# Patient Record
Sex: MALE | Race: WHITE | NOT HISPANIC OR LATINO | Employment: FULL TIME | URBAN - METROPOLITAN AREA
[De-identification: names, ages, dates, MRNs, and addresses within clinical notes are randomized per-mention and may not be internally consistent; named-entity substitution may affect disease eponyms.]

---

## 2017-01-03 RX ORDER — AMLODIPINE BESYLATE 10 MG/1
TABLET ORAL
Qty: 90 TABLET | Refills: 0 | Status: SHIPPED | OUTPATIENT
Start: 2017-01-03 | End: 2017-04-01 | Stop reason: SDUPTHER

## 2017-01-03 RX ORDER — CITALOPRAM 20 MG/1
TABLET, FILM COATED ORAL
Qty: 90 TABLET | Refills: 0 | Status: SHIPPED | OUTPATIENT
Start: 2017-01-03 | End: 2017-04-01 | Stop reason: SDUPTHER

## 2017-01-03 NOTE — LETTER
"January 3, 2017    Loydivonne Bateman  [address]  [place] [address]             [facility]  [address] [place] [address]  Phone: [phone]  Fax: [phone] Dear Mr. Bateman:    You have recently requested a refill from Dr. Nigel Naranjo.  He has supplied you with a 30 day of this medication.   It has been over a year since your last visit.  This is a reminder to schedule your annual with Dr. Nigel Naranjo.  Please call the [facility] scheduling department at [phone] before your next refill.  No further refills will be given.    If you have any questions or concerns, please don't hesitate to call.          Sincerely,        CHAITANYA Lowry Dr. (Casey)     "

## 2017-04-01 RX ORDER — CITALOPRAM 20 MG/1
TABLET, FILM COATED ORAL
Qty: 90 TABLET | Refills: 0 | Status: SHIPPED | OUTPATIENT
Start: 2017-04-01 | End: 2017-06-28 | Stop reason: SDUPTHER

## 2017-04-01 RX ORDER — AMLODIPINE BESYLATE 10 MG/1
TABLET ORAL
Qty: 90 TABLET | Refills: 0 | Status: SHIPPED | OUTPATIENT
Start: 2017-04-01 | End: 2017-06-28 | Stop reason: SDUPTHER

## 2017-05-25 RX ORDER — LOSARTAN POTASSIUM AND HYDROCHLOROTHIAZIDE 25; 100 MG/1; MG/1
TABLET ORAL
Qty: 90 TABLET | Refills: 0 | Status: SHIPPED | OUTPATIENT
Start: 2017-05-25 | End: 2017-06-28 | Stop reason: SDUPTHER

## 2017-06-13 RX ORDER — FINASTERIDE 1 MG/1
1 TABLET, FILM COATED ORAL DAILY
Qty: 30 TABLET | Refills: 0 | Status: SHIPPED | OUTPATIENT
Start: 2017-06-13 | End: 2017-06-28 | Stop reason: SDUPTHER

## 2017-06-16 ENCOUNTER — PATIENT OUTREACH (OUTPATIENT)
Dept: ADMINISTRATIVE | Facility: HOSPITAL | Age: 47
End: 2017-06-16

## 2017-06-16 ENCOUNTER — PATIENT MESSAGE (OUTPATIENT)
Dept: ADMINISTRATIVE | Facility: HOSPITAL | Age: 47
End: 2017-06-16

## 2017-06-16 DIAGNOSIS — E78.5 HYPERLIPIDEMIA, UNSPECIFIED HYPERLIPIDEMIA TYPE: ICD-10-CM

## 2017-06-16 DIAGNOSIS — I10 ESSENTIAL HYPERTENSION: Primary | ICD-10-CM

## 2017-06-18 NOTE — LETTER
"June 19, 2017    Loydivonne Bateman  [address]  [place] [address]             [facility]  [address] [place] [address]  Phone: [phone]  Fax: [phone] Dear Mr. Bateman:    You have recently requested a refill from Dr. Nigel Naranjo.  He has supplied you with a 30 day of this medication.   It has been over a year since your last visit.  This is a reminder to schedule your annual with Dr. Nigel Naranjo.  Please call the [facility] scheduling department at [phone] before your next refill.  No further refills will be given.    If you have any questions or concerns, please don't hesitate to call.          Sincerely,        CHAITANYA Lowry Dr. ([place])     "

## 2017-06-19 RX ORDER — ATORVASTATIN CALCIUM 20 MG/1
TABLET, FILM COATED ORAL
Qty: 90 TABLET | Refills: 0 | Status: SHIPPED | OUTPATIENT
Start: 2017-06-19 | End: 2017-06-28 | Stop reason: SDUPTHER

## 2017-06-23 ENCOUNTER — LAB VISIT (OUTPATIENT)
Dept: LAB | Facility: HOSPITAL | Age: 47
End: 2017-06-23
Attending: INTERNAL MEDICINE
Payer: OTHER GOVERNMENT

## 2017-06-23 DIAGNOSIS — E78.5 HYPERLIPIDEMIA, UNSPECIFIED HYPERLIPIDEMIA TYPE: ICD-10-CM

## 2017-06-23 DIAGNOSIS — I10 ESSENTIAL HYPERTENSION: ICD-10-CM

## 2017-06-23 LAB
ALBUMIN SERPL BCP-MCNC: 4.1 G/DL
ALP SERPL-CCNC: 80 U/L
ALT SERPL W/O P-5'-P-CCNC: 28 U/L
ANION GAP SERPL CALC-SCNC: 11 MMOL/L
AST SERPL-CCNC: 19 U/L
BASOPHILS # BLD AUTO: 0.02 K/UL
BASOPHILS NFR BLD: 0.4 %
BILIRUB SERPL-MCNC: 0.7 MG/DL
BUN SERPL-MCNC: 17 MG/DL
CALCIUM SERPL-MCNC: 9.7 MG/DL
CHLORIDE SERPL-SCNC: 100 MMOL/L
CHOLEST/HDLC SERPL: 4.2 {RATIO}
CO2 SERPL-SCNC: 30 MMOL/L
CREAT SERPL-MCNC: 1 MG/DL
DIFFERENTIAL METHOD: NORMAL
EOSINOPHIL # BLD AUTO: 0.1 K/UL
EOSINOPHIL NFR BLD: 1.7 %
ERYTHROCYTE [DISTWIDTH] IN BLOOD BY AUTOMATED COUNT: 13.3 %
EST. GFR  (AFRICAN AMERICAN): >60 ML/MIN/1.73 M^2
EST. GFR  (NON AFRICAN AMERICAN): >60 ML/MIN/1.73 M^2
GLUCOSE SERPL-MCNC: 63 MG/DL
HCT VFR BLD AUTO: 42.9 %
HDL/CHOLESTEROL RATIO: 24 %
HDLC SERPL-MCNC: 175 MG/DL
HDLC SERPL-MCNC: 42 MG/DL
HGB BLD-MCNC: 14.4 G/DL
LDLC SERPL CALC-MCNC: 87.4 MG/DL
LYMPHOCYTES # BLD AUTO: 1.5 K/UL
LYMPHOCYTES NFR BLD: 31.7 %
MCH RBC QN AUTO: 29.6 PG
MCHC RBC AUTO-ENTMCNC: 33.6 %
MCV RBC AUTO: 88 FL
MONOCYTES # BLD AUTO: 0.5 K/UL
MONOCYTES NFR BLD: 11.8 %
NEUTROPHILS # BLD AUTO: 2.5 K/UL
NEUTROPHILS NFR BLD: 54 %
NONHDLC SERPL-MCNC: 133 MG/DL
PLATELET # BLD AUTO: 233 K/UL
PMV BLD AUTO: 10 FL
POTASSIUM SERPL-SCNC: 4.8 MMOL/L
PROT SERPL-MCNC: 7.3 G/DL
RBC # BLD AUTO: 4.86 M/UL
SODIUM SERPL-SCNC: 141 MMOL/L
TRIGL SERPL-MCNC: 228 MG/DL
WBC # BLD AUTO: 4.58 K/UL

## 2017-06-23 PROCEDURE — 80061 LIPID PANEL: CPT

## 2017-06-23 PROCEDURE — 80053 COMPREHEN METABOLIC PANEL: CPT

## 2017-06-23 PROCEDURE — 85025 COMPLETE CBC W/AUTO DIFF WBC: CPT

## 2017-06-23 PROCEDURE — 36415 COLL VENOUS BLD VENIPUNCTURE: CPT | Mod: PO

## 2017-06-26 NOTE — PROGRESS NOTES
Here are the results of your recent labs.  We will review them in detail at your follow up visit.    Sincerely,    Nigel Naranjo M.D.        If you would like to review your experience with Dr. Naranjo or [facility], please follow the link below:    [email or web address]

## 2017-06-28 ENCOUNTER — OFFICE VISIT (OUTPATIENT)
Dept: INTERNAL MEDICINE | Facility: CLINIC | Age: 47
End: 2017-06-28
Payer: OTHER GOVERNMENT

## 2017-06-28 VITALS
BODY MASS INDEX: 34.04 KG/M2 | HEIGHT: 72 IN | WEIGHT: 251.31 LBS | SYSTOLIC BLOOD PRESSURE: 122 MMHG | HEART RATE: 76 BPM | TEMPERATURE: 98 F | DIASTOLIC BLOOD PRESSURE: 80 MMHG

## 2017-06-28 DIAGNOSIS — Z00.00 ROUTINE PHYSICAL EXAMINATION: Primary | ICD-10-CM

## 2017-06-28 PROCEDURE — 99213 OFFICE O/P EST LOW 20 MIN: CPT | Mod: PBBFAC,PO | Performed by: FAMILY MEDICINE

## 2017-06-28 PROCEDURE — 99396 PREV VISIT EST AGE 40-64: CPT | Mod: S$PBB,,, | Performed by: FAMILY MEDICINE

## 2017-06-28 PROCEDURE — 99999 PR PBB SHADOW E&M-EST. PATIENT-LVL III: CPT | Mod: PBBFAC,,, | Performed by: FAMILY MEDICINE

## 2017-06-28 RX ORDER — AMLODIPINE BESYLATE 10 MG/1
10 TABLET ORAL DAILY
Qty: 90 TABLET | Refills: 3 | Status: SHIPPED | OUTPATIENT
Start: 2017-06-28 | End: 2018-09-06 | Stop reason: SDUPTHER

## 2017-06-28 RX ORDER — CITALOPRAM 20 MG/1
20 TABLET, FILM COATED ORAL DAILY
Qty: 90 TABLET | Refills: 3 | Status: SHIPPED | OUTPATIENT
Start: 2017-06-28 | End: 2017-08-01 | Stop reason: SDUPTHER

## 2017-06-28 RX ORDER — ATORVASTATIN CALCIUM 20 MG/1
20 TABLET, FILM COATED ORAL DAILY
Qty: 90 TABLET | Refills: 3 | Status: SHIPPED | OUTPATIENT
Start: 2017-06-28 | End: 2018-08-14 | Stop reason: SDUPTHER

## 2017-06-28 RX ORDER — OMEPRAZOLE 20 MG/1
20 CAPSULE, DELAYED RELEASE ORAL DAILY
Qty: 90 CAPSULE | Refills: 3 | Status: SHIPPED | OUTPATIENT
Start: 2017-06-28 | End: 2018-09-06 | Stop reason: SDUPTHER

## 2017-06-28 RX ORDER — LOSARTAN POTASSIUM AND HYDROCHLOROTHIAZIDE 25; 100 MG/1; MG/1
1 TABLET ORAL DAILY
Qty: 90 TABLET | Refills: 3 | Status: SHIPPED | OUTPATIENT
Start: 2017-06-28 | End: 2018-09-06 | Stop reason: SDUPTHER

## 2017-06-28 RX ORDER — FINASTERIDE 1 MG/1
1 TABLET, FILM COATED ORAL DAILY
Qty: 90 TABLET | Refills: 3 | Status: SHIPPED | OUTPATIENT
Start: 2017-06-28 | End: 2017-07-28

## 2017-06-28 RX ORDER — ALPRAZOLAM 0.25 MG/1
0.25 TABLET ORAL 2 TIMES DAILY PRN
Qty: 30 TABLET | Refills: 0 | Status: SHIPPED | OUTPATIENT
Start: 2017-06-28 | End: 2019-01-21 | Stop reason: SDUPTHER

## 2017-06-28 RX ORDER — ALPRAZOLAM 0.25 MG/1
0.25 TABLET ORAL 2 TIMES DAILY PRN
Qty: 90 TABLET | Refills: 0 | Status: CANCELLED | OUTPATIENT
Start: 2017-06-28

## 2017-06-28 NOTE — PROGRESS NOTES
Subjective:      Patient ID: Juan Bateman is a 47 y.o. male.    Chief Complaint: Annual Exam    HPI  48 yo male pt of Dr. Naranjo's here today to renew his scripts and review labs.  He reports that he is lack of energy, is tired a lot.  Is not using CPAP regularly, trouble with mask.  Has tried multiple different options.  Drinking a lot of caffeine/coffee and not much water.  Maybe getting 2 days of exercise daily.  Has some ED issues, but reports libido is normal.    Past Medical History:   Diagnosis Date    Erectile dysfunction     HTN (hypertension)     Hyperlipidemia     SHAW (obstructive sleep apnea)     PONV (postoperative nausea and vomiting)      Family History   Problem Relation Age of Onset    Diabetes Mother     Heart disease Mother     Hodgkin's lymphoma Mother      non hodgkins    Diabetes Father      Past Surgical History:   Procedure Laterality Date    eye surgery      trauma at age 16    KNEE SURGERY      left knee x 3     Social History   Substance Use Topics    Smoking status: Never Smoker    Smokeless tobacco: Never Used    Alcohol use Yes      Comment: social       /80   Pulse 76   Temp 98.1 °F (36.7 °C) (Tympanic)   Ht 6' (1.829 m)   Wt 114 kg (251 lb 5.2 oz)   BMI 34.09 kg/m²     Review of Systems   Constitutional: Positive for fatigue. Negative for activity change and unexpected weight change.   HENT: Negative for hearing loss, rhinorrhea and trouble swallowing.    Eyes: Negative for discharge and visual disturbance.   Respiratory: Negative for chest tightness and wheezing.    Cardiovascular: Negative for chest pain and palpitations.   Gastrointestinal: Negative for blood in stool, constipation, diarrhea and vomiting.   Endocrine: Negative for polydipsia.   Genitourinary: Negative for difficulty urinating, hematuria and urgency.   Musculoskeletal: Positive for arthralgias and joint swelling. Negative for neck pain.   Skin: Negative.    Neurological: Negative  for weakness and headaches.   Psychiatric/Behavioral: Negative for confusion and dysphoric mood.     Objective:     Physical Exam   Constitutional: He is oriented to person, place, and time. He appears well-developed and well-nourished. No distress.   HENT:   Right Ear: External ear normal.   Left Ear: External ear normal.   Nose: Nose normal.   Mouth/Throat: Oropharynx is clear and moist.   Eyes: Pupils are equal, round, and reactive to light.   Neck: Normal range of motion. Neck supple.   Cardiovascular: Normal rate, regular rhythm and normal heart sounds.    Pulmonary/Chest: Effort normal and breath sounds normal. No respiratory distress. He has no wheezes.   Abdominal: Soft. Bowel sounds are normal. He exhibits no distension.   Musculoskeletal: He exhibits no edema.   Neurological: He is alert and oriented to person, place, and time. No cranial nerve deficit.   Skin: Skin is warm and dry. He is not diaphoretic.   Psychiatric: He has a normal mood and affect. His behavior is normal. Judgment and thought content normal.   Nursing note and vitals reviewed.      Lab Results   Component Value Date    WBC 4.58 06/23/2017    HGB 14.4 06/23/2017    HCT 42.9 06/23/2017     06/23/2017    CHOL 175 06/23/2017    TRIG 228 (H) 06/23/2017    HDL 42 06/23/2017    ALT 28 06/23/2017    AST 19 06/23/2017     06/23/2017    K 4.8 06/23/2017     06/23/2017    CREATININE 1.0 06/23/2017    BUN 17 06/23/2017    CO2 30 (H) 06/23/2017    TSH 2.080 10/28/2013    PSA 0.53 08/15/2012       Assessment:     1. Routine physical examination       Plan:   Routine physical examination    Other orders  -     Cancel: Lipid panel; Future; Expected date: 06/16/2017  -     Cancel: CBC W/ AUTO DIFFERENTIAL; Future; Expected date: 06/16/2017  -     Cancel: Comprehensive metabolic panel; Future; Expected date: 06/16/2017  -     Cancel: TSH; Future; Expected date: 06/16/2017  -     Cancel: TSH; Future; Expected date: 06/27/2017  -      Cancel: CBC W/ AUTO DIFFERENTIAL; Future; Expected date: 06/27/2017  -     Cancel: Comprehensive metabolic panel; Future; Expected date: 06/27/2017  -     Cancel: Lipid panel; Future; Expected date: 06/27/2017  -     Cancel: alprazolam (XANAX) 0.25 MG tablet; Take 1 tablet (0.25 mg total) by mouth 2 (two) times daily as needed for Anxiety.  Dispense: 90 tablet; Refill: 0  -     finasteride (PROPECIA) 1 mg tablet; Take 1 tablet (1 mg total) by mouth once daily.  Dispense: 90 tablet; Refill: 3  -     omeprazole (PRILOSEC) 20 MG capsule; Take 1 capsule (20 mg total) by mouth once daily.  Dispense: 90 capsule; Refill: 3  -     losartan-hydrochlorothiazide 100-25 mg (HYZAAR) 100-25 mg per tablet; Take 1 tablet by mouth once daily.  Dispense: 90 tablet; Refill: 3  -     citalopram (CELEXA) 20 MG tablet; Take 1 tablet (20 mg total) by mouth once daily.  Dispense: 90 tablet; Refill: 3  -     atorvastatin (LIPITOR) 20 MG tablet; Take 1 tablet (20 mg total) by mouth once daily.  Dispense: 90 tablet; Refill: 3  -     amlodipine (NORVASC) 10 MG tablet; Take 1 tablet (10 mg total) by mouth once daily.  Dispense: 90 tablet; Refill: 3  -     alprazolam (XANAX) 0.25 MG tablet; Take 1 tablet (0.25 mg total) by mouth 2 (two) times daily as needed for Anxiety.  Dispense: 30 tablet; Refill: 0    BP controlled, cont current meds  Lipids stable, HDL improving and LDL well controlled.  Triglycerides up, but will improve with better eating/exercise and weight loss  Reduce caffeine intake, increase water intake.  Fatigue likely more related to lack of sleep.  More compliance with CPAP.  Focus on healthier lifestyle.  If problems persist regardless of above, let MD know  FU annually and PRN

## 2017-07-31 ENCOUNTER — PATIENT MESSAGE (OUTPATIENT)
Dept: INTERNAL MEDICINE | Facility: CLINIC | Age: 47
End: 2017-07-31

## 2017-07-31 DIAGNOSIS — R55 SYNCOPE, UNSPECIFIED SYNCOPE TYPE: Primary | ICD-10-CM

## 2017-08-01 RX ORDER — CITALOPRAM 40 MG/1
40 TABLET, FILM COATED ORAL DAILY
Qty: 90 TABLET | Refills: 4 | Status: SHIPPED | OUTPATIENT
Start: 2017-08-01 | End: 2018-09-06 | Stop reason: SDUPTHER

## 2017-08-09 DIAGNOSIS — I15.9 SECONDARY HYPERTENSION: Primary | ICD-10-CM

## 2017-08-10 ENCOUNTER — CLINICAL SUPPORT (OUTPATIENT)
Dept: CARDIOLOGY | Facility: CLINIC | Age: 47
End: 2017-08-10
Payer: OTHER GOVERNMENT

## 2017-08-10 ENCOUNTER — OFFICE VISIT (OUTPATIENT)
Dept: CARDIOLOGY | Facility: CLINIC | Age: 47
End: 2017-08-10
Payer: OTHER GOVERNMENT

## 2017-08-10 VITALS
BODY MASS INDEX: 34.1 KG/M2 | DIASTOLIC BLOOD PRESSURE: 104 MMHG | SYSTOLIC BLOOD PRESSURE: 140 MMHG | HEART RATE: 79 BPM | WEIGHT: 251.75 LBS | HEIGHT: 72 IN

## 2017-08-10 DIAGNOSIS — G47.33 OSA (OBSTRUCTIVE SLEEP APNEA): ICD-10-CM

## 2017-08-10 DIAGNOSIS — I10 ESSENTIAL HYPERTENSION: Primary | ICD-10-CM

## 2017-08-10 DIAGNOSIS — G47.33 OSA ON CPAP: Chronic | ICD-10-CM

## 2017-08-10 DIAGNOSIS — E78.00 PURE HYPERCHOLESTEROLEMIA: ICD-10-CM

## 2017-08-10 DIAGNOSIS — Z82.49 FAMILY HISTORY OF ARTERIOSCLEROTIC CARDIOVASCULAR DISEASE: ICD-10-CM

## 2017-08-10 DIAGNOSIS — R55 SYNCOPE, UNSPECIFIED SYNCOPE TYPE: ICD-10-CM

## 2017-08-10 DIAGNOSIS — I15.9 SECONDARY HYPERTENSION: ICD-10-CM

## 2017-08-10 PROCEDURE — 99213 OFFICE O/P EST LOW 20 MIN: CPT | Mod: PBBFAC,PO | Performed by: INTERNAL MEDICINE

## 2017-08-10 PROCEDURE — 99245 OFF/OP CONSLTJ NEW/EST HI 55: CPT | Mod: S$PBB,,, | Performed by: INTERNAL MEDICINE

## 2017-08-10 PROCEDURE — 99999 PR PBB SHADOW E&M-EST. PATIENT-LVL III: CPT | Mod: PBBFAC,,, | Performed by: INTERNAL MEDICINE

## 2017-08-10 PROCEDURE — 93010 ELECTROCARDIOGRAM REPORT: CPT | Mod: ,,, | Performed by: INTERNAL MEDICINE

## 2017-08-10 PROCEDURE — 93005 ELECTROCARDIOGRAM TRACING: CPT | Mod: PBBFAC,PO | Performed by: INTERNAL MEDICINE

## 2017-08-10 RX ORDER — FINASTERIDE 1 MG/1
1 TABLET, FILM COATED ORAL DAILY
COMMUNITY
End: 2018-09-13 | Stop reason: SDUPTHER

## 2017-08-10 NOTE — PROGRESS NOTES
Subjective:   Patient ID:  Juan Bateman is a 47 y.o. male who presents for follow-up of Loss of Consciousness (consult); Hypertension; and Hyperlipidemia  Pt with syncopal episode 2 weeks ago. Pt with previous history of syncope with anxiety. Pt with dizziness before episode. Pt with occasional orthostatic sx.  Pt with LYLES.EKG is nml.  CRF- male, HTN, HLP, family history    Loss of Consciousness   This is a recurrent problem. The current episode started in the past 7 days. The problem occurs rarely. The problem has been gradually improving. He lost consciousness for a period of less than 1 minute. Nothing aggravates the symptoms. Pertinent negatives include no chest pain, dizziness or palpitations. He has tried bed rest for the symptoms. The treatment provided mild relief.   Hypertension   This is a chronic problem. The current episode started more than 1 year ago. The problem is controlled. Pertinent negatives include no chest pain, palpitations or shortness of breath. Past treatments include calcium channel blockers, angiotensin blockers and diuretics. The current treatment provides moderate improvement. Compliance problems include exercise.    Hyperlipidemia   This is a chronic problem. The current episode started more than 1 year ago. The problem is controlled. Pertinent negatives include no chest pain or shortness of breath. Current antihyperlipidemic treatment includes statins. The current treatment provides moderate improvement of lipids. Compliance problems include adherence to exercise.        Review of Systems   Constitution: Negative. Negative for weight gain.   HENT: Negative.    Eyes: Negative.    Cardiovascular: Positive for syncope. Negative for chest pain, leg swelling and palpitations.   Respiratory: Negative.  Negative for shortness of breath.    Endocrine: Negative.    Hematologic/Lymphatic: Negative.    Skin: Negative.    Musculoskeletal: Negative for muscle weakness.    Gastrointestinal: Negative.    Genitourinary: Negative.    Neurological: Negative for dizziness.   Psychiatric/Behavioral: Negative.    Allergic/Immunologic: Negative.      Family History   Problem Relation Age of Onset    Diabetes Mother     Heart disease Mother     Hodgkin's lymphoma Mother      non hodgkins    Diabetes Father      Past Medical History:   Diagnosis Date    Erectile dysfunction     HTN (hypertension)     Hyperlipidemia     SHAW (obstructive sleep apnea)     PONV (postoperative nausea and vomiting)      Current Outpatient Prescriptions on File Prior to Visit   Medication Sig Dispense Refill    alprazolam (XANAX) 0.25 MG tablet Take 1 tablet (0.25 mg total) by mouth 2 (two) times daily as needed for Anxiety. 30 tablet 0    amlodipine (NORVASC) 10 MG tablet Take 1 tablet (10 mg total) by mouth once daily. 90 tablet 3    aspirin 81 mg Tab Take 1 tablet by mouth Daily.      atorvastatin (LIPITOR) 20 MG tablet Take 1 tablet (20 mg total) by mouth once daily. 90 tablet 3    citalopram (CELEXA) 40 MG tablet Take 1 tablet (40 mg total) by mouth once daily. 90 tablet 4    losartan-hydrochlorothiazide 100-25 mg (HYZAAR) 100-25 mg per tablet Take 1 tablet by mouth once daily. 90 tablet 3    omeprazole (PRILOSEC) 20 MG capsule Take 1 capsule (20 mg total) by mouth once daily. 90 capsule 3    VIAGRA 50 mg tablet TAKE 1 TO 2 TABLETS 20 MINUTES PRIOR TO SEXUAL ACTIVITY 30 tablet 9     No current facility-administered medications on file prior to visit.      Review of patient's allergies indicates:   Allergen Reactions    No known drug allergies        Objective:     Physical Exam   Constitutional: He is oriented to person, place, and time. He appears well-developed and well-nourished.   HENT:   Head: Normocephalic and atraumatic.   Eyes: Conjunctivae are normal. Pupils are equal, round, and reactive to light.   Neck: Normal range of motion. Neck supple.   Cardiovascular: Normal rate, regular  rhythm, normal heart sounds and intact distal pulses.    Pulmonary/Chest: Effort normal and breath sounds normal.   Abdominal: Soft. Bowel sounds are normal.   Neurological: He is alert and oriented to person, place, and time.   Skin: Skin is warm and dry.   Psychiatric: He has a normal mood and affect.   Nursing note and vitals reviewed.      Assessment:     1. Essential hypertension    2. Pure hypercholesterolemia    3. SHAW (obstructive sleep apnea)    4. SHAW on CPAP    5. Family history of arteriosclerotic cardiovascular disease    6. Syncope, unspecified syncope type        Plan:     Essential hypertension    Pure hypercholesterolemia    SHAW (obstructive sleep apnea)    SHAW on CPAP    Family history of arteriosclerotic cardiovascular disease    Syncope, unspecified syncope type

## 2017-08-10 NOTE — LETTER
August 10, 2017      Nigel Naranjo MD  [address]  [address]  [facility] [address]           [place] [facility]  [address] [address]  Phone: [phone]  Fax: [phone]          Patient: Juan Bateman   MR Number: [id number]   YOB: 1970   Date of Visit: 8/10/2017       Dear Dr. Nigel Naranjo:    Thank you for referring Juan Bateman to me for evaluation. Attached you will find relevant portions of my assessment and plan of care.    If you have questions, please do not hesitate to call me. I look forward to following Juan Bateman along with you.    Sincerely,    Elias Cardenas MD    Enclosure  CC:  No Recipients    If you would like to receive this communication electronically, please contact [email or web address] or [phone] to request more information on [employer] access.    For providers and/or their staff who would like to refer a patient to [facility], please contact us through our one-stop-shop provider referral line, [facility], at [phone].    If you feel you have received this communication in error or would no longer like to receive these types of communications, please e-mail [email or web address]

## 2017-08-17 ENCOUNTER — CLINICAL SUPPORT (OUTPATIENT)
Dept: CARDIOLOGY | Facility: CLINIC | Age: 47
End: 2017-08-17
Payer: OTHER GOVERNMENT

## 2017-08-17 DIAGNOSIS — Z82.49 FAMILY HISTORY OF ARTERIOSCLEROTIC CARDIOVASCULAR DISEASE: ICD-10-CM

## 2017-08-17 DIAGNOSIS — R55 SYNCOPE, UNSPECIFIED SYNCOPE TYPE: ICD-10-CM

## 2017-08-17 LAB
DIASTOLIC DYSFUNCTION: NO
ESTIMATED PA SYSTOLIC PRESSURE: 18.37
MITRAL VALVE REGURGITATION: NORMAL
RETIRED EF AND QEF - SEE NOTES: 55 (ref 55–65)

## 2017-08-17 PROCEDURE — 93320 DOPPLER ECHO COMPLETE: CPT | Mod: 26,S$PBB,, | Performed by: INTERNAL MEDICINE

## 2017-08-17 PROCEDURE — 93325 DOPPLER ECHO COLOR FLOW MAPG: CPT | Mod: PBBFAC,PO | Performed by: INTERNAL MEDICINE

## 2017-08-17 PROCEDURE — 93351 STRESS TTE COMPLETE: CPT | Mod: 26,S$PBB,, | Performed by: INTERNAL MEDICINE

## 2017-11-16 ENCOUNTER — OFFICE VISIT (OUTPATIENT)
Dept: CARDIOLOGY | Facility: CLINIC | Age: 47
End: 2017-11-16
Payer: OTHER GOVERNMENT

## 2017-11-16 VITALS
BODY MASS INDEX: 34.31 KG/M2 | HEIGHT: 72 IN | SYSTOLIC BLOOD PRESSURE: 120 MMHG | WEIGHT: 253.31 LBS | HEART RATE: 80 BPM | DIASTOLIC BLOOD PRESSURE: 80 MMHG

## 2017-11-16 DIAGNOSIS — E78.00 PURE HYPERCHOLESTEROLEMIA: ICD-10-CM

## 2017-11-16 DIAGNOSIS — G47.33 OSA ON CPAP: Chronic | ICD-10-CM

## 2017-11-16 DIAGNOSIS — I10 ESSENTIAL HYPERTENSION: Primary | ICD-10-CM

## 2017-11-16 PROCEDURE — 99214 OFFICE O/P EST MOD 30 MIN: CPT | Mod: S$PBB,,, | Performed by: INTERNAL MEDICINE

## 2017-11-16 PROCEDURE — 99212 OFFICE O/P EST SF 10 MIN: CPT | Mod: PBBFAC,PO | Performed by: INTERNAL MEDICINE

## 2017-11-16 PROCEDURE — 99999 PR PBB SHADOW E&M-EST. PATIENT-LVL II: CPT | Mod: PBBFAC,,, | Performed by: INTERNAL MEDICINE

## 2017-11-16 NOTE — PROGRESS NOTES
Subjective:   Patient ID:  Juan Bateman is a 47 y.o. 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  Stress echo was (-) for ischemia,hypertensive response    Hypertension   This is a chronic problem. The current episode started more than 1 year ago. The problem has been gradually improving since onset. The problem is controlled. Pertinent negatives include no chest pain, palpitations or shortness of breath. Past treatments include calcium channel blockers and diuretics. The current treatment provides moderate improvement. Compliance problems include exercise.    Hyperlipidemia   The current episode started more than 1 year ago. The problem is controlled. Recent lipid tests were reviewed and are variable. Pertinent negatives include no chest pain or shortness of breath. Current antihyperlipidemic treatment includes statins. The current treatment provides moderate improvement of lipids.       Review of Systems   Constitution: Negative. Negative for weight gain.   HENT: Negative.    Eyes: Negative.    Cardiovascular: Negative.  Negative for chest pain, leg swelling and palpitations.   Respiratory: Negative.  Negative for shortness of breath.    Endocrine: Negative.    Hematologic/Lymphatic: Negative.    Skin: Negative.    Musculoskeletal: Negative for muscle weakness.   Gastrointestinal: Negative.    Genitourinary: Negative.    Neurological: Negative.  Negative for dizziness.   Psychiatric/Behavioral: Negative.    Allergic/Immunologic: Negative.      Family History   Problem Relation Age of Onset    Diabetes Mother     Heart disease Mother     Hodgkin's lymphoma Mother      non hodgkins    Diabetes Father      Past Medical History:   Diagnosis Date    Erectile dysfunction     HTN (hypertension)     Hyperlipidemia     SHAW (obstructive sleep apnea)     PONV  (postoperative nausea and vomiting)      Current Outpatient Prescriptions on File Prior to Visit   Medication Sig Dispense Refill    alprazolam (XANAX) 0.25 MG tablet Take 1 tablet (0.25 mg total) by mouth 2 (two) times daily as needed for Anxiety. 30 tablet 0    amlodipine (NORVASC) 10 MG tablet Take 1 tablet (10 mg total) by mouth once daily. 90 tablet 3    aspirin 81 mg Tab Take 1 tablet by mouth Daily.      atorvastatin (LIPITOR) 20 MG tablet Take 1 tablet (20 mg total) by mouth once daily. 90 tablet 3    citalopram (CELEXA) 40 MG tablet Take 1 tablet (40 mg total) by mouth once daily. 90 tablet 4    finasteride (PROPECIA) 1 mg tablet Take 1 mg by mouth once daily.      losartan-hydrochlorothiazide 100-25 mg (HYZAAR) 100-25 mg per tablet Take 1 tablet by mouth once daily. 90 tablet 3    omeprazole (PRILOSEC) 20 MG capsule Take 1 capsule (20 mg total) by mouth once daily. 90 capsule 3    VIAGRA 50 mg tablet TAKE 1 TO 2 TABLETS 20 MINUTES PRIOR TO SEXUAL ACTIVITY 30 tablet 9     No current facility-administered medications on file prior to visit.      Review of patient's allergies indicates:   Allergen Reactions    No known drug allergies        Objective:     Physical Exam   Constitutional: He is oriented to person, place, and time. He appears well-developed and well-nourished.   HENT:   Head: Normocephalic and atraumatic.   Eyes: Conjunctivae are normal. Pupils are equal, round, and reactive to light.   Neck: Normal range of motion. Neck supple.   Cardiovascular: Normal rate, regular rhythm, normal heart sounds and intact distal pulses.    Pulmonary/Chest: Effort normal and breath sounds normal.   Abdominal: Soft. Bowel sounds are normal.   Neurological: He is alert and oriented to person, place, and time.   Skin: Skin is warm and dry.   Psychiatric: He has a normal mood and affect.   Nursing note and vitals reviewed.      Assessment:     1. Essential hypertension    2. Pure hypercholesterolemia    3.  SHAW on CPAP        Plan:     Essential hypertension    Pure hypercholesterolemia    SHAW on CPAP    continue norvasc,losartan-hctz-htn  Continue statin-hlp  exercsie

## 2018-02-09 RX ORDER — SILDENAFIL CITRATE 50 MG/1
TABLET, FILM COATED ORAL
Qty: 30 TABLET | Refills: 9 | Status: SHIPPED | OUTPATIENT
Start: 2018-02-09 | End: 2019-03-29 | Stop reason: SDUPTHER

## 2018-08-14 RX ORDER — ATORVASTATIN CALCIUM 20 MG/1
TABLET, FILM COATED ORAL
Qty: 90 TABLET | Refills: 0 | Status: SHIPPED | OUTPATIENT
Start: 2018-08-14 | End: 2018-11-12 | Stop reason: SDUPTHER

## 2018-08-14 NOTE — TELEPHONE ENCOUNTER
Patient has not seen Dr. Naranjo in over a year.  Refill is given but the patient needs an appointment for an updated physical.

## 2018-09-06 RX ORDER — AMLODIPINE BESYLATE 10 MG/1
TABLET ORAL
Qty: 90 TABLET | Refills: 0 | Status: SHIPPED | OUTPATIENT
Start: 2018-09-06 | End: 2019-01-17

## 2018-09-06 RX ORDER — OMEPRAZOLE 20 MG/1
CAPSULE, DELAYED RELEASE ORAL
Qty: 90 CAPSULE | Refills: 0 | Status: SHIPPED | OUTPATIENT
Start: 2018-09-06 | End: 2019-03-19 | Stop reason: SDUPTHER

## 2018-09-06 RX ORDER — CITALOPRAM 20 MG/1
TABLET, FILM COATED ORAL
Qty: 90 TABLET | Refills: 0 | Status: SHIPPED | OUTPATIENT
Start: 2018-09-06 | End: 2019-08-26 | Stop reason: SDUPTHER

## 2018-09-06 RX ORDER — LOSARTAN POTASSIUM AND HYDROCHLOROTHIAZIDE 25; 100 MG/1; MG/1
TABLET ORAL
Qty: 90 TABLET | Refills: 0 | Status: SHIPPED | OUTPATIENT
Start: 2018-09-06 | End: 2022-05-12

## 2018-09-13 RX ORDER — FINASTERIDE 1 MG/1
TABLET, FILM COATED ORAL
Qty: 90 TABLET | Refills: 0 | Status: SHIPPED | OUTPATIENT
Start: 2018-09-13 | End: 2019-05-14 | Stop reason: SDUPTHER

## 2018-10-24 ENCOUNTER — PATIENT MESSAGE (OUTPATIENT)
Dept: CARDIOLOGY | Facility: CLINIC | Age: 48
End: 2018-10-24

## 2018-11-12 RX ORDER — ATORVASTATIN CALCIUM 20 MG/1
TABLET, FILM COATED ORAL
Qty: 90 TABLET | Refills: 0 | Status: SHIPPED | OUTPATIENT
Start: 2018-11-12 | End: 2019-02-10 | Stop reason: SDUPTHER

## 2018-11-17 ENCOUNTER — OFFICE VISIT (OUTPATIENT)
Dept: URGENT CARE | Facility: CLINIC | Age: 48
End: 2018-11-17
Payer: OTHER GOVERNMENT

## 2018-11-17 VITALS
OXYGEN SATURATION: 97 % | HEART RATE: 77 BPM | DIASTOLIC BLOOD PRESSURE: 88 MMHG | RESPIRATION RATE: 17 BRPM | TEMPERATURE: 97 F | HEIGHT: 72 IN | SYSTOLIC BLOOD PRESSURE: 140 MMHG | BODY MASS INDEX: 34.78 KG/M2 | WEIGHT: 256.81 LBS

## 2018-11-17 DIAGNOSIS — B00.1 HERPES LABIALIS: Primary | ICD-10-CM

## 2018-11-17 DIAGNOSIS — L30.9 DERMATITIS: ICD-10-CM

## 2018-11-17 PROCEDURE — 99213 OFFICE O/P EST LOW 20 MIN: CPT | Mod: PBBFAC,PO | Performed by: PHYSICIAN ASSISTANT

## 2018-11-17 PROCEDURE — 99999 PR PBB SHADOW E&M-EST. PATIENT-LVL III: CPT | Mod: PBBFAC,,, | Performed by: PHYSICIAN ASSISTANT

## 2018-11-17 PROCEDURE — 99214 OFFICE O/P EST MOD 30 MIN: CPT | Mod: S$PBB,,, | Performed by: PHYSICIAN ASSISTANT

## 2018-11-17 RX ORDER — CEPHALEXIN 500 MG/1
500 CAPSULE ORAL 4 TIMES DAILY
Qty: 28 CAPSULE | Refills: 0 | Status: SHIPPED | OUTPATIENT
Start: 2018-11-17 | End: 2018-11-24

## 2018-11-17 RX ORDER — ACYCLOVIR 200 MG/1
200 CAPSULE ORAL
Qty: 35 CAPSULE | Refills: 0 | Status: SHIPPED | OUTPATIENT
Start: 2018-11-17 | End: 2019-01-17

## 2018-11-17 NOTE — PATIENT INSTRUCTIONS
If increased leg swelling or calf pain please go to the ER for evaluation for clot.  Follow up with Cardiology as previously scheduled.  Follow up with Dr. Naranjo to discuss leg rash.

## 2018-11-21 DIAGNOSIS — I10 ESSENTIAL HYPERTENSION: Primary | ICD-10-CM

## 2018-11-29 ENCOUNTER — OFFICE VISIT (OUTPATIENT)
Dept: CARDIOLOGY | Facility: CLINIC | Age: 48
End: 2018-11-29
Payer: OTHER GOVERNMENT

## 2018-11-29 ENCOUNTER — CLINICAL SUPPORT (OUTPATIENT)
Dept: CARDIOLOGY | Facility: CLINIC | Age: 48
End: 2018-11-29
Payer: OTHER GOVERNMENT

## 2018-11-29 VITALS
BODY MASS INDEX: 34.13 KG/M2 | HEIGHT: 72 IN | SYSTOLIC BLOOD PRESSURE: 130 MMHG | WEIGHT: 252 LBS | HEART RATE: 90 BPM | DIASTOLIC BLOOD PRESSURE: 90 MMHG

## 2018-11-29 DIAGNOSIS — E78.00 PURE HYPERCHOLESTEROLEMIA: ICD-10-CM

## 2018-11-29 DIAGNOSIS — I10 ESSENTIAL HYPERTENSION: ICD-10-CM

## 2018-11-29 DIAGNOSIS — Z82.49 FAMILY HISTORY OF ARTERIOSCLEROTIC CARDIOVASCULAR DISEASE: ICD-10-CM

## 2018-11-29 DIAGNOSIS — I10 ESSENTIAL HYPERTENSION: Primary | ICD-10-CM

## 2018-11-29 DIAGNOSIS — M79.661 PAIN IN BOTH LOWER LEGS: ICD-10-CM

## 2018-11-29 DIAGNOSIS — G47.33 OSA ON CPAP: Chronic | ICD-10-CM

## 2018-11-29 DIAGNOSIS — M79.662 PAIN IN BOTH LOWER LEGS: ICD-10-CM

## 2018-11-29 PROCEDURE — 93010 ELECTROCARDIOGRAM REPORT: CPT | Mod: ,,, | Performed by: INTERNAL MEDICINE

## 2018-11-29 PROCEDURE — 99999 PR PBB SHADOW E&M-EST. PATIENT-LVL III: CPT | Mod: PBBFAC,,, | Performed by: INTERNAL MEDICINE

## 2018-11-29 PROCEDURE — 93005 ELECTROCARDIOGRAM TRACING: CPT | Mod: PBBFAC,PO | Performed by: INTERNAL MEDICINE

## 2018-11-29 PROCEDURE — 99213 OFFICE O/P EST LOW 20 MIN: CPT | Mod: PBBFAC,PO,25 | Performed by: INTERNAL MEDICINE

## 2018-11-29 PROCEDURE — 99214 OFFICE O/P EST MOD 30 MIN: CPT | Mod: S$PBB,,, | Performed by: INTERNAL MEDICINE

## 2018-11-29 NOTE — PROGRESS NOTES
Subjective:   Patient ID:  Juan Bateman is a 48 y.o. male who presents for follow-up of Hypertension and Hyperlipidemia  Patient denies CP, angina or anginal equivalent.Pt with recurrent cellulitis in BLE. Stress test 2 years ago normal.    Hypertension   This is a chronic problem. The current episode started more than 1 year ago. The problem has been gradually improving since onset. The problem is controlled. Pertinent negatives include no chest pain, palpitations or shortness of breath. Past treatments include angiotensin blockers, diuretics and calcium channel blockers. The current treatment provides moderate improvement. Compliance problems include exercise.    Hyperlipidemia   This is a chronic problem. The current episode started more than 1 year ago. The problem is controlled. Recent lipid tests were reviewed and are variable. Pertinent negatives include no chest pain or shortness of breath. Current antihyperlipidemic treatment includes statins. The current treatment provides moderate improvement of lipids. Compliance problems include adherence to exercise.        Review of Systems   Constitution: Negative. Negative for weight gain.   HENT: Negative.    Eyes: Negative.    Cardiovascular: Negative.  Negative for chest pain, leg swelling and palpitations.   Respiratory: Negative.  Negative for shortness of breath.    Endocrine: Negative.    Hematologic/Lymphatic: Negative.    Skin: Negative.    Musculoskeletal: Negative for muscle weakness.   Gastrointestinal: Negative.    Genitourinary: Negative.    Neurological: Negative.  Negative for dizziness.   Psychiatric/Behavioral: Negative.    Allergic/Immunologic: Negative.      Family History   Problem Relation Age of Onset    Diabetes Mother     Heart disease Mother     Hodgkin's lymphoma Mother         non hodgkins    Diabetes Father      Past Medical History:   Diagnosis Date    Erectile dysfunction     HTN (hypertension)     Hyperlipidemia      SHAW (obstructive sleep apnea)     PONV (postoperative nausea and vomiting)      Current Outpatient Medications on File Prior to Visit   Medication Sig Dispense Refill    acyclovir (ZOVIRAX) 200 MG capsule Take 1 capsule (200 mg total) by mouth 5 (five) times daily. for 7 days 35 capsule 0    alprazolam (XANAX) 0.25 MG tablet Take 1 tablet (0.25 mg total) by mouth 2 (two) times daily as needed for Anxiety. 30 tablet 0    amLODIPine (NORVASC) 10 MG tablet TAKE 1 TABLET DAILY 90 tablet 0    aspirin 81 mg Tab Take 1 tablet by mouth Daily.      atorvastatin (LIPITOR) 20 MG tablet TAKE 1 TABLET DAILY 90 tablet 0    citalopram (CELEXA) 20 MG tablet TAKE 1 TABLET DAILY 90 tablet 0    finasteride (PROPECIA) 1 mg tablet TAKE ONE TABLET BY MOUTH ONCE DAILY 90 tablet 0    losartan-hydrochlorothiazide 100-25 mg (HYZAAR) 100-25 mg per tablet TAKE 1 TABLET DAILY 90 tablet 0    omeprazole (PRILOSEC) 20 MG capsule TAKE 1 CAPSULE DAILY 90 capsule 0    VIAGRA 50 mg tablet TAKE 1 TO 2 TABLETS 20 MINUTES PRIOR TO SEXUAL ACTIVITY 30 tablet 9     No current facility-administered medications on file prior to visit.      Review of patient's allergies indicates:   Allergen Reactions    No known drug allergies        Objective:     Physical Exam   Constitutional: He is oriented to person, place, and time. He appears well-developed and well-nourished.   HENT:   Head: Normocephalic and atraumatic.   Eyes: Conjunctivae are normal. Pupils are equal, round, and reactive to light.   Neck: Normal range of motion. Neck supple.   Cardiovascular: Normal rate, regular rhythm, normal heart sounds and intact distal pulses.   Pulmonary/Chest: Effort normal and breath sounds normal.   Abdominal: Soft. Bowel sounds are normal.   Neurological: He is alert and oriented to person, place, and time.   Skin: Skin is warm and dry.   Psychiatric: He has a normal mood and affect.   Nursing note and vitals reviewed.      Assessment:     1. Essential  hypertension    2. Pure hypercholesterolemia    3. SHAW on CPAP    4. Family history of arteriosclerotic cardiovascular disease        Plan:     Essential hypertension    Pure hypercholesterolemia    SHAW on CPAP    Family history of arteriosclerotic cardiovascular disease      continue norvasc,losartan-hctz-htn  Continue statin-hlp  Vascular studies

## 2018-12-06 ENCOUNTER — OFFICE VISIT (OUTPATIENT)
Dept: INTERNAL MEDICINE | Facility: CLINIC | Age: 48
End: 2018-12-06
Payer: OTHER GOVERNMENT

## 2018-12-06 VITALS
TEMPERATURE: 97 F | SYSTOLIC BLOOD PRESSURE: 130 MMHG | BODY MASS INDEX: 33.83 KG/M2 | HEART RATE: 78 BPM | HEIGHT: 72 IN | WEIGHT: 249.81 LBS | DIASTOLIC BLOOD PRESSURE: 80 MMHG

## 2018-12-06 DIAGNOSIS — K52.9 GASTROENTERITIS: Primary | ICD-10-CM

## 2018-12-06 DIAGNOSIS — I10 ESSENTIAL HYPERTENSION: ICD-10-CM

## 2018-12-06 DIAGNOSIS — R21 RASH: ICD-10-CM

## 2018-12-06 PROCEDURE — 99213 OFFICE O/P EST LOW 20 MIN: CPT | Mod: PBBFAC,PO | Performed by: INTERNAL MEDICINE

## 2018-12-06 PROCEDURE — 99213 OFFICE O/P EST LOW 20 MIN: CPT | Mod: S$PBB,,, | Performed by: INTERNAL MEDICINE

## 2018-12-06 PROCEDURE — 99999 PR PBB SHADOW E&M-EST. PATIENT-LVL III: CPT | Mod: PBBFAC,,, | Performed by: INTERNAL MEDICINE

## 2018-12-06 RX ORDER — ONDANSETRON 8 MG/1
8 TABLET, ORALLY DISINTEGRATING ORAL EVERY 8 HOURS PRN
Qty: 30 TABLET | Refills: 0 | Status: SHIPPED | OUTPATIENT
Start: 2018-12-06 | End: 2019-01-17

## 2018-12-06 NOTE — PROGRESS NOTES
Subjective:       Patient ID: Juan Bateman is a 48 y.o. male.    Chief Complaint: No chief complaint on file.    HPI  Patient is a 48-year-old male coming in today with complaints of 3 day history of loose stools crampy abdominal pain and diarrhea.  Symptoms have been persistent over the last few days.  No fever or chills.  No blood in the stools.  Stool typically are watery.  He has had nausea but no real vomiting.  He has felt a little lightheaded and dizzy at times.  He is trying to drink plenty of fluids.    He reports he has been having episodic rash that is occurring every 2-3 maybe 4 months through the year.  He has pictures of it on his phone which demonstrated a bright red rash on the lower extremities bilaterally.  He states when it occurs the skin becomes swollen and tender to touch.  There are no blisters.  It on the pictures it looks somewhat like a vasculitic rash.  He has been treated for cellulitis when he has had in the past and that seems to have been beneficial but is not really clear how beneficial.  He states most the time the rash was getting better when he got on the antibiotics.    He would like to set up updated physical exam and comprehensive review after the 1st of the year.    Review of Systems    Objective:   /80   Pulse 78   Temp 97.2 °F (36.2 °C) (Tympanic)   Ht 6' (1.829 m)   Wt 113.3 kg (249 lb 12.5 oz)   BMI 33.88 kg/m²      Physical Exam   Constitutional: He appears well-developed and well-nourished.   HENT:   Head: Normocephalic and atraumatic.   Eyes: Pupils are equal, round, and reactive to light.   Neck: Neck supple. No thyromegaly present.   Cardiovascular: Normal rate, regular rhythm and normal heart sounds. Exam reveals no gallop and no friction rub.   No murmur heard.  Pulmonary/Chest: Breath sounds normal. He has no wheezes. He has no rales.   Abdominal: Soft. Bowel sounds are normal. He exhibits no distension. There is no tenderness.   Skin:    Examination lower extremities reveals no rash at this time   Vitals reviewed.          Assessment:       1. Gastroenteritis    2. Rash    3. Essential hypertension        Plan:   No problem-specific Assessment & Plan notes found for this encounter.    Diagnoses and all orders for this visit:    Gastroenteritis  Comments:  Push fluids (water, powerade, gatorade), bland diet, zofran for nausea.  Orders:  -     ondansetron (ZOFRAN-ODT) 8 MG TbDL; Take 1 tablet (8 mg total) by mouth every 8 (eight) hours as needed. For nausea/vomiting    Rash  Comments:  Possible vasculitis vs recurrent cellulitis.  No rash today.  Consult dermatology.  Orders:  -     Ambulatory referral to Dermatology    Essential hypertension  -     CBC auto differential; Future  -     Comprehensive metabolic panel; Future  -     Lipid panel; Future  -     PSA, Screening; Future  -     TSH; Future          Follow-up in about 6 weeks (around 1/15/2019).

## 2018-12-10 ENCOUNTER — CLINICAL SUPPORT (OUTPATIENT)
Dept: CARDIOLOGY | Facility: CLINIC | Age: 48
End: 2018-12-10
Attending: INTERNAL MEDICINE
Payer: OTHER GOVERNMENT

## 2018-12-10 DIAGNOSIS — M79.662 PAIN IN BOTH LOWER LEGS: ICD-10-CM

## 2018-12-10 DIAGNOSIS — M79.661 PAIN IN BOTH LOWER LEGS: ICD-10-CM

## 2018-12-10 PROCEDURE — 93922 UPR/L XTREMITY ART 2 LEVELS: CPT | Mod: PBBFAC | Performed by: INTERNAL MEDICINE

## 2018-12-12 LAB — VASCULAR ANKLE BRACHIAL INDEX (ABI) LEFT: 1.2 (ref 0.9–1.2)

## 2018-12-13 ENCOUNTER — TELEPHONE (OUTPATIENT)
Dept: CARDIOLOGY | Facility: CLINIC | Age: 48
End: 2018-12-13

## 2018-12-13 NOTE — TELEPHONE ENCOUNTER
----- Message from Elias Cardenas MD sent at 12/13/2018  4:29 AM CST -----  Please tell pt ZHOU is nml

## 2018-12-13 NOTE — TELEPHONE ENCOUNTER
12/13 Pt notified and to follow up as scheduled. Cm   12/13 Called and left a v/m that I was calling with good test results, will call back later today. Cm  ----- Message from Elias Cardenas MD sent at 12/13/2018  4:29 AM CST -----  Please tell pt ZHOU is nml

## 2018-12-20 ENCOUNTER — INITIAL CONSULT (OUTPATIENT)
Dept: DERMATOLOGY | Facility: CLINIC | Age: 48
End: 2018-12-20
Payer: OTHER GOVERNMENT

## 2018-12-20 DIAGNOSIS — L30.9 DERMATITIS: Primary | ICD-10-CM

## 2018-12-20 PROCEDURE — 99212 OFFICE O/P EST SF 10 MIN: CPT | Mod: PBBFAC,PO | Performed by: STUDENT IN AN ORGANIZED HEALTH CARE EDUCATION/TRAINING PROGRAM

## 2018-12-20 PROCEDURE — 99999 PR PBB SHADOW E&M-EST. PATIENT-LVL II: CPT | Mod: PBBFAC,,, | Performed by: STUDENT IN AN ORGANIZED HEALTH CARE EDUCATION/TRAINING PROGRAM

## 2018-12-20 PROCEDURE — 99202 OFFICE O/P NEW SF 15 MIN: CPT | Mod: S$PBB,,, | Performed by: STUDENT IN AN ORGANIZED HEALTH CARE EDUCATION/TRAINING PROGRAM

## 2018-12-20 NOTE — LETTER
December 20, 2018      Nigel Naranjo MD  [address] [facility]  [address]  [place] [address]           [facility]  [address] [facility]  [place] [address]  Phone: [phone]  Fax: [phone]          Patient: Juan Bateman   MR Number: [id number]   YOB: 1970   Date of Visit: 12/20/2018       Dear Dr. Nigel Naranjo:    Thank you for referring Juan Bateman to me for evaluation. Attached you will find relevant portions of my assessment and plan of care.    If you have questions, please do not hesitate to call me. I look forward to following Juan Bateman along with you.    Sincerely,    Simon Palmer MD    Enclosure  CC:  No Recipients    If you would like to receive this communication electronically, please contact [email or web address] or [phone] to request more information on [employer] Link access.    For providers and/or their staff who would like to refer a patient to [facility], please contact us through our one-stop-shop provider referral line, [facility], at [phone].    If you feel you have received this communication in error or would no longer like to receive these types of communications, please e-mail [email or web address]

## 2018-12-20 NOTE — PROGRESS NOTES
Subjective:       Patient ID:  Juan Bateman is a 48 y.o. male who presents for   Chief Complaint   Patient presents with    Rash     c/o rash to both legs x 2 years tx otc lotion      History of Present Illness: The patient presents with chief complaint of rash.  Location: both legs   Duration: 2 years   Signs/Symptoms: itch at times, red , swells     Prior treatments: otc lotion     He reports that for the past year, he has been having red, painful bumps on the lower legs that last for several days and then resolve. He reports that it starts at small bumps that then coalesce into plaques. He was diagnosed with cellulitis and given antibiotics. However, it continues to recur. It has occurred approx 4 times over the past year. Denies any illnesses or recent medications while this rash has been occurring. He denies any rash today for exam.         Review of Systems   Skin: Negative for itching and rash.        Objective:    Physical Exam   Constitutional: He appears well-developed and well-nourished. No distress.   Neurological: He is alert and oriented to person, place, and time. He is not disoriented.   Psychiatric: He has a normal mood and affect.   Skin:   Areas Examined (abnormalities noted in diagram):   Head / Face Inspection Performed  Back Inspection Performed  RLE Inspected  LLE Inspection Performed              Diagram Legend     Erythematous scaling macule/papule c/w actinic keratosis       Vascular papule c/w angioma      Pigmented verrucoid papule/plaque c/w seborrheic keratosis      Yellow umbilicated papule c/w sebaceous hyperplasia      Irregularly shaped tan macule c/w lentigo     1-2 mm smooth white papules consistent with Milia      Movable subcutaneous cyst with punctum c/w epidermal inclusion cyst      Subcutaneous movable cyst c/w pilar cyst      Firm pink to brown papule c/w dermatofibroma      Pedunculated fleshy papule(s) c/w skin tag(s)      Evenly pigmented macule c/w  junctional nevus     Mildly variegated pigmented, slightly irregular-bordered macule c/w mildly atypical nevus      Flesh colored to evenly pigmented papule c/w intradermal nevus       Pink pearly papule/plaque c/w basal cell carcinoma      Erythematous hyperkeratotic cursted plaque c/w SCC      Surgical scar with no sign of skin cancer recurrence      Open and closed comedones      Inflammatory papules and pustules      Verrucoid papule consistent consistent with wart     Erythematous eczematous patches and plaques     Dystrophic onycholytic nail with subungual debris c/w onychomycosis     Umbilicated papule    Erythematous-base heme-crusted tan verrucoid plaque consistent with inflamed seborrheic keratosis     Erythematous Silvery Scaling Plaque c/w Psoriasis     See annotation      Assessment / Plan:        Dermatitis - no rash present on exam today. Pictures taken by patient appear to be consistent with vasculitis. However, no rash to evaluated today. Will have patient call and schedule urgent appointment if rash were to return for evaluation and biopsy.     Subcutaneous nodule - EIC  - will schedule for surgical removal.          Follow-up if symptoms worsen or fail to improve.

## 2018-12-21 ENCOUNTER — PATIENT MESSAGE (OUTPATIENT)
Dept: DERMATOLOGY | Facility: CLINIC | Age: 48
End: 2018-12-21

## 2019-01-07 ENCOUNTER — LAB VISIT (OUTPATIENT)
Dept: LAB | Facility: HOSPITAL | Age: 49
End: 2019-01-07
Attending: INTERNAL MEDICINE
Payer: OTHER GOVERNMENT

## 2019-01-07 DIAGNOSIS — I10 ESSENTIAL HYPERTENSION: ICD-10-CM

## 2019-01-07 LAB
ALBUMIN SERPL BCP-MCNC: 4.1 G/DL
ALP SERPL-CCNC: 72 U/L
ALT SERPL W/O P-5'-P-CCNC: 28 U/L
ANION GAP SERPL CALC-SCNC: 8 MMOL/L
AST SERPL-CCNC: 25 U/L
BASOPHILS # BLD AUTO: 0.02 K/UL
BASOPHILS NFR BLD: 0.5 %
BILIRUB SERPL-MCNC: 0.5 MG/DL
BUN SERPL-MCNC: 16 MG/DL
CALCIUM SERPL-MCNC: 9.7 MG/DL
CHLORIDE SERPL-SCNC: 104 MMOL/L
CHOLEST SERPL-MCNC: 191 MG/DL
CHOLEST/HDLC SERPL: 4.4 {RATIO}
CO2 SERPL-SCNC: 28 MMOL/L
COMPLEXED PSA SERPL-MCNC: 0.18 NG/ML
CREAT SERPL-MCNC: 0.8 MG/DL
DIFFERENTIAL METHOD: ABNORMAL
EOSINOPHIL # BLD AUTO: 0.1 K/UL
EOSINOPHIL NFR BLD: 1.3 %
ERYTHROCYTE [DISTWIDTH] IN BLOOD BY AUTOMATED COUNT: 12.8 %
EST. GFR  (AFRICAN AMERICAN): >60 ML/MIN/1.73 M^2
EST. GFR  (NON AFRICAN AMERICAN): >60 ML/MIN/1.73 M^2
GLUCOSE SERPL-MCNC: 82 MG/DL
HCT VFR BLD AUTO: 42.5 %
HDLC SERPL-MCNC: 43 MG/DL
HDLC SERPL: 22.5 %
HGB BLD-MCNC: 13.5 G/DL
IMM GRANULOCYTES # BLD AUTO: 0.01 K/UL
IMM GRANULOCYTES NFR BLD AUTO: 0.3 %
LDLC SERPL CALC-MCNC: 108.4 MG/DL
LYMPHOCYTES # BLD AUTO: 1.3 K/UL
LYMPHOCYTES NFR BLD: 34.8 %
MCH RBC QN AUTO: 28.4 PG
MCHC RBC AUTO-ENTMCNC: 31.8 G/DL
MCV RBC AUTO: 89 FL
MONOCYTES # BLD AUTO: 0.4 K/UL
MONOCYTES NFR BLD: 10.1 %
NEUTROPHILS # BLD AUTO: 2 K/UL
NEUTROPHILS NFR BLD: 53 %
NONHDLC SERPL-MCNC: 148 MG/DL
NRBC BLD-RTO: 0 /100 WBC
PLATELET # BLD AUTO: 205 K/UL
PMV BLD AUTO: 10.9 FL
POTASSIUM SERPL-SCNC: 5 MMOL/L
PROT SERPL-MCNC: 7.2 G/DL
RBC # BLD AUTO: 4.76 M/UL
SODIUM SERPL-SCNC: 140 MMOL/L
TRIGL SERPL-MCNC: 198 MG/DL
TSH SERPL DL<=0.005 MIU/L-ACNC: 2.89 UIU/ML
WBC # BLD AUTO: 3.76 K/UL

## 2019-01-07 PROCEDURE — 84153 ASSAY OF PSA TOTAL: CPT

## 2019-01-07 PROCEDURE — 80061 LIPID PANEL: CPT

## 2019-01-07 PROCEDURE — 36415 COLL VENOUS BLD VENIPUNCTURE: CPT | Mod: PO

## 2019-01-07 PROCEDURE — 85025 COMPLETE CBC W/AUTO DIFF WBC: CPT

## 2019-01-07 PROCEDURE — 80053 COMPREHEN METABOLIC PANEL: CPT

## 2019-01-07 PROCEDURE — 84443 ASSAY THYROID STIM HORMONE: CPT

## 2019-01-08 NOTE — PROGRESS NOTES
Here are the results of your recent labs.  We will review them in detail at your follow up visit.    Sincerely,    Nigel Naranjo M.D.        If you would like to review your experience with Dr. Naranjo or [facility], please follow the link below:    [email or web address]

## 2019-01-14 ENCOUNTER — PATIENT MESSAGE (OUTPATIENT)
Dept: INTERNAL MEDICINE | Facility: CLINIC | Age: 49
End: 2019-01-14

## 2019-01-17 ENCOUNTER — OFFICE VISIT (OUTPATIENT)
Dept: INTERNAL MEDICINE | Facility: CLINIC | Age: 49
End: 2019-01-17
Payer: OTHER GOVERNMENT

## 2019-01-17 VITALS
WEIGHT: 251.31 LBS | SYSTOLIC BLOOD PRESSURE: 130 MMHG | TEMPERATURE: 97 F | DIASTOLIC BLOOD PRESSURE: 88 MMHG | HEIGHT: 72 IN | BODY MASS INDEX: 34.04 KG/M2 | HEART RATE: 78 BPM

## 2019-01-17 DIAGNOSIS — M79.10 MYALGIA: ICD-10-CM

## 2019-01-17 DIAGNOSIS — G47.33 OSA ON CPAP: Chronic | ICD-10-CM

## 2019-01-17 DIAGNOSIS — Z12.11 COLON CANCER SCREENING: ICD-10-CM

## 2019-01-17 DIAGNOSIS — E78.2 MIXED HYPERLIPIDEMIA: ICD-10-CM

## 2019-01-17 DIAGNOSIS — Z00.00 ROUTINE GENERAL MEDICAL EXAMINATION AT HEALTH CARE FACILITY: Primary | ICD-10-CM

## 2019-01-17 DIAGNOSIS — M77.11 LATERAL EPICONDYLITIS OF RIGHT ELBOW: ICD-10-CM

## 2019-01-17 DIAGNOSIS — I10 ESSENTIAL HYPERTENSION: ICD-10-CM

## 2019-01-17 PROCEDURE — 99999 PR PBB SHADOW E&M-EST. PATIENT-LVL III: ICD-10-PCS | Mod: PBBFAC,,, | Performed by: INTERNAL MEDICINE

## 2019-01-17 PROCEDURE — 99396 PREV VISIT EST AGE 40-64: CPT | Mod: S$PBB,,, | Performed by: INTERNAL MEDICINE

## 2019-01-17 PROCEDURE — 99213 OFFICE O/P EST LOW 20 MIN: CPT | Mod: PBBFAC,PO | Performed by: INTERNAL MEDICINE

## 2019-01-17 PROCEDURE — 99396 PR PREVENTIVE VISIT,EST,40-64: ICD-10-PCS | Mod: S$PBB,,, | Performed by: INTERNAL MEDICINE

## 2019-01-17 PROCEDURE — 99999 PR PBB SHADOW E&M-EST. PATIENT-LVL III: CPT | Mod: PBBFAC,,, | Performed by: INTERNAL MEDICINE

## 2019-01-17 RX ORDER — AMLODIPINE BESYLATE 5 MG/1
5 TABLET ORAL DAILY
Qty: 30 TABLET | Refills: 11 | Status: SHIPPED | OUTPATIENT
Start: 2019-01-17 | End: 2019-09-03 | Stop reason: SDUPTHER

## 2019-01-17 RX ORDER — MELOXICAM 15 MG/1
15 TABLET ORAL DAILY
Qty: 30 TABLET | Refills: 11 | Status: SHIPPED | OUTPATIENT
Start: 2019-01-17 | End: 2019-05-06

## 2019-01-17 RX ORDER — METOPROLOL SUCCINATE 50 MG/1
50 TABLET, EXTENDED RELEASE ORAL DAILY
Qty: 30 TABLET | Refills: 11 | Status: SHIPPED | OUTPATIENT
Start: 2019-01-17 | End: 2019-09-03 | Stop reason: SDUPTHER

## 2019-01-17 NOTE — PROGRESS NOTES
Subjective:       Patient ID: Juan Bateman is a 48 y.o. male.    Chief Complaint: Annual Exam    HPI  Patient is a 48-year-old male presenting today for updated physical exam review of chronic health issues.  Patient has history of hypertension, hyperlipidemia, sleep apnea.  He has been noticing some issues with some right elbow pain and some occasional myalgias and arthralgias.    He relates no issues with chronic conditions although he is not consistent with his use of his CPAP.  He states he really has trouble tolerating the mask and is not sure what he can do about that.  He has not had routine follow-up with the Sleep Clinic.  We will work to try to get him updated follow-up in Sleep to see what other options LEE have around mask stroke equipment.    He is having some discomfort in his right elbow.  He is on the lateral aspect of the elbow.  It is painful when he picks up his coffee cup for a gal of milk it is painful when he shakes hands.  He does not play tennis.  He is not aware of what activity may be aggravating it.  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    He also was wondering if he might be having some problems with his Lipitor.  He notes just generalized achiness at times.  It is not really specific is sort of more generalized.  He has been on Lipitor for a while.  He has not had problems with before.    He is due for colon cancer screening as he has turned 48.    Review of Systems   Constitutional: Negative for activity change, fever and unexpected weight change.   HENT: Negative for hearing loss, postnasal drip, rhinorrhea and trouble swallowing.    Eyes: Negative for pain, discharge and visual disturbance.   Respiratory: Negative for cough, chest tightness, shortness of breath and wheezing.    Cardiovascular: Positive for palpitations (Associated with situational anxiety). Negative for chest pain.   Gastrointestinal: Negative for blood in stool, constipation, diarrhea, nausea and  vomiting.   Endocrine: Negative for polydipsia and polyuria.   Genitourinary: Negative for difficulty urinating, dysuria, hematuria and urgency.   Musculoskeletal: Positive for arthralgias. Negative for back pain, joint swelling, myalgias, neck pain and neck stiffness.   Skin: Negative for pallor and rash.   Neurological: Negative for seizures, syncope, weakness and headaches.   Hematological: Negative for adenopathy.   Psychiatric/Behavioral: Negative for confusion and dysphoric mood. The patient is not nervous/anxious.        Objective:   /88   Pulse 78   Temp 97.2 °F (36.2 °C) (Tympanic)   Ht 6' (1.829 m)   Wt 114 kg (251 lb 5.2 oz)   BMI 34.09 kg/m²      Physical Exam   Constitutional: He is oriented to person, place, and time. He appears well-developed and well-nourished. No distress.   HENT:   Head: Normocephalic and atraumatic.   Mouth/Throat: Oropharynx is clear and moist.   Eyes: EOM are normal. Pupils are equal, round, and reactive to light.   Neck: Normal range of motion. Neck supple. No JVD present. No thyromegaly present.   Cardiovascular: Normal rate, regular rhythm, normal heart sounds and intact distal pulses. Exam reveals no gallop and no friction rub.   No murmur heard.  Pulmonary/Chest: Effort normal and breath sounds normal. He has no wheezes. He has no rales.   Abdominal: Soft. Bowel sounds are normal. He exhibits no distension. There is no tenderness. There is no rebound and no guarding.   Musculoskeletal: Normal range of motion. He exhibits no edema.   Tenderness at the lateral epicondyle right arm   Lymphadenopathy:     He has no cervical adenopathy.   Neurological: He is alert and oriented to person, place, and time. He has normal reflexes. No cranial nerve deficit.   Skin: Skin is warm and dry. No rash noted.   Psychiatric: He has a normal mood and affect. Judgment normal.   Vitals reviewed.      Lab Visit on 01/07/2019   Component Date Value    WBC 01/07/2019 3.76*    RBC  01/07/2019 4.76     Hemoglobin 01/07/2019 13.5*    Hematocrit 01/07/2019 42.5     MCV 01/07/2019 89     MCH 01/07/2019 28.4     MCHC 01/07/2019 31.8*    RDW 01/07/2019 12.8     Platelets 01/07/2019 205     MPV 01/07/2019 10.9     Immature Granulocytes 01/07/2019 0.3     Gran # (ANC) 01/07/2019 2.0     Immature Grans (Abs) 01/07/2019 0.01     Lymph # 01/07/2019 1.3     Mono # 01/07/2019 0.4     Eos # 01/07/2019 0.1     Baso # 01/07/2019 0.02     nRBC 01/07/2019 0     Gran% 01/07/2019 53.0     Lymph% 01/07/2019 34.8     Mono% 01/07/2019 10.1     Eosinophil% 01/07/2019 1.3     Basophil% 01/07/2019 0.5     Differential Method 01/07/2019 Automated     Sodium 01/07/2019 140     Potassium 01/07/2019 5.0     Chloride 01/07/2019 104     CO2 01/07/2019 28     Glucose 01/07/2019 82     BUN, Bld 01/07/2019 16     Creatinine 01/07/2019 0.8     Calcium 01/07/2019 9.7     Total Protein 01/07/2019 7.2     Albumin 01/07/2019 4.1     Total Bilirubin 01/07/2019 0.5     Alkaline Phosphatase 01/07/2019 72     AST 01/07/2019 25     ALT 01/07/2019 28     Anion Gap 01/07/2019 8     eGFR if African American 01/07/2019 >60.0     eGFR if non  Amer* 01/07/2019 >60.0     Cholesterol 01/07/2019 191     Triglycerides 01/07/2019 198*    HDL 01/07/2019 43     LDL Cholesterol 01/07/2019 108.4     HDL/Chol Ratio 01/07/2019 22.5     Total Cholesterol/HDL Ra* 01/07/2019 4.4     Non-HDL Cholesterol 01/07/2019 148     PSA, SCREEN 01/07/2019 0.18     TSH 01/07/2019 2.887        Assessment:       1. Routine general medical examination at health care facility    2. Mixed hyperlipidemia    3. Essential hypertension    4. SHAW on CPAP    5. Myalgia    6. Lateral epicondylitis of right elbow    7. Colon cancer screening        Plan:   HTN (hypertension)  Blood pressure is under good control.  We will continue the current regimen.  Will work on regular aerobic exercise and a low salt diet.         Hyperlipidemia  Cholesterol numbers look good.  We will continue the current regimen at this time.  Remain focused on low fat diet and high dietary fiber intake.      Juan was seen today for annual exam.    Diagnoses and all orders for this visit:    Routine general medical examination at health care facility  Comments:  Focus on good health habits, low fat diet, regular exercise, seatbelt use, sunscreen use    Mixed hyperlipidemia    Essential hypertension  Comments:  decrease amlodipine to 5 mg daily, add toprol xl 50 mg once daily. follow up in one month  Orders:  -     amLODIPine (NORVASC) 5 MG tablet; Take 1 tablet (5 mg total) by mouth once daily.  -     metoprolol succinate (TOPROL-XL) 50 MG 24 hr tablet; Take 1 tablet (50 mg total) by mouth once daily.    SHAW on CPAP  Comments:  make follow up in sleep clinic for discussion of options, not toloerating cpap well.    Myalgia  Comments:  Stop lipitor for two weeks, monitor symptoms report response    Lateral epicondylitis of right elbow  Comments:  ice at night, meloxicam 15 mg once daily, tennis elbow strap  Orders:  -     meloxicam (MOBIC) 15 MG tablet; Take 1 tablet (15 mg total) by mouth once daily.    Colon cancer screening  -     Case request GI: COLONOSCOPY     we are going to cut back on his amlodipine and add a beta-blocker to help with his situational anxiety.  He has issues with public speaking that we have been trying to address.  We will see how the beta-blocker may help with that.  Will see him back in a few weeks to reassess on his elbow and the beta-blocker.      Follow-up in about 4 weeks (around 2/14/2019).

## 2019-01-21 RX ORDER — ALPRAZOLAM 0.25 MG/1
0.25 TABLET ORAL 2 TIMES DAILY PRN
Qty: 30 TABLET | Refills: 0 | Status: SHIPPED | OUTPATIENT
Start: 2019-01-21 | End: 2022-05-12

## 2019-01-24 ENCOUNTER — TELEPHONE (OUTPATIENT)
Dept: ENDOSCOPY | Facility: HOSPITAL | Age: 49
End: 2019-01-24

## 2019-01-25 ENCOUNTER — TELEPHONE (OUTPATIENT)
Dept: ENDOSCOPY | Facility: HOSPITAL | Age: 49
End: 2019-01-25

## 2019-01-25 RX ORDER — SODIUM, POTASSIUM,MAG SULFATES 17.5-3.13G
1 SOLUTION, RECONSTITUTED, ORAL ORAL DAILY
Qty: 1 KIT | Refills: 0 | Status: SHIPPED | OUTPATIENT
Start: 2019-01-25 | End: 2019-01-27

## 2019-01-25 NOTE — TELEPHONE ENCOUNTER
Endoscopy Scheduling Questionnaire:    Call Type:Incoming call via [employer]    1. Have you been admitted overnight to the hospital in the past 3 months? no  2. Do you get CP and SOB while walking up a flight of stairs? no  3. Have you had a stent placed in the past 12 months? no  4. Have you had a stroke or heart attack in the past 6 months? no  5. Have you had any chest pain in the past 3 months? no      If so, have you been evaluated by your PCP or Cardiologist? no  6. Do you take weight loss medications? no  7. Have you been diagnosed with Diverticulitis within the past 3 months? no  8. Are you having any GI symptoms that you feel need to be evaluated prior to your procedure? no  9. Are you on dialysis? no  10. Are you diabetic? no  11. Do you have any other health issues that you feel might limit your ability to safely have the procedure and/or sedation? no  12. Is the patient over 81 yo? no        If so, has the patient been seen by their PCP or GI in the last 3 months? N/A       -I have reviewed the last colonoscopy for recommendations regarding surveillance and bowel prep  is NA  -I have reviewed the patient's medications and allergies. He is not on high risk medications and will require cardiac clearance. A clearance request NA  -I have verified the pharmacy information. The prep being used is Suprep. The patient's prep instructions were sent via [facility] patient portal..    Date Endoscopy Scheduled: Date: March 15, 2019  Or  Date Gastro office visit Scheduled: NA

## 2019-01-31 ENCOUNTER — PROCEDURE VISIT (OUTPATIENT)
Dept: DERMATOLOGY | Facility: CLINIC | Age: 49
End: 2019-01-31
Payer: OTHER GOVERNMENT

## 2019-01-31 DIAGNOSIS — D48.5 NEOPLASM OF UNCERTAIN BEHAVIOR OF SKIN: Primary | ICD-10-CM

## 2019-01-31 PROCEDURE — 88304 TISSUE EXAM BY PATHOLOGIST: CPT | Performed by: PATHOLOGY

## 2019-01-31 PROCEDURE — 11402 EXC TR-EXT B9+MARG 1.1-2 CM: CPT | Mod: 51,S$PBB,, | Performed by: STUDENT IN AN ORGANIZED HEALTH CARE EDUCATION/TRAINING PROGRAM

## 2019-01-31 PROCEDURE — 12031 INTMD RPR S/A/T/EXT 2.5 CM/<: CPT | Mod: PBBFAC | Performed by: STUDENT IN AN ORGANIZED HEALTH CARE EDUCATION/TRAINING PROGRAM

## 2019-01-31 PROCEDURE — 99499 UNLISTED E&M SERVICE: CPT | Mod: S$PBB,,, | Performed by: STUDENT IN AN ORGANIZED HEALTH CARE EDUCATION/TRAINING PROGRAM

## 2019-01-31 PROCEDURE — 88304 TISSUE EXAM BY PATHOLOGIST: CPT | Mod: 26,,, | Performed by: PATHOLOGY

## 2019-01-31 PROCEDURE — 11402 PR EXC SKIN BENIG 1.1-2 CM TRUNK,ARM,LEG: ICD-10-PCS | Mod: 51,S$PBB,, | Performed by: STUDENT IN AN ORGANIZED HEALTH CARE EDUCATION/TRAINING PROGRAM

## 2019-01-31 PROCEDURE — 99499 NO LOS: ICD-10-PCS | Mod: S$PBB,,, | Performed by: STUDENT IN AN ORGANIZED HEALTH CARE EDUCATION/TRAINING PROGRAM

## 2019-01-31 PROCEDURE — 12031 INTMD RPR S/A/T/EXT 2.5 CM/<: CPT | Mod: S$PBB,,, | Performed by: STUDENT IN AN ORGANIZED HEALTH CARE EDUCATION/TRAINING PROGRAM

## 2019-01-31 PROCEDURE — 12032 INTMD RPR S/A/T/EXT 2.6-7.5: CPT | Mod: PBBFAC,PN | Performed by: STUDENT IN AN ORGANIZED HEALTH CARE EDUCATION/TRAINING PROGRAM

## 2019-01-31 PROCEDURE — 11402 EXC TR-EXT B9+MARG 1.1-2 CM: CPT | Mod: PBBFAC,PN | Performed by: STUDENT IN AN ORGANIZED HEALTH CARE EDUCATION/TRAINING PROGRAM

## 2019-01-31 PROCEDURE — 88304 TISSUE SPECIMEN TO PATHOLOGY, DERMATOLOGY: ICD-10-PCS | Mod: 26,,, | Performed by: PATHOLOGY

## 2019-01-31 PROCEDURE — 12031 PR LAYR CLOS WND TRUNK,ARM,LEG <2.5 CM: ICD-10-PCS | Mod: S$PBB,,, | Performed by: STUDENT IN AN ORGANIZED HEALTH CARE EDUCATION/TRAINING PROGRAM

## 2019-01-31 NOTE — PATIENT INSTRUCTIONS

## 2019-01-31 NOTE — PROGRESS NOTES
PROCEDURE: Elliptical excision with intermediate layered repair    ANESTHETIC: 10 cc 1% Lidocaine with Epinephrine 1:100,000    SURGICAL PREP: Betadine and EtOH    SURGEON: Simon Palmer MD    ASSISTANTS:  Lorena Ahn LPN    PREOPERATIVE DIAGNOSIS: neoplasm uncertain behavior     POSTOPERATIVE DIAGNOSIS: neoplasm uncertain behavior    PATHOLOGIC DIAGNOSIS: Pending    LOCATION: left shoulder    INITIAL LESION SIZE: 1.5 cm    EXCISED DIAMETER: 1.5 cm    PREPARATION:  The diagnosis, procedure, alternatives, benefits and risks, including but not limited to: drug reactions, pain, scar or cosmetic defect, local sensation disturbances, and/or recurrence of present condition were explained to the patient. The patient elected to proceed.    PROCEDURE:  The area of the left shoulder was prepped, draped, and anesthetized in the usual sterile fashion. Lesional tissue was carefully marked prior to administration of the anesthesia. An elliptical excision was drawn around the lesion. A fusiform elliptical excision was done with a #15 blade carried down completely through the dermis into the subcutaneous tissues. Then, with a combination of blunt and sharp dissection, the lesion was removed.  The specimen was submitted for histologic evaluation. The operative site was widely undermined in the subcutaneous tissue plane. Then, electrocoagulation and 4-0 vicryl suture ties was used to obtain hemostasis. Blood loss was minimal. The wound was then approximated in a layered fashion with subcutaneous and intradermal 4-0 Vicryl sutures. The wound was then superficially closed with running 4-0 Ethilon sutures.    The patient tolerated the procedure well.    The area was cleaned and dressed appropriately and the patient was given wound care instructions, as well as an appointment for follow-up evaluation.    LENGTH OF REPAIR: 1.5 cm    There were no vitals filed for this visit.    Follow-up in about 2 weeks (around 2/14/2019) for suture  removal.

## 2019-02-10 RX ORDER — ATORVASTATIN CALCIUM 20 MG/1
TABLET, FILM COATED ORAL
Qty: 90 TABLET | Refills: 3 | Status: SHIPPED | OUTPATIENT
Start: 2019-02-10 | End: 2020-02-05

## 2019-02-14 ENCOUNTER — CLINICAL SUPPORT (OUTPATIENT)
Dept: DERMATOLOGY | Facility: CLINIC | Age: 49
End: 2019-02-14
Payer: OTHER GOVERNMENT

## 2019-02-14 DIAGNOSIS — Z48.02 VISIT FOR SUTURE REMOVAL: Primary | ICD-10-CM

## 2019-02-14 PROCEDURE — 99024 POSTOP FOLLOW-UP VISIT: CPT | Mod: ,,, | Performed by: STUDENT IN AN ORGANIZED HEALTH CARE EDUCATION/TRAINING PROGRAM

## 2019-02-14 PROCEDURE — 99024 PR POST-OP FOLLOW-UP VISIT: ICD-10-PCS | Mod: ,,, | Performed by: STUDENT IN AN ORGANIZED HEALTH CARE EDUCATION/TRAINING PROGRAM

## 2019-03-15 ENCOUNTER — ANESTHESIA EVENT (OUTPATIENT)
Dept: ENDOSCOPY | Facility: HOSPITAL | Age: 49
End: 2019-03-15
Payer: OTHER GOVERNMENT

## 2019-03-15 ENCOUNTER — HOSPITAL ENCOUNTER (OUTPATIENT)
Facility: HOSPITAL | Age: 49
Discharge: HOME | End: 2019-03-15
Attending: COLON & RECTAL SURGERY | Admitting: COLON & RECTAL SURGERY
Payer: OTHER GOVERNMENT

## 2019-03-15 ENCOUNTER — ANESTHESIA (OUTPATIENT)
Dept: ENDOSCOPY | Facility: HOSPITAL | Age: 49
End: 2019-03-15
Payer: OTHER GOVERNMENT

## 2019-03-15 VITALS
HEART RATE: 65 BPM | WEIGHT: 245.13 LBS | DIASTOLIC BLOOD PRESSURE: 86 MMHG | RESPIRATION RATE: 17 BRPM | TEMPERATURE: 98 F | SYSTOLIC BLOOD PRESSURE: 148 MMHG | OXYGEN SATURATION: 99 % | BODY MASS INDEX: 33.2 KG/M2 | HEIGHT: 72 IN

## 2019-03-15 DIAGNOSIS — Z12.11 SCREENING FOR COLON CANCER: ICD-10-CM

## 2019-03-15 PROCEDURE — 88305 TISSUE SPECIMEN TO PATHOLOGY - SURGERY: ICD-10-PCS | Mod: 26,,, | Performed by: PATHOLOGY

## 2019-03-15 PROCEDURE — 88305 TISSUE EXAM BY PATHOLOGIST: CPT | Mod: 26,,, | Performed by: PATHOLOGY

## 2019-03-15 PROCEDURE — 37000008 HC ANESTHESIA 1ST 15 MINUTES: Performed by: COLON & RECTAL SURGERY

## 2019-03-15 PROCEDURE — 00811 ANES LWR INTST NDSC NOS: CPT | Performed by: COLON & RECTAL SURGERY

## 2019-03-15 PROCEDURE — 25000003 PHARM REV CODE 250: Performed by: COLON & RECTAL SURGERY

## 2019-03-15 PROCEDURE — 37000009 HC ANESTHESIA EA ADD 15 MINS: Performed by: COLON & RECTAL SURGERY

## 2019-03-15 PROCEDURE — 27201089 HC SNARE, DISP (ANY): Performed by: COLON & RECTAL SURGERY

## 2019-03-15 PROCEDURE — 45385 PR COLONOSCOPY,REMV LESN,SNARE: ICD-10-PCS | Mod: 33,,, | Performed by: COLON & RECTAL SURGERY

## 2019-03-15 PROCEDURE — 45385 COLONOSCOPY W/LESION REMOVAL: CPT | Performed by: COLON & RECTAL SURGERY

## 2019-03-15 PROCEDURE — 88305 TISSUE EXAM BY PATHOLOGIST: CPT | Performed by: PATHOLOGY

## 2019-03-15 PROCEDURE — 63600175 PHARM REV CODE 636 W HCPCS: Performed by: NURSE ANESTHETIST, CERTIFIED REGISTERED

## 2019-03-15 PROCEDURE — 45385 COLONOSCOPY W/LESION REMOVAL: CPT | Mod: 33,,, | Performed by: COLON & RECTAL SURGERY

## 2019-03-15 RX ORDER — PROPOFOL 10 MG/ML
INJECTION, EMULSION INTRAVENOUS
Status: DISCONTINUED | OUTPATIENT
Start: 2019-03-15 | End: 2019-03-15

## 2019-03-15 RX ORDER — LIDOCAINE HCL/PF 100 MG/5ML
SYRINGE (ML) INTRAVENOUS
Status: DISCONTINUED | OUTPATIENT
Start: 2019-03-15 | End: 2019-03-15

## 2019-03-15 RX ORDER — SODIUM CHLORIDE, SODIUM LACTATE, POTASSIUM CHLORIDE, CALCIUM CHLORIDE 600; 310; 30; 20 MG/100ML; MG/100ML; MG/100ML; MG/100ML
INJECTION, SOLUTION INTRAVENOUS CONTINUOUS
Status: DISCONTINUED | OUTPATIENT
Start: 2019-03-15 | End: 2019-03-15 | Stop reason: HOSPADM

## 2019-03-15 RX ADMIN — SODIUM CHLORIDE, SODIUM LACTATE, POTASSIUM CHLORIDE, AND CALCIUM CHLORIDE: 600; 310; 30; 20 INJECTION, SOLUTION INTRAVENOUS at 01:03

## 2019-03-15 RX ADMIN — PROPOFOL 40 MG: 10 INJECTION, EMULSION INTRAVENOUS at 02:03

## 2019-03-15 RX ADMIN — SODIUM CHLORIDE, SODIUM LACTATE, POTASSIUM CHLORIDE, AND CALCIUM CHLORIDE: 600; 310; 30; 20 INJECTION, SOLUTION INTRAVENOUS at 02:03

## 2019-03-15 RX ADMIN — PROPOFOL 60 MG: 10 INJECTION, EMULSION INTRAVENOUS at 02:03

## 2019-03-15 RX ADMIN — LIDOCAINE HYDROCHLORIDE 100 MG: 20 INJECTION, SOLUTION INTRAVENOUS at 02:03

## 2019-03-15 RX ADMIN — PROPOFOL 140 MG: 10 INJECTION, EMULSION INTRAVENOUS at 02:03

## 2019-03-15 NOTE — BRIEF OP NOTE
[facility] -   Brief Operative Note     SUMMARY     Surgery Date: 3/15/2019     Surgeon(s) and Role:     * Philip Teague MD - Primary    Assisting Surgeon: None    Pre-op Diagnosis:  Screening [Z13.9]    Post-op Diagnosis:  Post-Op Diagnosis Codes:     * Screening [Z13.9]    Procedure(s) (LRB):  COLONOSCOPY (N/A)    Anesthesia: Choice    Description of the findings of the procedure: See Op Note    Findings/Key Components: See Op Note    Estimated Blood Loss: * No values recorded between 3/15/2019 12:00 AM and 3/15/2019  2:28 PM *         Specimens:   Specimen (12h ago, onward)    Start     Ordered    03/15/19 1423  Specimen to Pathology - Surgery  Once     Comments:  1.  sigmoid colon polyp     Start Status   03/15/19 1423 Collected (03/15/19 1426)       03/15/19 1426          Discharge Note    SUMMARY     Admit Date: 3/15/2019    Discharge Date and Time: 3/15/2019 2:28 PM    Hospital Course Patient was seen in the preoperative area by both myself and anesthesia. All consents were verified and all questions appropriately answered. All risks, benefits and alternatives explained to patient. Patient proceeded to endoscopy suite for colonoscopy and was discharged home postoperative once cleared by anesthesia.    Final Diagnosis: Post-Op Diagnosis Codes:     * Screening [Z13.9]    Disposition: Home or Self Care    Follow Up/Patient Instructions: See Provation report    Medications:  Reconciled Home Medications:      Medication List      CONTINUE taking these medications    ALPRAZolam 0.25 MG tablet  Commonly known as:  XANAX  Take 1 tablet (0.25 mg total) by mouth 2 (two) times daily as needed for Anxiety.     amLODIPine 5 MG tablet  Commonly known as:  NORVASC  Take 1 tablet (5 mg total) by mouth once daily.     aspirin 81 mg Tab  Take 1 tablet by mouth Daily.     atorvastatin 20 MG tablet  Commonly known as:  LIPITOR  TAKE 1 TABLET DAILY     citalopram 20 MG tablet  Commonly known as:  CELEXA  TAKE 1  TABLET DAILY     finasteride 1 mg tablet  Commonly known as:  PROPECIA  TAKE ONE TABLET BY MOUTH ONCE DAILY     losartan-hydrochlorothiazide 100-25 mg 100-25 mg per tablet  Commonly known as:  HYZAAR  TAKE 1 TABLET DAILY     meloxicam 15 MG tablet  Commonly known as:  MOBIC  Take 1 tablet (15 mg total) by mouth once daily.     metoprolol succinate 50 MG 24 hr tablet  Commonly known as:  TOPROL-XL  Take 1 tablet (50 mg total) by mouth once daily.     omeprazole 20 MG capsule  Commonly known as:  PRILOSEC  TAKE 1 CAPSULE DAILY     VIAGRA 50 MG tablet  Generic drug:  sildenafil  TAKE 1 TO 2 TABLETS 20 MINUTES PRIOR TO SEXUAL ACTIVITY          Discharge Procedure Orders   Diet general     Call MD for:  temperature >100.4     Call MD for:  persistent nausea and vomiting     Call MD for:  severe uncontrolled pain     Call MD for:  difficulty breathing, headache or visual disturbances     Call MD for:  redness, tenderness, or signs of infection (pain, swelling, redness, odor or green/yellow discharge around incision site)     Call MD for:  hives     Call MD for:  persistent dizziness or light-headedness     Call MD for:  extreme fatigue     Activity as tolerated     Follow-up Information     Nigel Naranjo MD.    Specialty:  Internal Medicine  Why:  As needed  Contact information:  [address] [facility] [place] [address] [phone]

## 2019-03-15 NOTE — [employer] PATIENT INSTRUCTIONS
Discharge Summary/Instructions after an Endoscopic Procedure  Patient Name: Juan Bateman  Patient MRN: [id number]  Patient YOB: 1970  Friday, March 15, 2019 Philip Teague MD  RESTRICTIONS:  During your procedure today, you received medications for sedation.  These   medications may affect your judgment, balance and coordination.  Therefore,   for 24 hours, you have the following restrictions:   - DO NOT drive a car, operate machinery, make legal/financial decisions,   sign important papers or drink alcohol.    ACTIVITY:  Today: no heavy lifting, straining or running due to procedural   sedation/anesthesia.  The following day: return to full activity including work.  DIET:  Eat and drink normally unless instructed otherwise.     TREATMENT FOR COMMON SIDE EFFECTS:  - Mild abdominal pain, nausea, belching, bloating or excessive gas:  rest,   eat lightly and use a heating pad.  - Sore Throat: treat with throat lozenges and/or gargle with warm salt   water.  - Because air was used during the procedure, expelling large amounts of air   from your rectum or belching is normal.  - If a bowel prep was taken, you may not have a bowel movement for 1-3 days.    This is normal.  SYMPTOMS TO WATCH FOR AND REPORT TO YOUR PHYSICIAN:  1. Abdominal pain or bloating, other than gas cramps.  2. Chest pain.  3. Back pain.  4. Signs of infection such as: chills or fever occurring within 24 hours   after the procedure.  5. Rectal bleeding, which would show as bright red, maroon, or black stools.   (A tablespoon of blood from the rectum is not serious, especially if   hemorrhoids are present.)  6. Vomiting.  7. Weakness or dizziness.  GO DIRECTLY TO THE NEAREST EMERGENCY ROOM IF YOU HAVE ANY OF THE FOLLOWING:      Difficulty breathing              Chills and/or fever over 101 F   Persistent vomiting and/or vomiting blood   Severe abdominal pain   Severe chest pain   Black, tarry stools   Bleeding- more than one  tablespoon   Any other symptom or condition that you feel may need urgent attention  Your doctor recommends these additional instructions:  If any biopsies were taken, your doctors clinic will contact you in 1 to 2   weeks with any results.  - Discharge patient to home.   - Resume previous diet.   - Continue present medications.   - Await pathology results.   - Repeat colonoscopy date to be determined after pending pathology results   are reviewed for surveillance based on pathology results.   - Return to primary care physician PRN.  For questions, problems or results please call your physician Philip Teague MD at Work:  [phone]  If you have any questions about the above instructions, call the GI   department at [phone] or call the endoscopy unit at [phone]   from 7am until 3 pm.  [facility] [place], EMERGENCY ROOM PHONE NUMBER:   [phone]  IF A COMPLICATION OR EMERGENCY SITUATION ARISES AND YOU ARE UNABLE TO REACH   YOUR PHYSICIAN - GO DIRECTLY TO THE EMERGENCY ROOM.  I have read or have had read to me these discharge instructions for my   procedure and have received a written copy.  I understand these   instructions and will follow-up with my physician if I have any questions.     __________________________________       _____________________________________  Nurse Signature                                          Patient/Designated   Responsible Party Signature  MD Philip Blum MD  3/15/2019 2:27:46 PM  This report has been verified and signed electronically.  [employer]

## 2019-03-15 NOTE — ANESTHESIA POSTPROCEDURE EVALUATION
Anesthesia Post Evaluation    Patient: Juan Bateman    Procedure(s) Performed: Procedure(s) (LRB):  COLONOSCOPY (N/A)    Final Anesthesia Type: MAC  Patient location during evaluation: PACU  Patient participation: Yes- Able to Participate  Level of consciousness: awake and alert and oriented  Post-procedure vital signs: reviewed and stable  Pain management: adequate  PONV status at discharge: No PONV  Anesthetic complications: no      Cardiovascular status: blood pressure returned to baseline  Respiratory status: unassisted, room air and spontaneous ventilation  Hydration status: euvolemic  Follow-up not needed.        Visit Vitals  /77 (BP Location: Left arm, Patient Position: Lying)   Pulse 71   Temp 36.6 °C (97.9 °F) (Tympanic)   Resp 17   Ht 6' (1.829 m)   Wt 111.2 kg (245 lb 2.4 oz)   SpO2 95%   BMI 33.25 kg/m²       Pain/Paul Score: Paul Score: 9 (3/15/2019  2:30 PM)

## 2019-03-15 NOTE — H&P
[facility]  Colon and Rectal Surgery  History & Physical    Patient Name: Juan Bateman  MRN: [id number]  Admission Date: 3/15/2019  Attending Physician: Philip Teague MD  Primary Care Provider: Nigel Naranjo MD    Patient information was obtained from patient and medical records.    Subjective:     Chief Complaint/Reason for Admission: Here for Colonoscopy    History of Present Illness:  Patient is a 48 y.o. male presents for colonoscopy. Never had colonoscopy before. Endorses possible hemorrhoids and occasional blood when wiping. No melena or change in bowel habits. No personal or fam hx of CRC, polyps or IBD.    No current facility-administered medications on file prior to encounter.      Current Outpatient Medications on File Prior to Encounter   Medication Sig    aspirin 81 mg Tab Take 1 tablet by mouth Daily.    citalopram (CELEXA) 20 MG tablet TAKE 1 TABLET DAILY    losartan-hydrochlorothiazide 100-25 mg (HYZAAR) 100-25 mg per tablet TAKE 1 TABLET DAILY    meloxicam (MOBIC) 15 MG tablet Take 1 tablet (15 mg total) by mouth once daily.    metoprolol succinate (TOPROL-XL) 50 MG 24 hr tablet Take 1 tablet (50 mg total) by mouth once daily.    omeprazole (PRILOSEC) 20 MG capsule TAKE 1 CAPSULE DAILY    VIAGRA 50 mg tablet TAKE 1 TO 2 TABLETS 20 MINUTES PRIOR TO SEXUAL ACTIVITY    amLODIPine (NORVASC) 5 MG tablet Take 1 tablet (5 mg total) by mouth once daily.    finasteride (PROPECIA) 1 mg tablet TAKE ONE TABLET BY MOUTH ONCE DAILY       Review of patient's allergies indicates:   Allergen Reactions    No known drug allergies        Past Medical History:   Diagnosis Date    Erectile dysfunction     HTN (hypertension)     Hyperlipidemia     SHAW (obstructive sleep apnea)     PONV (postoperative nausea and vomiting)      Past Surgical History:   Procedure Laterality Date    ARTHROSCOPY-KNEE Right 7/13/2016    Per[facility] Sven [facility] Sr[facility] OR     ARTHROSCOPY-KNEE W/ CHONDROPLASTY, Left 12/11/2015    Performed by Sven [facility] Sr[facility] at [facility] OR    ARTHROSCOPY-KNEE-LEFT Left 12/11/2015    Performed by Sven Sal Sr[facility] MD at [facility] OR    ARTHROSCOPY-MENISCECTOMY Right 7/13/2016    Performed by Sven Sal Sr[facility] MD at [facility] OR    ARTHROSCOPY-MENISCECTOMY Left 12/11/2015    Performed by Sven Sal Sr., MD at [facility] OR    EXCISION EXOSTOSIS Left 12/11/2015    Performed by Sven Sal Sr[facility] MD at [facility] OR    eye surgery      trauma at age 16    KNEE SURGERY      left knee x 3    SYNOVECTOMY-KNEE Right 7/13/2016    Performed by Sven Sal Sr., MD at [facility] OR    SYNOVECTOMY-KNEE Left 12/11/2015    Performed by Sven Sal Sr., MD at [facility] OR     Family History     Problem Relation (Age of Onset)    Diabetes Mother, Father    Heart disease Mother    Hodgkin's lymphoma Mother        Tobacco Use    Smoking status: Never Smoker    Smokeless tobacco: Never Used   Substance and Sexual Activity    Alcohol use: Yes     Comment: social    Drug use: No    Sexual activity: Yes     Review of Systems   Constitutional: Negative for activity change, appetite change, chills, fatigue, fever and unexpected weight change.   HENT: Negative for congestion, ear pain, sore throat and trouble swallowing.    Eyes: Negative for pain, redness and itching.   Respiratory: Negative for cough, shortness of breath and wheezing.    Cardiovascular: Negative for chest pain, palpitations and leg swelling.   Gastrointestinal: Positive for anal bleeding. Negative for abdominal distention, abdominal pain, blood in stool, constipation, diarrhea, nausea, rectal pain and vomiting.   Endocrine: Negative for cold intolerance, heat intolerance and polyuria.   Genitourinary: Negative for dysuria, flank pain, frequency and hematuria.   Musculoskeletal: Negative for gait problem, joint swelling and neck pain.   Skin: Negative for color change, rash and wound.   Allergic/Immunologic:  Negative for environmental allergies and immunocompromised state.   Neurological: Negative for dizziness, speech difficulty, weakness and numbness.   Psychiatric/Behavioral: Negative for agitation, confusion and hallucinations.     Objective:     Vital Signs (Most Recent):  Temp: 97.9 °F (36.6 °C) (03/15/19 1255)  Pulse: 75 (03/15/19 1255)  Resp: 18 (03/15/19 1255)  BP: (!) 140/99 (03/15/19 1255)  SpO2: 95 % (03/15/19 1255) Vital Signs (24h Range):  Temp:  [97.9 °F (36.6 °C)] 97.9 °F (36.6 °C)  Pulse:  [75] 75  Resp:  [18] 18  SpO2:  [95 %] 95 %  BP: (140)/(99) 140/99     Weight: 111.2 kg (245 lb 2.4 oz)  Body mass index is 33.25 kg/m².    Physical Exam   Constitutional: He is oriented to person, place, and time. He appears well-developed.   HENT:   Head: Normocephalic and atraumatic.   Eyes: Conjunctivae and EOM are normal.   Neck: Normal range of motion. No thyromegaly present.   Cardiovascular: Normal rate and regular rhythm.   Pulmonary/Chest: Effort normal. No respiratory distress.   Abdominal: Soft. He exhibits no distension and no mass. There is no tenderness.   Musculoskeletal: Normal range of motion. He exhibits no edema or tenderness.   Neurological: He is alert and oriented to person, place, and time.   Skin: Skin is warm and dry. Capillary refill takes less than 2 seconds. No rash noted.   Psychiatric: He has a normal mood and affect.         Assessment/Plan:     Patient is a 48 y.o. male who presents for colonoscopy     - Ok to proceed to endoscopy suite for colonoscopy  - Consent obtained. All risks, benefits and alternatives fully explained to patient, including but not limited to bleeding, infection, perforation, and missed polyps. All questions appropriately answered to patient's satisfaction. Consent signed and placed on chart.    Active Diagnoses:    Diagnosis Date Noted POA    Screening for colon cancer [Z12.11] 03/15/2019 Not Applicable      Problems Resolved During this Admission:     VTE Risk  Mitigation (From admission, onward)    None          Philip Teague MD  Colon and Rectal Surgery  [facility]

## 2019-03-15 NOTE — TRANSFER OF CARE
Anesthesia Transfer of Care Note    Patient: Juan Bateman    Procedure(s) Performed: Procedure(s) (LRB):  COLONOSCOPY (N/A)    Patient location: PACU    Anesthesia Type: MAC    Transport from OR: Transported from OR on room air with adequate spontaneous ventilation    Post pain: adequate analgesia    Post assessment: no apparent anesthetic complications    Post vital signs: stable    Level of consciousness: sedated    Nausea/Vomiting: no nausea/vomiting    Complications: none    Transfer of care protocol was followed      Last vitals:   Visit Vitals  /77 (BP Location: Left arm, Patient Position: Lying)   Pulse 71   Temp 36.6 °C (97.9 °F) (Tympanic)   Resp 17   Ht 6' (1.829 m)   Wt 111.2 kg (245 lb 2.4 oz)   SpO2 95%   BMI 33.25 kg/m²

## 2019-03-15 NOTE — ANESTHESIA PREPROCEDURE EVALUATION
03/15/2019  Juan Bateman is a 48 y.o., male.    Pre-op Assessment    I have reviewed the Patient Summary Reports.     I have reviewed the Nursing Notes.   I have reviewed the Medications.     Review of Systems  Anesthesia Hx:  Hx of Anesthetic complications    Social:  Social Alcohol Use, Non-Smoker    Hematology/Oncology:  Hematology Normal        EENT/Dental:   chronic allergic rhinitis   Cardiovascular:   Hypertension, well controlled hyperlipidemia ECG has been reviewed. Normal sinus rhythm  Possible Left atrial enlargement  Borderline Abnormal ECG  When compared with ECG of 17-AUG-2017 06:59,  No significant change was found  Confirmed by MD LEANA, DARLIN ([id number]) on 11/29/2018 6:58:08 PM   Pulmonary:   Sleep Apnea    Renal/:  Renal/ Normal     Hepatic/GI:   Bowel Prep. 0800 last drink of fluid.  Wednesday last solid meal.   Musculoskeletal:   Arthritis     Neurological:  Neurology Normal    Dermatological:  Skin Normal    Psych:  Psychiatric Normal           Physical Exam  General:  Well nourished    Airway/Jaw/Neck:  Airway Findings: Mallampati: IV                Anesthesia Plan  Type of Anesthesia, risks & benefits discussed:  Anesthesia Type:  MAC  Patient's Preference:   Intra-op Monitoring Plan:   Intra-op Monitoring Plan Comments:   Post Op Pain Control Plan:   Post Op Pain Control Plan Comments:   Induction:   IV  Beta Blocker:  Patient is on a Beta-Blocker and has received one dose within the past 24 hours (No further documentation required).       Informed Consent: Patient understands risks and agrees with Anesthesia plan.  Questions answered. Anesthesia consent signed with patient.  ASA Score: 2     Day of Surgery Review of History & Physical: I have interviewed and examined the patient. I have reviewed the patient's H&P dated: 03/15/19. There are no significant changes.  H&P update  referred to the surgeon.

## 2019-03-15 NOTE — DISCHARGE INSTRUCTIONS

## 2019-03-15 NOTE — ANESTHESIA RELEASE NOTE
Anesthesia Release from PACU Note    Patient: Juan Bateman    Procedure(s) Performed: Procedure(s) (LRB):  COLONOSCOPY (N/A)    Anesthesia type: MAC    Post pain: Adequate analgesia    Post assessment: no apparent anesthetic complications, tolerated procedure well and no evidence of recall    Last Vitals:   Visit Vitals  /77 (BP Location: Left arm, Patient Position: Lying)   Pulse 71   Temp 36.6 °C (97.9 °F) (Tympanic)   Resp 17   Ht 6' (1.829 m)   Wt 111.2 kg (245 lb 2.4 oz)   SpO2 95%   BMI 33.25 kg/m²       Post vital signs: stable    Level of consciousness: awake and alert     Nausea/Vomiting: no nausea/no vomiting    Complications: none    Airway Patency: patent    Respiratory: unassisted, spontaneous ventilation, room air    Cardiovascular: stable    Hydration: euvolemic

## 2019-03-20 RX ORDER — OMEPRAZOLE 20 MG/1
CAPSULE, DELAYED RELEASE ORAL
Qty: 90 CAPSULE | Refills: 3 | Status: SHIPPED | OUTPATIENT
Start: 2019-03-20 | End: 2020-03-26

## 2019-03-29 RX ORDER — SILDENAFIL 50 MG/1
TABLET, FILM COATED ORAL
Qty: 30 TABLET | Refills: 9 | Status: SHIPPED | OUTPATIENT
Start: 2019-03-29 | End: 2020-06-22

## 2019-05-06 ENCOUNTER — OFFICE VISIT (OUTPATIENT)
Dept: INTERNAL MEDICINE | Facility: CLINIC | Age: 49
End: 2019-05-06
Payer: OTHER GOVERNMENT

## 2019-05-06 VITALS
BODY MASS INDEX: 34.38 KG/M2 | DIASTOLIC BLOOD PRESSURE: 106 MMHG | OXYGEN SATURATION: 96 % | HEART RATE: 96 BPM | WEIGHT: 253.5 LBS | SYSTOLIC BLOOD PRESSURE: 136 MMHG | TEMPERATURE: 98 F

## 2019-05-06 DIAGNOSIS — M54.50 ACUTE LEFT-SIDED LOW BACK PAIN WITHOUT SCIATICA: Primary | ICD-10-CM

## 2019-05-06 PROCEDURE — 99999 PR PBB SHADOW E&M-EST. PATIENT-LVL III: ICD-10-PCS | Mod: PBBFAC,,, | Performed by: INTERNAL MEDICINE

## 2019-05-06 PROCEDURE — 99214 PR OFFICE/OUTPT VISIT, EST, LEVL IV, 30-39 MIN: ICD-10-PCS | Mod: S$PBB,,, | Performed by: INTERNAL MEDICINE

## 2019-05-06 PROCEDURE — 99213 OFFICE O/P EST LOW 20 MIN: CPT | Mod: PBBFAC | Performed by: INTERNAL MEDICINE

## 2019-05-06 PROCEDURE — 99999 PR PBB SHADOW E&M-EST. PATIENT-LVL III: CPT | Mod: PBBFAC,,, | Performed by: INTERNAL MEDICINE

## 2019-05-06 PROCEDURE — 99214 OFFICE O/P EST MOD 30 MIN: CPT | Mod: S$PBB,,, | Performed by: INTERNAL MEDICINE

## 2019-05-06 RX ORDER — CYCLOBENZAPRINE HCL 10 MG
TABLET ORAL
Qty: 30 TABLET | Refills: 0 | Status: SHIPPED | OUTPATIENT
Start: 2019-05-06 | End: 2019-09-11

## 2019-05-06 RX ORDER — NAPROXEN 500 MG/1
500 TABLET ORAL 2 TIMES DAILY WITH MEALS
Qty: 30 TABLET | Refills: 0 | Status: SHIPPED | OUTPATIENT
Start: 2019-05-06 | End: 2019-11-19

## 2019-05-06 NOTE — PATIENT INSTRUCTIONS
Tylenol 500 to 1000 mg every 8 hours as needed for pain.     No ibuprofen, aleve, naprosyn, motrin, advil while on naproxen.

## 2019-05-06 NOTE — PROGRESS NOTES
Subjective:      Patient ID: Juan Bateman is a 48 y.o. male.    Chief Complaint: Back Pain  49 yo with   Patient Active Problem List   Diagnosis    HTN (hypertension)    Hyperlipidemia    Erectile dysfunction    Tear of medial meniscus of left knee    SHAW on CPAP    Tear of meniscus of left knee    Chondromalacia of left knee    Right knee meniscal tear    Effusion of right knee    Transient synovitis, right knee    Knee pain    Family history of arteriosclerotic cardiovascular disease    Pain in both lower legs    Screening for colon cancer     Past Medical History:   Diagnosis Date    Erectile dysfunction     HTN (hypertension)     Hyperlipidemia     SHAW (obstructive sleep apnea)     PONV (postoperative nausea and vomiting)      Here today c/o back pain.   Back Pain   This is a new problem. The current episode started in the past 7 days. The problem occurs constantly. The problem has been waxing and waning since onset. Pain location: left lower back. The quality of the pain is described as aching and stabbing. The pain does not radiate. The pain is moderate. The pain is worse during the day. The symptoms are aggravated by lying down. Pertinent negatives include no abdominal pain, bladder incontinence, bowel incontinence, chest pain, dysuria, fever, headaches, leg pain, numbness, paresis, paresthesias, pelvic pain, tingling or weakness. Risk factors include lack of exercise and obesity. Treatments tried: tylenol pm, ibuprofen 1000mg. The treatment provided mild relief.      49 yo with   Patient Active Problem List   Diagnosis    HTN (hypertension)    Hyperlipidemia    Erectile dysfunction    Tear of medial meniscus of left knee    SHAW on CPAP    Tear of meniscus of left knee    Chondromalacia of left knee    Right knee meniscal tear    Effusion of right knee    Transient synovitis, right knee    Knee pain    Family history of arteriosclerotic cardiovascular disease    Pain  in both lower legs    Screening for colon cancer     Past Medical History:   Diagnosis Date    Erectile dysfunction     HTN (hypertension)     Hyperlipidemia     SHAW (obstructive sleep apnea)     PONV (postoperative nausea and vomiting)      Here today c/o here today c/o back pain.   Review of Systems   Constitutional: Negative for fever.   Cardiovascular: Negative for chest pain.   Gastrointestinal: Negative for abdominal pain and bowel incontinence.   Genitourinary: Negative for bladder incontinence, dysuria and pelvic pain.   Musculoskeletal: Positive for back pain.   Neurological: Negative for tingling, weakness, numbness, headaches and paresthesias.     Objective:   BP (!) 136/106 (BP Location: Right arm, Patient Position: Sitting, BP Method: Large (Manual))   Pulse 96   Temp 98.1 °F (36.7 °C) (Tympanic)   Wt 115 kg (253 lb 8.5 oz)   SpO2 96%   BMI 34.38 kg/m²     Physical Exam   Constitutional: He is oriented to person, place, and time. He appears well-developed and well-nourished. No distress.   HENT:   Head: Normocephalic and atraumatic.   Eyes: Conjunctivae and EOM are normal.   Cardiovascular: Normal rate.   Pulmonary/Chest: Effort normal and breath sounds normal.   Abdominal: Soft. Bowel sounds are normal.   Musculoskeletal: He exhibits no edema.   Neurological: He is alert and oriented to person, place, and time. He displays normal reflexes. No cranial nerve deficit. He exhibits normal muscle tone. Coordination normal.   Skin: Skin is warm and dry.   Psychiatric: He has a normal mood and affect. His behavior is normal.   slt neg haseeb    Assessment:     1. Acute left-sided low back pain without sciatica      Plan:   Acute left-sided low back pain without sciatica  -     cyclobenzaprine (FLEXERIL) 10 MG tablet; 1/2 to one tab po qhs prn muscle spasm  Dispense: 30 tablet; Refill: 0  -     naproxen (NAPROSYN) 500 MG tablet; Take 1 tablet (500 mg total) by mouth 2 (two) times daily with meals. For  pain and inflammation  Dispense: 30 tablet; Refill: 0            Problem List Items Addressed This Visit     None      Visit Diagnoses     Acute left-sided low back pain without sciatica    -  Primary    Relevant Medications    cyclobenzaprine (FLEXERIL) 10 MG tablet    naproxen (NAPROSYN) 500 MG tablet          Follow up if symptoms worsen or fail to improve.

## 2019-05-14 RX ORDER — FINASTERIDE 1 MG/1
TABLET, FILM COATED ORAL
Qty: 90 TABLET | Refills: 3 | Status: SHIPPED | OUTPATIENT
Start: 2019-05-14 | End: 2020-08-16 | Stop reason: SDUPTHER

## 2019-06-27 ENCOUNTER — OFFICE VISIT (OUTPATIENT)
Dept: URGENT CARE | Facility: CLINIC | Age: 49
End: 2019-06-27
Payer: OTHER GOVERNMENT

## 2019-06-27 VITALS
DIASTOLIC BLOOD PRESSURE: 90 MMHG | WEIGHT: 252.19 LBS | BODY MASS INDEX: 34.16 KG/M2 | HEART RATE: 88 BPM | OXYGEN SATURATION: 96 % | SYSTOLIC BLOOD PRESSURE: 149 MMHG | TEMPERATURE: 100 F | HEIGHT: 72 IN

## 2019-06-27 DIAGNOSIS — Z20.828 EXPOSURE TO INFLUENZA: ICD-10-CM

## 2019-06-27 DIAGNOSIS — R09.82 PND (POST-NASAL DRIP): ICD-10-CM

## 2019-06-27 DIAGNOSIS — R05.9 COUGH: ICD-10-CM

## 2019-06-27 DIAGNOSIS — J02.9 SORE THROAT: ICD-10-CM

## 2019-06-27 DIAGNOSIS — R68.89 FLU-LIKE SYMPTOMS: Primary | ICD-10-CM

## 2019-06-27 DIAGNOSIS — M79.10 MYALGIA: ICD-10-CM

## 2019-06-27 DIAGNOSIS — R50.9 FEVER, UNSPECIFIED FEVER CAUSE: ICD-10-CM

## 2019-06-27 LAB
CTP QC/QA: YES
CTP QC/QA: YES
POC MOLECULAR INFLUENZA A AGN: NEGATIVE
POC MOLECULAR INFLUENZA B AGN: NEGATIVE
S PYO RRNA THROAT QL PROBE: NEGATIVE

## 2019-06-27 PROCEDURE — 87081 CULTURE SCREEN ONLY: CPT

## 2019-06-27 PROCEDURE — 87502 INFLUENZA DNA AMP PROBE: CPT | Mod: PBBFAC,PO | Performed by: NURSE PRACTITIONER

## 2019-06-27 PROCEDURE — 99999 PR PBB SHADOW E&M-EST. PATIENT-LVL IV: ICD-10-PCS | Mod: PBBFAC,,, | Performed by: NURSE PRACTITIONER

## 2019-06-27 PROCEDURE — 99214 OFFICE O/P EST MOD 30 MIN: CPT | Mod: PBBFAC,PO | Performed by: NURSE PRACTITIONER

## 2019-06-27 PROCEDURE — 87880 STREP A ASSAY W/OPTIC: CPT | Mod: PBBFAC,PO | Performed by: NURSE PRACTITIONER

## 2019-06-27 PROCEDURE — 99214 PR OFFICE/OUTPT VISIT, EST, LEVL IV, 30-39 MIN: ICD-10-PCS | Mod: S$PBB,,, | Performed by: NURSE PRACTITIONER

## 2019-06-27 PROCEDURE — 99999 PR PBB SHADOW E&M-EST. PATIENT-LVL IV: CPT | Mod: PBBFAC,,, | Performed by: NURSE PRACTITIONER

## 2019-06-27 PROCEDURE — 99214 OFFICE O/P EST MOD 30 MIN: CPT | Mod: S$PBB,,, | Performed by: NURSE PRACTITIONER

## 2019-06-27 RX ORDER — FLUTICASONE PROPIONATE 50 MCG
2 SPRAY, SUSPENSION (ML) NASAL DAILY
Qty: 16 G | Refills: 0 | Status: SHIPPED | OUTPATIENT
Start: 2019-06-27 | End: 2019-07-27

## 2019-06-27 RX ORDER — OSELTAMIVIR PHOSPHATE 75 MG/1
75 CAPSULE ORAL 2 TIMES DAILY
Qty: 10 CAPSULE | Refills: 0 | Status: SHIPPED | OUTPATIENT
Start: 2019-06-27 | End: 2019-07-02

## 2019-06-27 RX ORDER — CETIRIZINE HYDROCHLORIDE 10 MG/1
10 TABLET ORAL DAILY
Qty: 30 TABLET | Refills: 0 | Status: SHIPPED | OUTPATIENT
Start: 2019-06-27 | End: 2021-02-22

## 2019-06-27 RX ORDER — PROMETHAZINE HYDROCHLORIDE AND DEXTROMETHORPHAN HYDROBROMIDE 6.25; 15 MG/5ML; MG/5ML
5 SYRUP ORAL NIGHTLY PRN
Qty: 118 ML | Refills: 0 | Status: SHIPPED | OUTPATIENT
Start: 2019-06-27 | End: 2019-07-07

## 2019-06-27 NOTE — PROGRESS NOTES
Subjective:       Patient ID: Juan Bateman is a 49 y.o. male.    Chief Complaint: Generalized Body Aches (Fever, HA, Sore throat x's 2 days)    Pt is a 3 year old male to clinic today with complaints of fever, chills, myalgia, nasal drainage, diarrhea, cough and PND that began 2 days ago. Pts wife had influenza 2 weeks ago.     Sinus Problem   This is a new problem. The current episode started in the past 7 days. The problem has been gradually worsening since onset. The maximum temperature recorded prior to his arrival was 100.4 - 100.9 F. The fever has been present for 1 to 2 days. His pain is at a severity of 7/10. The pain is moderate. Associated symptoms include chills, congestion, coughing, headaches, sinus pressure and a sore throat. Pertinent negatives include no diaphoresis, ear pain, hoarse voice, neck pain, shortness of breath, sneezing or swollen glands. Past treatments include acetaminophen (mucinex, tylenol). The treatment provided mild relief.     Review of Systems   Constitutional: Positive for chills, fatigue and fever. Negative for diaphoresis.   HENT: Positive for congestion, postnasal drip, rhinorrhea, sinus pressure, sinus pain and sore throat. Negative for ear discharge, ear pain, hoarse voice, sneezing and trouble swallowing.    Respiratory: Positive for cough. Negative for chest tightness, shortness of breath and wheezing.    Cardiovascular: Negative for chest pain and palpitations.   Gastrointestinal: Positive for diarrhea. Negative for abdominal pain, nausea and vomiting.   Musculoskeletal: Positive for myalgias. Negative for back pain and neck pain.   Skin: Negative for rash.   Neurological: Positive for headaches. Negative for dizziness and light-headedness.       Objective:      Physical Exam   Constitutional: He is oriented to person, place, and time. He appears well-developed and well-nourished. No distress.   HENT:   Head: Normocephalic.   Right Ear: External ear and ear  canal normal. No tenderness. Tympanic membrane is not bulging. A middle ear effusion is present.   Left Ear: External ear and ear canal normal. No tenderness. Tympanic membrane is not bulging. A middle ear effusion is present.   Nose: Mucosal edema and rhinorrhea present. Right sinus exhibits maxillary sinus tenderness and frontal sinus tenderness. Left sinus exhibits maxillary sinus tenderness and frontal sinus tenderness.   Mouth/Throat: Uvula is midline, oropharynx is clear and moist and mucous membranes are normal. No oropharyngeal exudate, posterior oropharyngeal edema or posterior oropharyngeal erythema.   PND noted   Eyes: Pupils are equal, round, and reactive to light. Conjunctivae and EOM are normal.   Neck: Normal range of motion. Neck supple.   Cardiovascular: Normal rate, regular rhythm and normal heart sounds. Exam reveals no gallop and no friction rub.   No murmur heard.  Pulmonary/Chest: Effort normal and breath sounds normal. No accessory muscle usage or stridor. No apnea, no tachypnea and no bradypnea. No respiratory distress. He has no decreased breath sounds. He has no wheezes. He has no rhonchi. He has no rales.   Lymphadenopathy:        Head (right side): No submental, no submandibular and no tonsillar adenopathy present.        Head (left side): No submental, no submandibular and no tonsillar adenopathy present.     He has no cervical adenopathy.   Neurological: He is alert and oriented to person, place, and time.   Skin: Skin is warm and dry. No rash noted. He is not diaphoretic.   Psychiatric: He has a normal mood and affect. His speech is normal and behavior is normal. Thought content normal.   Nursing note and vitals reviewed.      Assessment:       1. Flu-like symptoms    2. Sore throat    3. Fever, unspecified fever cause    4. Myalgia    5. Exposure to influenza    6. Cough    7. PND (post-nasal drip)        Plan:   Flu-like symptoms  -     oseltamivir (TAMIFLU) 75 MG capsule; Take 1  capsule (75 mg total) by mouth 2 (two) times daily. for 5 days  Dispense: 10 capsule; Refill: 0  -     promethazine-dextromethorphan (PROMETHAZINE-DM) 6.25-15 mg/5 mL Syrp; Take 5 mLs by mouth nightly as needed.  Dispense: 118 mL; Refill: 0  -     fluticasone propionate (FLONASE) 50 mcg/actuation nasal spray; 2 sprays (100 mcg total) by Each Nare route once daily.  Dispense: 16 g; Refill: 0  -     cetirizine (ZYRTEC) 10 MG tablet; Take 1 tablet (10 mg total) by mouth once daily.  Dispense: 30 tablet; Refill: 0    Sore throat  -     Strep A culture, throat  -     POCT RAPID STREP A    Fever, unspecified fever cause  -     Strep A culture, throat  -     POCT RAPID STREP A  -     POCT Influenza A/B Molecular    Myalgia  -     Strep A culture, throat  -     POCT RAPID STREP A  -     POCT Influenza A/B Molecular    Exposure to influenza  -     oseltamivir (TAMIFLU) 75 MG capsule; Take 1 capsule (75 mg total) by mouth 2 (two) times daily. for 5 days  Dispense: 10 capsule; Refill: 0    Cough  -     promethazine-dextromethorphan (PROMETHAZINE-DM) 6.25-15 mg/5 mL Syrp; Take 5 mLs by mouth nightly as needed.  Dispense: 118 mL; Refill: 0  -     fluticasone propionate (FLONASE) 50 mcg/actuation nasal spray; 2 sprays (100 mcg total) by Each Nare route once daily.  Dispense: 16 g; Refill: 0    PND (post-nasal drip)  -     fluticasone propionate (FLONASE) 50 mcg/actuation nasal spray; 2 sprays (100 mcg total) by Each Nare route once daily.  Dispense: 16 g; Refill: 0  -     cetirizine (ZYRTEC) 10 MG tablet; Take 1 tablet (10 mg total) by mouth once daily.  Dispense: 30 tablet; Refill: 0      · Your symptoms are likely due to a viral infection. These infections can last up to 14 days, but you should notice some improvement of your symptoms within the first 7-10 days. Viral infections will not improve with antibiotics. If your symptoms persist >10 days without improvement or if you have any new or worsening symptoms, this is an  indication that you may have developed a bacterial infection and should return to your primary care provider or to Urgent Care.   · Getting plenty of rest is very important to fighting infections.  · Increase fluids.   · May apply warm compresses as needed for facial pain and congestion.   · Saline nasal spray to loosen nasal congestion.  · Flonase or Nasacort to reduce inflammation in the sinus cavities.  · You may take an over the counter antihistamine for allergy symptoms such as sneezing, itchy/watery eyes, scratchy throat, or congestion.  · Take Tylenol or Ibuprofen as needed for sore throat, body aches, or fever.  · Don't drive, drink alcohol, or take any other medication or substance that causes sedation while taking cough syrup.   · Follow up with your primary care provider if symptoms persist >10 days or sooner for any new or worsening symptoms.   · Go to the ER for any fever that does not improve with Tylenol/Ibuprofen, neck stiffness, rash, severe headache, vision changes, shortness of breath, chest pain, facial swelling, severe facial pain, or any other new and concerning symptoms.

## 2019-06-27 NOTE — PATIENT INSTRUCTIONS

## 2019-06-29 ENCOUNTER — OFFICE VISIT (OUTPATIENT)
Dept: URGENT CARE | Facility: CLINIC | Age: 49
End: 2019-06-29
Payer: OTHER GOVERNMENT

## 2019-06-29 VITALS
HEART RATE: 98 BPM | WEIGHT: 251.31 LBS | DIASTOLIC BLOOD PRESSURE: 82 MMHG | OXYGEN SATURATION: 95 % | TEMPERATURE: 99 F | SYSTOLIC BLOOD PRESSURE: 140 MMHG | BODY MASS INDEX: 34.04 KG/M2 | HEIGHT: 72 IN

## 2019-06-29 DIAGNOSIS — H60.391 OTHER INFECTIVE ACUTE OTITIS EXTERNA OF RIGHT EAR: ICD-10-CM

## 2019-06-29 DIAGNOSIS — L02.93 CARBUNCULOSIS: Primary | ICD-10-CM

## 2019-06-29 LAB — BACTERIA THROAT CULT: NORMAL

## 2019-06-29 PROCEDURE — 99999 PR PBB SHADOW E&M-EST. PATIENT-LVL IV: ICD-10-PCS | Mod: PBBFAC,,, | Performed by: FAMILY MEDICINE

## 2019-06-29 PROCEDURE — 99999 PR PBB SHADOW E&M-EST. PATIENT-LVL IV: CPT | Mod: PBBFAC,,, | Performed by: FAMILY MEDICINE

## 2019-06-29 PROCEDURE — 99214 OFFICE O/P EST MOD 30 MIN: CPT | Mod: S$PBB,,, | Performed by: FAMILY MEDICINE

## 2019-06-29 PROCEDURE — 99214 OFFICE O/P EST MOD 30 MIN: CPT | Mod: PBBFAC,PO | Performed by: FAMILY MEDICINE

## 2019-06-29 PROCEDURE — 99214 PR OFFICE/OUTPT VISIT, EST, LEVL IV, 30-39 MIN: ICD-10-PCS | Mod: S$PBB,,, | Performed by: FAMILY MEDICINE

## 2019-06-29 RX ORDER — SULFAMETHOXAZOLE AND TRIMETHOPRIM 800; 160 MG/1; MG/1
1 TABLET ORAL 2 TIMES DAILY
Qty: 14 TABLET | Refills: 0 | Status: SHIPPED | OUTPATIENT
Start: 2019-06-29 | End: 2019-07-01 | Stop reason: ALTCHOICE

## 2019-06-29 NOTE — PROGRESS NOTES
Subjective:       Patient ID: Juan Bateman is a 49 y.o. male.    Chief Complaint: Otalgia    BP (!) 140/82   Pulse 98   Temp 98.8 °F (37.1 °C)   Ht 6' (1.829 m)   Wt 114 kg (251 lb 5.2 oz)   SpO2 95%   BMI 34.09 kg/m²     HPI  Right earache. Drainage (+). Seen 2 days ago here for viral URI. On tamiflu    Review of Systems   Constitutional: Negative.  Negative for chills and fever.   HENT: Positive for congestion, ear discharge, ear pain and postnasal drip. Negative for sinus pressure and sore throat.    Respiratory: Negative for cough and chest tightness.    Cardiovascular: Negative.    Gastrointestinal: Negative.    Musculoskeletal: Negative.    Neurological: Negative.        Objective:      Physical Exam   Constitutional: He is oriented to person, place, and time. He appears well-developed and well-nourished. No distress.   HENT:   Head: Normocephalic and atraumatic.   Left Ear: External ear normal.   Mouth/Throat: No oropharyngeal exudate.   Right ear - TM- no erythema,  Several pustular papules noted on the wall of ear canal, serosanguinous substance expressed with a Qtip from one, the other two hyperemic. (+) surrounding tissue swelling    Eyes: Pupils are equal, round, and reactive to light. EOM are normal. Right eye exhibits no discharge. Left eye exhibits no discharge.   Neck: Normal range of motion. Neck supple.   Cardiovascular: Regular rhythm and normal heart sounds.   Pulmonary/Chest: Effort normal and breath sounds normal. He has no wheezes.   Musculoskeletal: Normal range of motion.   Lymphadenopathy:     He has no cervical adenopathy.   Neurological: He is alert and oriented to person, place, and time.   Skin: Skin is warm and dry. He is not diaphoretic.   Nursing note and vitals reviewed.      Assessment:       1. Carbunculosis    2. Other infective acute otitis externa of right ear        Plan:     Juan was seen today for otalgia.    Diagnoses and all orders for this  visit:    Carbunculosis - in external ear canal  -     Ambulatory referral to ENT    Other orders  -     sulfamethoxazole-trimethoprim 800-160mg (BACTRIM DS) 800-160 mg Tab; Take 1 tablet by mouth 2 (two) times daily. for 7 days      Handout provided

## 2019-06-29 NOTE — PATIENT INSTRUCTIONS
Understanding Carbuncles    A carbuncle is a painful boil under the skin. It happens when a group of hair follicles become infected. Follicles are the tiny holes from which hair grows out of your skin.  How to say it  Dariel   What causes carbuncles?  A carbuncle is caused by an infection with the bacteria Staphylococcus aureus. They are common on areas of the body where friction and sweat occur. They usually appear on the back of the neck, back, and thighs. This type of infection can also happen when the skin is injured, such as by a cut or bug bite.  The bacteria that causes carbuncles can spread easily from person to person. People at higher risk for boils are those with diabetes or a weak immune system. Drug users who use needles are also more likely to get them.  Symptoms of carbuncles  A carbuncle starts as a small painful bump. But it can grow quickly. It may become:  · Red  · Swollen  · Tender  Carbuncles may ooze pus. They may also cause fever and a general feeling of illness.  Treatment for carbuncles  A carbuncle may heal on its own in a few weeks. But the pus within it needs to come out first. Treatment options include:  · Warm compress. Putting a warm, wet washcloth on the boil will help the pus drain out. You should never try to pop a boil. That can cause the infection to spread.  · Surgical drainage. If the boil doesnt drain on its own, your healthcare provider may need to cut into it.  · Antibiotics. In some cases, oral antibiotics may be prescribed to fight the infection, especially if the carbuncle returns. You will likely have to take the medicine for 5 to 7 days. You may need to take it longer for a severe case.  · Good hygiene. Proper handwashing can prevent boils from spreading and coming back. Also wash things that have been in contact with the carbuncle in hot water. This includes items such as clothing and towels.  Complications of carbuncles  The main complication of a carbuncle  is the spreading of the infection. The bacteria can infect the heart and bone. It can also lead to septic shock, an infection of the entire body.  When to call your healthcare provider  Call your healthcare provider right away if you have any of these:  · Fever of 100.4°F (38°C) or higher, or as directed  · Redness, swelling, or fluid leaking from a carbuncle that gets worse  · Pain that gets worse  · Symptoms that dont get better, or get worse  · New symptoms   Date Last Reviewed: 5/1/2016 © 2000-2017 [employer]. [address] [place] [address]. All rights reserved. This information is not intended as a substitute for professional medical care. Always follow your healthcare professional's instructions.

## 2019-07-01 ENCOUNTER — OFFICE VISIT (OUTPATIENT)
Dept: OTOLARYNGOLOGY | Facility: CLINIC | Age: 49
End: 2019-07-01
Payer: OTHER GOVERNMENT

## 2019-07-01 VITALS
SYSTOLIC BLOOD PRESSURE: 140 MMHG | WEIGHT: 254.63 LBS | HEART RATE: 83 BPM | HEIGHT: 73 IN | BODY MASS INDEX: 33.75 KG/M2 | TEMPERATURE: 98 F | DIASTOLIC BLOOD PRESSURE: 92 MMHG

## 2019-07-01 DIAGNOSIS — H66.91 RIGHT OTITIS MEDIA, UNSPECIFIED OTITIS MEDIA TYPE: ICD-10-CM

## 2019-07-01 DIAGNOSIS — H60.501 ACUTE OTITIS EXTERNA OF RIGHT EAR, UNSPECIFIED TYPE: ICD-10-CM

## 2019-07-01 DIAGNOSIS — H92.11 OTORRHEA OF RIGHT EAR: Primary | ICD-10-CM

## 2019-07-01 PROCEDURE — 99999 PR PBB SHADOW E&M-EST. PATIENT-LVL IV: CPT | Mod: PBBFAC,,, | Performed by: PHYSICIAN ASSISTANT

## 2019-07-01 PROCEDURE — 99244 PR OFFICE CONSULTATION,LEVEL IV: ICD-10-PCS | Mod: 25,S$PBB,, | Performed by: PHYSICIAN ASSISTANT

## 2019-07-01 PROCEDURE — 99214 OFFICE O/P EST MOD 30 MIN: CPT | Mod: PBBFAC | Performed by: PHYSICIAN ASSISTANT

## 2019-07-01 PROCEDURE — 92504 EAR MICROSCOPY EXAMINATION: CPT | Mod: PBBFAC | Performed by: PHYSICIAN ASSISTANT

## 2019-07-01 PROCEDURE — 87102 FUNGUS ISOLATION CULTURE: CPT

## 2019-07-01 PROCEDURE — 92504 PR EAR MICROSCOPY EXAMINATION: ICD-10-PCS | Mod: S$PBB,,, | Performed by: PHYSICIAN ASSISTANT

## 2019-07-01 PROCEDURE — 99999 PR PBB SHADOW E&M-EST. PATIENT-LVL IV: ICD-10-PCS | Mod: PBBFAC,,, | Performed by: PHYSICIAN ASSISTANT

## 2019-07-01 PROCEDURE — 99244 OFF/OP CNSLTJ NEW/EST MOD 40: CPT | Mod: 25,S$PBB,, | Performed by: PHYSICIAN ASSISTANT

## 2019-07-01 PROCEDURE — 92504 EAR MICROSCOPY EXAMINATION: CPT | Mod: S$PBB,,, | Performed by: PHYSICIAN ASSISTANT

## 2019-07-01 PROCEDURE — 87070 CULTURE OTHR SPECIMN AEROBIC: CPT

## 2019-07-01 RX ORDER — FLUTICASONE PROPIONATE 50 MCG
2 SPRAY, SUSPENSION (ML) NASAL 2 TIMES DAILY
Qty: 9.9 ML | Refills: 11 | Status: SHIPPED | OUTPATIENT
Start: 2019-07-01 | End: 2022-05-12

## 2019-07-01 RX ORDER — AMOXICILLIN AND CLAVULANATE POTASSIUM 875; 125 MG/1; MG/1
1 TABLET, FILM COATED ORAL EVERY 12 HOURS
Qty: 20 TABLET | Refills: 0 | Status: SHIPPED | OUTPATIENT
Start: 2019-07-01 | End: 2019-07-08 | Stop reason: ALTCHOICE

## 2019-07-01 RX ORDER — METHYLPREDNISOLONE 4 MG/1
TABLET ORAL
Qty: 1 PACKAGE | Refills: 0 | Status: SHIPPED | OUTPATIENT
Start: 2019-07-01 | End: 2019-07-08 | Stop reason: ALTCHOICE

## 2019-07-01 RX ORDER — OFLOXACIN 3 MG/ML
SOLUTION/ DROPS OPHTHALMIC
Qty: 1 BOTTLE | Refills: 0 | Status: SHIPPED | OUTPATIENT
Start: 2019-07-01 | End: 2019-09-20 | Stop reason: ALTCHOICE

## 2019-07-01 RX ORDER — LEVOCETIRIZINE DIHYDROCHLORIDE 5 MG/1
5 TABLET, FILM COATED ORAL NIGHTLY
Qty: 30 TABLET | Refills: 11 | Status: SHIPPED | OUTPATIENT
Start: 2019-07-01 | End: 2021-02-22

## 2019-07-01 NOTE — LETTER
July 1, 2019      Elena Finley MD  [address] [place] [address]           [facility]  [address]  [place] [address]  Phone: [phone]  Fax: [phone]          Patient: Juan Bateman   MR Number: [id number]   YOB: 1970   Date of Visit: 7/1/2019       Dear Dr. Elena Finley:    Thank you for referring Juan Bateman to me for evaluation. Attached you will find relevant portions of my assessment and plan of care.    If you have questions, please do not hesitate to call me. I look forward to following Juan Bateman along with you.    Sincerely,    Summer Boggs PA-C    Enclosure  CC:  No Recipients    If you would like to receive this communication electronically, please contact [email or web address] or [phone] to request more information on [employer] Link access.    For providers and/or their staff who would like to refer a patient to [facility], please contact us through our one-stop-shop provider referral line, [facility], at [phone].    If you feel you have received this communication in error or would no longer like to receive these types of communications, please e-mail [email or web address]

## 2019-07-01 NOTE — PROGRESS NOTES
Referring Provider:    Elena Finley Md  [address] [place] [address]  Subjective:   Patient: Juan Bateman [id number], :1970   Visit date:2019 9:12 AM    Chief Complaint:  Ear Fullness (left ear clogged)    HPI:  Juan is a 49 y.o. male who I was asked to see in consultation for evaluation of the following issue(s):    Patient first presented to clinic on 19 for R ear pustules with drainage. He was seen in  on 19 for this and started on Bactrim. He reported onset of flu like symptoms last Thursday and since, has had R otorrhea x 3 days. Otorrhea is brown/yellow. C/o severe otalgia AD with muffled hearing. He denied fevers or cough. He has a significant hx of allergies for which he has taken Allegra in the past prn for without relief. C/o facial congestion. No relief with Bactrim. No other relieving factors.     Review of Systems:  Negative unless checked off.  Gen:  []fever   []fatigue  HENT:  []nosebleeds  []dental problem   Eyes:  []photophobia  []visual disturbance  Resp:  []chest tightness []wheezing  Card:  []chest pain  []leg swelling  GI:  []abdominal pain []blood in stool  :  []dysuria  []hematuria  Musc:  []joint swelling  []gait problem  Skin:  []color change  []pallor  Neuro:  []seizures  []numbness  Hem:  []bruise/bleed easily  Psych:  []hallucinations  []behavioral problems  Allergy/Imm: has No Known Allergies.    His meds, allergies, medical, surgical, social & family histories were reviewed & updated:  -     He has a current medication list which includes the following prescription(s): alprazolam, amlodipine, aspirin, atorvastatin, cetirizine, citalopram, cyclobenzaprine, finasteride, fluticasone propionate, losartan-hydrochlorothiazide 100-25 mg, metoprolol succinate, naproxen, omeprazole, oseltamivir, promethazine-dextromethorphan, sildenafil, amoxicillin-clavulanate 875-125mg, fluticasone propionate, levocetirizine, methylprednisolone, and  "ofloxacin.  -     He  has a past medical history of Erectile dysfunction, HTN (hypertension), Hyperlipidemia, SHAW (obstructive sleep apnea), and PONV (postoperative nausea and vomiting).   -     He does not have any pertinent problems on file.   -     He  has a past surgical history that includes Knee surgery; eye surgery; and Colonoscopy (N/A, 3/15/2019).  -     He  reports that he has never smoked. He has never used smokeless tobacco. He reports that he drinks alcohol. He reports that he does not use drugs.  -     His family history includes Diabetes in his father and mother; Heart disease in his mother; Hodgkin's lymphoma in his mother.  -     He has No Known Allergies.    Objective:     Physical Exam:  Vitals:  BP (!) 140/92   Pulse 83   Temp 98 °F (36.7 °C)   Ht 6' 1" (1.854 m)   Wt 115.5 kg (254 lb 10.1 oz)   BMI 33.59 kg/m²   General appearance:  Well developed, well nourished    Eyes:  Extraocular motions intact, PERRL    Communication:  no hoarseness, no dysphonia    Ears: R TM is with bright erythema, dark/opaque, mild bulge,EAC is with yellow/thick drainage, no papule. L TM and EAC WNLL   Nose:  No masses/lesions of external nose, nasal mucosa, septum, and turbinates were within normal limits.  Mouth:  No mass/lesion of lips, teeth, gums, hard/soft palate, tongue, tonsils, or oropharynx.    Cardiovascular:  No pedal edema; Radial Pulses +2     Neck & Lymphatics:  No cervical lymphadenopathy, no neck mass/crepitus/ asymmetry, trachea is midline, no thyroid enlargement/tenderness/mass.    Psych: Oriented x3,  Alert with normal mood and affect.     Respiration/Chest:  Symmetric expansion during respiration, normal respiratory effort.    Skin:  Warm and intact. No ulcerations of face, scalp, neck.    Assessment & Plan:   Juan was seen today for ear fullness.    Diagnoses and all orders for this visit:    Otorrhea of right ear  -     Aerobic culture  -     Fungus culture    Acute otitis externa of " right ear, unspecified type    Right otitis media, unspecified otitis media type    Other orders  -     ofloxacin (OCUFLOX) 0.3 % ophthalmic solution; Apply 10 drops into ear once daily for 7 days.  -     amoxicillin-clavulanate 875-125mg (AUGMENTIN) 875-125 mg per tablet; Take 1 tablet by mouth every 12 (twelve) hours. for 10 days  -     fluticasone propionate (FLONASE) 50 mcg/actuation nasal spray; 2 sprays (100 mcg total) by Each Nare route 2 (two) times daily.  -     methylPREDNISolone (MEDROL DOSEPACK) 4 mg tablet; use as directed  -     levocetirizine (XYZAL) 5 MG tablet; Take 1 tablet (5 mg total) by mouth every evening.    Cultured R otorrhea today (will see if this grows despite 2 days of bactrim). Cleaned R EAC after culture obtained. Will call pt if culture changes treatment prior to his f/u visit.   D/C Bactrim, start Augmentin x 10 days  Ofloxacin AD  Medrol  Trial with Xyzal QHS, d/c Allegra  Flonase BID  Recheck in 1 week.     We discussed his medical conditions, treatments and plan.  Juan should return to clinic if any issues arise (symptoms worsen or persist), otherwise we will see him back in the clinic In 1 week.    Over 25 minutes were spent in face to face contact with the patient with greater than 50% spent discussing their diagnosis and coordination of care.     Thank you for allowing me to participate in the care of Juan.      Summer Boggs PA-C  [facility]   [facility]  [address]  [place] [address]  P: [phone]  F: [phone]      Patient: Juan Bateman [id number], :1970  Procedure date:2019  Patient's medications, allergies, past medical, surgical, social and family histories were reviewed and updated as appropriate.  Chief Complaint:  Ear Fullness (left ear clogged)    HPI:  Juan is a 49 y.o. male with the history of present illness as discussed in the clinic note from today.  The otoscopic  examination of the tympanic membrane was not possible due to copious infectious debris impaction.      Procedure: The patient was in agreement with the examination and debridement of the ears. Removal of the infectious debrisrequired a high level of expertise and use of an operating microscope and multiple micro-instruments.     With the patient in the supine position, we used the operating microscope to examine both ears with the appropriate sized ear speculum.  A variety of sterile, micro-instruments were utilized to remove the infectious debris.  I performed the procedure which required a significant amount of time and effort. The tympanic membrane was then well visualized.  The patient tolerated the procedure well and there were no complications.    Findings:   Right ear had significant debris, the EAC was erythematous, swollen and yellow/thick drainage, and the tympanic membrane with diffuse erythema and very opaque.     Left ear had no debris, the EAC was normal, and the tympanic membrane was intact with no evidence of middle ear fluid.

## 2019-07-03 NOTE — PROGRESS NOTES
Subjective:   Patient: Juan Bateman [id number], :1970   Visit date:2019 12:29 PM    Chief Complaint:  Follow-up    HPI:  Juan is a 49 y.o. male who is here for follow-up. He was last here 19 for R AOM/OE tx with Augmentin, Ofloxacin drops, Medrol, Xyzal, and Flonase. He rtc on 19 and reported relief in otorrhea but still has significant otalgia AD. Stated he received the most relief with the Medrol, which he has completed. He is currently on Augmentin, has 2 more days. No other new cold ssx. Continued muffled hearing. No other relieving factors.       Review of Systems:  -     Allergic/Immunologic: has No Known Allergies..  -     Constitutional: Current temp: 98.5 °F (36.9 °C) (Tympanic)    His meds, allergies, medical, surgical, social & family histories were reviewed & updated:  -     He has a current medication list which includes the following prescription(s): alprazolam, amlodipine, aspirin, atorvastatin, cetirizine, citalopram, cyclobenzaprine, finasteride, fluticasone propionate, fluticasone propionate, levocetirizine, losartan-hydrochlorothiazide 100-25 mg, metoprolol succinate, naproxen, ofloxacin, omeprazole, sildenafil, clindamycin, and prednisone.  -     He  has a past medical history of Erectile dysfunction, HTN (hypertension), Hyperlipidemia, SHAW (obstructive sleep apnea), and PONV (postoperative nausea and vomiting).   -     He does not have any pertinent problems on file.   -     He  has a past surgical history that includes Knee surgery; eye surgery; and Colonoscopy (N/A, 3/15/2019).  -     He  reports that he has never smoked. He has never used smokeless tobacco. He reports that he drinks alcohol. He reports that he does not use drugs.  -     His family history includes Diabetes in his father and mother; Heart disease in his mother; Hodgkin's lymphoma in his mother.  -     He has No Known Allergies.    Objective:     Physical Exam:  Vitals:  /87   Pulse  "76   Temp 98.5 °F (36.9 °C) (Tympanic)   Ht 6' 1" (1.854 m)   Wt 114.9 kg (253 lb 4.9 oz)   BMI 33.42 kg/m²   Appearance:  Well-developed, well-nourished.  Communication:  Able to communicate, no hoarseness.  Head & Face:  Normocephalic, atraumatic, no sinus tenderness, normal facial strength.  Eyes:  Extraocular motions intact.  Ears:  R TM is with bright erythema, significant bulge, intact, EAC WNL. L TM and EAC are WNL.   Nose:  No masses/lesions of external nose, nasal mucosa, septum, and turbinates were within normal limits.  Mouth:  No mass/lesion of lips, teeth, gums, hard/soft palate, tongue, tonsils, or oropharynx.  Neck & Lymphatics:  No cervical lymphadenopathy, no neck mass/crepitus/ asymmetry, trachea is midline, no thyroid enlargement/tenderness/mass.  Neuro/Psych: Alert with normal mood and affect.   Abdominal: Normal appearance.   Respiration/Chest:  Symmetric expansion during respiration, normal respiratory effort.  Skin:  Warm and intact  Cardiovascular:  No peripheral vascular edema or varicosities.    Assessment & Plan:   Juan was seen today for follow-up.    Diagnoses and all orders for this visit:    Right otitis media, unspecified otitis media type    Other orders  -     clindamycin (CLEOCIN) 300 MG capsule; Take 1 capsule (300 mg total) by mouth every 8 (eight) hours. for 10 days  -     predniSONE (DELTASONE) 20 MG tablet; Take 3 tab in am x 3 days, then 2 tab in am x 3 days, then 1 tab in am x 3 days, then 1/2 tab in am x 3 days    D/c Augmentin, start Clindamycin x 10 days  Prednisone taper  Continue Xyzal and Flonase  Recheck in 1 week    Over 25 minutes were spent in face to face contact with the patient with greater than 50% spent discussing their diagnosis and coordination of care.         Summer Boggs PA-C  [facility]   [facility]  [address]  [place] [address]  P: [phone]  F: [phone]      "

## 2019-07-05 LAB — BACTERIA SPEC AEROBE CULT: NORMAL

## 2019-07-08 ENCOUNTER — OFFICE VISIT (OUTPATIENT)
Dept: OTOLARYNGOLOGY | Facility: CLINIC | Age: 49
End: 2019-07-08
Payer: OTHER GOVERNMENT

## 2019-07-08 VITALS
HEIGHT: 73 IN | DIASTOLIC BLOOD PRESSURE: 87 MMHG | BODY MASS INDEX: 33.57 KG/M2 | WEIGHT: 253.31 LBS | TEMPERATURE: 99 F | HEART RATE: 76 BPM | SYSTOLIC BLOOD PRESSURE: 134 MMHG

## 2019-07-08 DIAGNOSIS — H66.91 RIGHT OTITIS MEDIA, UNSPECIFIED OTITIS MEDIA TYPE: Primary | ICD-10-CM

## 2019-07-08 PROCEDURE — 99214 PR OFFICE/OUTPT VISIT, EST, LEVL IV, 30-39 MIN: ICD-10-PCS | Mod: S$PBB,,, | Performed by: PHYSICIAN ASSISTANT

## 2019-07-08 PROCEDURE — 99999 PR PBB SHADOW E&M-EST. PATIENT-LVL IV: ICD-10-PCS | Mod: PBBFAC,,, | Performed by: PHYSICIAN ASSISTANT

## 2019-07-08 PROCEDURE — 99214 OFFICE O/P EST MOD 30 MIN: CPT | Mod: S$PBB,,, | Performed by: PHYSICIAN ASSISTANT

## 2019-07-08 PROCEDURE — 99214 OFFICE O/P EST MOD 30 MIN: CPT | Mod: PBBFAC | Performed by: PHYSICIAN ASSISTANT

## 2019-07-08 PROCEDURE — 99999 PR PBB SHADOW E&M-EST. PATIENT-LVL IV: CPT | Mod: PBBFAC,,, | Performed by: PHYSICIAN ASSISTANT

## 2019-07-08 RX ORDER — CLINDAMYCIN HYDROCHLORIDE 300 MG/1
300 CAPSULE ORAL EVERY 8 HOURS
Qty: 30 CAPSULE | Refills: 0 | Status: SHIPPED | OUTPATIENT
Start: 2019-07-08 | End: 2019-07-18

## 2019-07-08 RX ORDER — PREDNISONE 20 MG/1
TABLET ORAL
Qty: 21 TABLET | Refills: 0 | Status: SHIPPED | OUTPATIENT
Start: 2019-07-08 | End: 2019-09-11

## 2019-07-08 NOTE — PROGRESS NOTES
Subjective:   Patient: Juan Bateman [id number], :1970   Visit date:7/15/2019 12:29 PM    Chief Complaint:  Other (follow up)    HPI:  Juan is a 49 y.o. male who is here for follow-up. He presented to clinic on 19 for R AOM/OE tx with Augmentin, Ofloxacin drops, Medrol, Xyzal, and Flonase. He rtc on 19 and reported relief in otorrhea but still has significant otalgia AD. Stated he received the most relief with the Medrol, which he has completed. He is currently on Augmentin, has 2 more days. No other new cold ssx. Continued muffled hearing. R OM still present and pt was tx with Clindamycin and a prednisone taper. He rtc on 07/15/19 and denied any otalgia but continued to have pressure/ muffled hearing/ and feeling like he under water in his right ear. No other new ssx.       Review of Systems:  -     Allergic/Immunologic: has No Known Allergies..  -     Constitutional: Current temp: 97.9 °F (36.6 °C) (Tympanic)    His meds, allergies, medical, surgical, social & family histories were reviewed & updated:  -     He has a current medication list which includes the following prescription(s): alprazolam, amlodipine, aspirin, atorvastatin, cetirizine, citalopram, clindamycin, cyclobenzaprine, finasteride, fluticasone propionate, fluticasone propionate, levocetirizine, losartan-hydrochlorothiazide 100-25 mg, metoprolol succinate, naproxen, ofloxacin, omeprazole, prednisone, sildenafil, latanoprost, and meloxicam.  -     He  has a past medical history of Erectile dysfunction, HTN (hypertension), Hyperlipidemia, SHAW (obstructive sleep apnea), and PONV (postoperative nausea and vomiting).   -     He does not have any pertinent problems on file.   -     He  has a past surgical history that includes Knee surgery; eye surgery; and Colonoscopy (N/A, 3/15/2019).  -     He  reports that he has never smoked. He has never used smokeless tobacco. He reports that he drinks alcohol. He reports that he  does not use drugs.  -     His family history includes Diabetes in his father and mother; Heart disease in his mother; Hodgkin's lymphoma in his mother.  -     He has No Known Allergies.    Objective:     Physical Exam:  Vitals:  Temp 97.9 °F (36.6 °C) (Tympanic)   Ht 6' (1.829 m)   Wt 116.3 kg (256 lb 6.3 oz)   BMI 34.77 kg/m²   Appearance:  Well-developed, well-nourished.  Communication:  Able to communicate, no hoarseness.  Head & Face:  Normocephalic, atraumatic, no sinus tenderness, normal facial strength.  Eyes:  Extraocular motions intact.  Ears:  R TM is clear, moderate bulge, intact. R EAC is WNL. L TM and EAC WNL.   Nose:  No masses/lesions of external nose, nasal mucosa, septum, and turbinates were within normal limits.  Mouth:  No mass/lesion of lips, teeth, gums, hard/soft palate, tongue, tonsils, or oropharynx.  Neck & Lymphatics:  No cervical lymphadenopathy, no neck mass/crepitus/ asymmetry, trachea is midline, no thyroid enlargement/tenderness/mass.  Neuro/Psych: Alert with normal mood and affect.   Abdominal: Normal appearance.   Respiration/Chest:  Symmetric expansion during respiration, normal respiratory effort.  Skin:  Warm and intact  Cardiovascular:  No peripheral vascular edema or varicosities.    Assessment & Plan:   Juan was seen today for other.    Diagnoses and all orders for this visit:    Right otitis media, unspecified otitis media type    Dysfunction of right eustachian tube     No infection.   Will obtain audiogram and contact pt with results.   Discussed the possible need for a PET in clinic w/ MD.          Summer Boggs PA-C  [facility]   [facility]  [address]  [place] [address]  P: [phone]  F: [phone]

## 2019-07-15 ENCOUNTER — CLINICAL SUPPORT (OUTPATIENT)
Dept: AUDIOLOGY | Facility: CLINIC | Age: 49
End: 2019-07-15
Payer: OTHER GOVERNMENT

## 2019-07-15 ENCOUNTER — OFFICE VISIT (OUTPATIENT)
Dept: OTOLARYNGOLOGY | Facility: CLINIC | Age: 49
End: 2019-07-15
Payer: OTHER GOVERNMENT

## 2019-07-15 VITALS — TEMPERATURE: 98 F | WEIGHT: 256.38 LBS | HEIGHT: 72 IN | BODY MASS INDEX: 34.72 KG/M2

## 2019-07-15 DIAGNOSIS — H66.91 RIGHT OTITIS MEDIA, UNSPECIFIED OTITIS MEDIA TYPE: Primary | ICD-10-CM

## 2019-07-15 DIAGNOSIS — H90.41 SENSORINEURAL HEARING LOSS (SNHL) OF RIGHT EAR WITH UNRESTRICTED HEARING OF LEFT EAR: Primary | ICD-10-CM

## 2019-07-15 DIAGNOSIS — H69.91 DYSFUNCTION OF RIGHT EUSTACHIAN TUBE: ICD-10-CM

## 2019-07-15 PROCEDURE — 99213 OFFICE O/P EST LOW 20 MIN: CPT | Mod: PBBFAC | Performed by: PHYSICIAN ASSISTANT

## 2019-07-15 PROCEDURE — 99213 PR OFFICE/OUTPT VISIT, EST, LEVL III, 20-29 MIN: ICD-10-PCS | Mod: S$PBB,,, | Performed by: PHYSICIAN ASSISTANT

## 2019-07-15 PROCEDURE — 99213 OFFICE O/P EST LOW 20 MIN: CPT | Mod: S$PBB,,, | Performed by: PHYSICIAN ASSISTANT

## 2019-07-15 PROCEDURE — 99999 PR PBB SHADOW E&M-EST. PATIENT-LVL III: ICD-10-PCS | Mod: PBBFAC,,, | Performed by: PHYSICIAN ASSISTANT

## 2019-07-15 PROCEDURE — 92557 COMPREHENSIVE HEARING TEST: CPT | Mod: PBBFAC | Performed by: AUDIOLOGIST-HEARING AID FITTER

## 2019-07-15 PROCEDURE — 92567 TYMPANOMETRY: CPT | Mod: PBBFAC | Performed by: AUDIOLOGIST-HEARING AID FITTER

## 2019-07-15 PROCEDURE — 99999 PR PBB SHADOW E&M-EST. PATIENT-LVL III: CPT | Mod: PBBFAC,,, | Performed by: PHYSICIAN ASSISTANT

## 2019-07-15 RX ORDER — MELOXICAM 15 MG/1
15 TABLET ORAL DAILY
Refills: 11 | COMMUNITY
Start: 2019-07-03 | End: 2019-11-19 | Stop reason: SDUPTHER

## 2019-07-15 RX ORDER — LATANOPROST 50 UG/ML
SOLUTION/ DROPS OPHTHALMIC
COMMUNITY
Start: 2019-07-10

## 2019-07-15 NOTE — PROGRESS NOTES
Referring Provider: WOODY Cleaningmarguerite Bateman was seen 07/15/2019 for an audiological evaluation.  Patient complains of hearing loss that began 3 weeks ago.  He reportedly had an ear infection of the right ear and has seen ENT for treatment.  He was seen in urgent care prior to that for what was thought to be the flu.  Patient reports fullness of the right ear, denies tinnitus, and otalgia today.  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  He denies any other recreational noise hazards recently.    Results reveal a mild sensorineural hearing loss 250-8000 Hz for the right ear, and normal hearing from 250-8000 Hz for the left ear.   Speech Reception Thresholds were  25 dBHL for the right ear and 15 dBHL for the left ear.   Word recognition scores were excellent for the right ear and excellent for the left ear.   Tympanograms were Type As, shallow for the right ear and Type A, normal for the left ear.    Patient was counseled on the above findings.    Recommendations:  1. ENT  2. Audiograms as needed.

## 2019-07-19 ENCOUNTER — TELEPHONE (OUTPATIENT)
Dept: OTOLARYNGOLOGY | Facility: CLINIC | Age: 49
End: 2019-07-19

## 2019-07-19 NOTE — TELEPHONE ENCOUNTER
Pt didn't answer left a detailed message informing that he needs to have blood work prior to his MRI but he couldn't get it on the day of the apt because the lab would not be open.        ----- Message from Chelsea Bermudez sent at 7/19/2019  2:33 PM CDT -----  Good Afternoon!    This patient is scheduled to have a radiology exam. We will need a recent creatinine for this patient (within the last 30 days). Please place STAT order and schedule patient 1 hour prior to radiology appointment. If you have any questions please contact Radiology at [phone].    Thank You,    Chelsea YU  Radiology Dept

## 2019-07-22 ENCOUNTER — HOSPITAL ENCOUNTER (OUTPATIENT)
Dept: RADIOLOGY | Facility: HOSPITAL | Age: 49
Discharge: HOME | End: 2019-07-22
Attending: PHYSICIAN ASSISTANT
Payer: OTHER GOVERNMENT

## 2019-07-22 ENCOUNTER — LAB VISIT (OUTPATIENT)
Dept: LAB | Facility: HOSPITAL | Age: 49
End: 2019-07-22
Payer: OTHER GOVERNMENT

## 2019-07-22 LAB
CREAT SERPL-MCNC: 0.8 MG/DL (ref 0.5–1.4)
EST. GFR  (AFRICAN AMERICAN): >60 ML/MIN/1.73 M^2
EST. GFR  (NON AFRICAN AMERICAN): >60 ML/MIN/1.73 M^2

## 2019-07-22 PROCEDURE — 70553 MRI BRAIN STEM W/O & W/DYE: CPT | Mod: TC,PO

## 2019-07-22 PROCEDURE — 36415 COLL VENOUS BLD VENIPUNCTURE: CPT

## 2019-07-22 PROCEDURE — 70553 MRI IAC/TEMPORAL BONES W W/O CONTRAST: ICD-10-PCS | Mod: 26,,, | Performed by: RADIOLOGY

## 2019-07-22 PROCEDURE — 70553 MRI BRAIN STEM W/O & W/DYE: CPT | Mod: 26,,, | Performed by: RADIOLOGY

## 2019-07-22 PROCEDURE — 82565 ASSAY OF CREATININE: CPT

## 2019-07-22 RX ORDER — GADOBUTROL 604.72 MG/ML
10 INJECTION INTRAVENOUS
Status: COMPLETED | OUTPATIENT
Start: 2019-07-22 | End: 2019-07-22

## 2019-07-22 RX ADMIN — GADOBUTROL 10 ML: 604.72 INJECTION INTRAVENOUS at 10:07

## 2019-07-23 ENCOUNTER — PATIENT MESSAGE (OUTPATIENT)
Dept: OTOLARYNGOLOGY | Facility: CLINIC | Age: 49
End: 2019-07-23

## 2019-07-23 ENCOUNTER — TELEPHONE (OUTPATIENT)
Dept: OTOLARYNGOLOGY | Facility: CLINIC | Age: 49
End: 2019-07-23

## 2019-07-23 NOTE — TELEPHONE ENCOUNTER
Called pt and scheduled to have a PET placement with Dr Reyes in clinic. Pt verbalized understanding.

## 2019-08-02 ENCOUNTER — PROCEDURE VISIT (OUTPATIENT)
Dept: OTOLARYNGOLOGY | Facility: CLINIC | Age: 49
End: 2019-08-02
Payer: OTHER GOVERNMENT

## 2019-08-02 VITALS
TEMPERATURE: 98 F | WEIGHT: 255.31 LBS | SYSTOLIC BLOOD PRESSURE: 143 MMHG | HEIGHT: 72 IN | DIASTOLIC BLOOD PRESSURE: 92 MMHG | HEART RATE: 91 BPM | BODY MASS INDEX: 34.58 KG/M2

## 2019-08-02 DIAGNOSIS — H69.91 DYSFUNCTION OF RIGHT EUSTACHIAN TUBE: ICD-10-CM

## 2019-08-02 DIAGNOSIS — G47.33 OSA ON CPAP: Primary | Chronic | ICD-10-CM

## 2019-08-02 PROCEDURE — 99214 PR OFFICE/OUTPT VISIT, EST, LEVL IV, 30-39 MIN: ICD-10-PCS | Mod: S$PBB,,, | Performed by: OTOLARYNGOLOGY

## 2019-08-02 PROCEDURE — 99214 OFFICE O/P EST MOD 30 MIN: CPT | Mod: S$PBB,,, | Performed by: OTOLARYNGOLOGY

## 2019-08-02 NOTE — PROCEDURES
Procedures     Subjective:   Patient: Juan Bateman [id number], :1970   Visit date:2019 3:25 PM    Chief Complaint:  PET Placement    HPI:  Juan is a 49 y.o. male who is here for follow-up.  Over the past week or so, he has experienced significant improvement in the pressure and discomfort of the right year.  He reports that his hearing seems to be returning to normal. The also has significant obstructive sleep apnea.  He is treated by pulmonology with CPAP.  He reports significant difficulty tolerating CPAP and would like to discuss surgical options.        Review of Systems:  -     Allergic/Immunologic: has No Known Allergies..  -     Constitutional: Current temp: 98.2 °F (36.8 °C)    His meds, allergies, medical, surgical, social & family histories were reviewed & updated:  -     He has a current medication list which includes the following prescription(s): alprazolam, amlodipine, aspirin, atorvastatin, citalopram, cyclobenzaprine, finasteride, fluticasone propionate, latanoprost, levocetirizine, losartan-hydrochlorothiazide 100-25 mg, meloxicam, metoprolol succinate, naproxen, ofloxacin, omeprazole, prednisone, sildenafil, and cetirizine.  -     He  has a past medical history of Erectile dysfunction, HTN (hypertension), Hyperlipidemia, SHAW (obstructive sleep apnea), and PONV (postoperative nausea and vomiting).   -     He does not have any pertinent problems on file.   -     He  has a past surgical history that includes Knee surgery; eye surgery; and Colonoscopy (N/A, 3/15/2019).  -     He  reports that he has never smoked. He has never used smokeless tobacco. He reports that he drinks alcohol. He reports that he does not use drugs.  -     His family history includes Diabetes in his father and mother; Heart disease in his mother; Hodgkin's lymphoma in his mother.  -     He has No Known Allergies.    Objective:     Physical Exam:  Vitals:  BP (!) 143/92   Pulse 91   Temp 98.2 °F (36.8  °C)   Ht 6' (1.829 m)   Wt 115.8 kg (255 lb 4.7 oz)   BMI 34.62 kg/m²   Appearance:  Well-developed, well-nourished.  Communication:  Able to communicate, no hoarseness.  Head & Face:  Normocephalic, atraumatic, no sinus tenderness, normal facial strength.  Eyes:  Extraocular motions intact.  Ears:  Otoscopy of external auditory canals and tympanic membranes was normal, clinical speech reception thresholds grossly intact, no mass/lesion of auricle.  Nose:  No masses/lesions of external nose, nasal mucosa, septum, and turbinates were within normal limits.  Neck & Lymphatics:  No cervical lymphadenopathy, no neck mass/crepitus/ asymmetry, trachea is midline, no thyroid enlargement/tenderness/mass.  Neuro/Psych: Alert with normal mood and affect.   Abdominal: Normal appearance.   Respiration/Chest:  Symmetric expansion during respiration, normal respiratory effort.  Skin:  Warm and intact  Cardiovascular:  No peripheral vascular edema or varicosities.  Mouth:  The lips, teeth, gums, hard/soft palate, tongue, tonsils, and oropharynx were examined and revealed no masses or lesions.  Jauregui palate position = 3. Tonsil size = 3+ with normal appearance    Assessment & Plan:   Juan was seen today for pet placement.    Diagnoses and all orders for this visit:    SHAW on CPAP    Dysfunction of right eustachian tube          SHAW- Fujita Class 2; Freidman Stage 2; UP3 alone surgical success 38%; Combined UP3 and midline glossectomy is closer to 75% success rate; however would have to perform endoscopy to further determine nature and location of obstruction. I advised him to work with the sleep clinic on other masks that may be more tolerable and if he would like to discuss further I am happy to do so.     ETD- continue observation    Over 25 minutes were spent in face to face contact with the patient with greater than 50% spent discussing their diagnosis and coordination of care.

## 2019-08-06 LAB — FUNGUS SPEC CULT: NORMAL

## 2019-08-15 ENCOUNTER — TELEPHONE (OUTPATIENT)
Dept: ADMINISTRATIVE | Facility: HOSPITAL | Age: 49
End: 2019-08-15

## 2019-08-15 NOTE — TELEPHONE ENCOUNTER
Contacted patient to schedule follow up with PCP. Left voicemail message for patient to call back and schedule visit to follow up on their hypertension. Misael

## 2019-08-27 RX ORDER — CITALOPRAM 20 MG/1
TABLET, FILM COATED ORAL
Qty: 90 TABLET | Refills: 4 | Status: SHIPPED | OUTPATIENT
Start: 2019-08-27 | End: 2020-11-20

## 2019-09-03 DIAGNOSIS — I10 ESSENTIAL HYPERTENSION: ICD-10-CM

## 2019-09-03 RX ORDER — AMLODIPINE BESYLATE 5 MG/1
5 TABLET ORAL DAILY
Qty: 90 TABLET | Refills: 3 | Status: SHIPPED | OUTPATIENT
Start: 2019-09-03 | End: 2020-09-08

## 2019-09-03 RX ORDER — METOPROLOL SUCCINATE 50 MG/1
50 TABLET, EXTENDED RELEASE ORAL DAILY
Qty: 90 TABLET | Refills: 3 | Status: SHIPPED | OUTPATIENT
Start: 2019-09-03 | End: 2020-09-08

## 2019-09-11 ENCOUNTER — OFFICE VISIT (OUTPATIENT)
Dept: INTERNAL MEDICINE | Facility: CLINIC | Age: 49
End: 2019-09-11
Payer: OTHER GOVERNMENT

## 2019-09-11 VITALS
HEIGHT: 72 IN | HEART RATE: 99 BPM | SYSTOLIC BLOOD PRESSURE: 130 MMHG | BODY MASS INDEX: 34.82 KG/M2 | WEIGHT: 257.06 LBS | DIASTOLIC BLOOD PRESSURE: 70 MMHG | TEMPERATURE: 101 F

## 2019-09-11 DIAGNOSIS — R50.81 FEVER IN OTHER DISEASES: Primary | ICD-10-CM

## 2019-09-11 DIAGNOSIS — R19.7 DIARRHEA, UNSPECIFIED TYPE: ICD-10-CM

## 2019-09-11 LAB
CTP QC/QA: YES
POC MOLECULAR INFLUENZA A AGN: NEGATIVE
POC MOLECULAR INFLUENZA B AGN: NEGATIVE

## 2019-09-11 PROCEDURE — 99999 PR PBB SHADOW E&M-EST. PATIENT-LVL IV: CPT | Mod: PBBFAC,,, | Performed by: PHYSICIAN ASSISTANT

## 2019-09-11 PROCEDURE — 99213 PR OFFICE/OUTPT VISIT, EST, LEVL III, 20-29 MIN: ICD-10-PCS | Mod: S$PBB,,, | Performed by: PHYSICIAN ASSISTANT

## 2019-09-11 PROCEDURE — 99213 OFFICE O/P EST LOW 20 MIN: CPT | Mod: S$PBB,,, | Performed by: PHYSICIAN ASSISTANT

## 2019-09-11 PROCEDURE — 87502 INFLUENZA DNA AMP PROBE: CPT | Mod: PBBFAC,PO | Performed by: PHYSICIAN ASSISTANT

## 2019-09-11 PROCEDURE — 99214 OFFICE O/P EST MOD 30 MIN: CPT | Mod: PBBFAC,PO | Performed by: PHYSICIAN ASSISTANT

## 2019-09-11 PROCEDURE — 99999 PR PBB SHADOW E&M-EST. PATIENT-LVL IV: ICD-10-PCS | Mod: PBBFAC,,, | Performed by: PHYSICIAN ASSISTANT

## 2019-09-11 RX ORDER — AMOXICILLIN AND CLAVULANATE POTASSIUM 500; 125 MG/1; MG/1
1 TABLET, FILM COATED ORAL 2 TIMES DAILY
Qty: 14 TABLET | Refills: 0 | Status: SHIPPED | OUTPATIENT
Start: 2019-09-11 | End: 2019-09-11

## 2019-09-11 NOTE — PROGRESS NOTES
Subjective:       Patient ID: Juan Bateman is a 49 y.o. male.    Chief Complaint: Fever    Fever    This is a new problem. The current episode started yesterday. The problem occurs constantly. The problem has been unchanged. The maximum temperature noted was 101 to 101.9 F. The temperature was taken using an oral thermometer. Associated symptoms include diarrhea, muscle aches and nausea. Pertinent negatives include no abdominal pain, chest pain, congestion, coughing, ear pain, headaches, rash, sleepiness, sore throat, urinary pain, vomiting or wheezing. He has tried NSAIDs for the symptoms. The treatment provided mild relief.   Risk factors: no contaminated food, no contaminated water, no hx of cancer, no immunosuppression, no occupational exposure, no recent sickness, no recent travel and no sick contacts        There are no preventive care reminders to display for this patient.    Past Medical History:   Diagnosis Date    Erectile dysfunction     HTN (hypertension)     Hyperlipidemia     SHAW (obstructive sleep apnea)     PONV (postoperative nausea and vomiting)        Current Outpatient Medications   Medication Sig Dispense Refill    ALPRAZolam (XANAX) 0.25 MG tablet Take 1 tablet (0.25 mg total) by mouth 2 (two) times daily as needed for Anxiety. 30 tablet 0    amLODIPine (NORVASC) 5 MG tablet Take 1 tablet (5 mg total) by mouth once daily. 90 tablet 3    aspirin 81 mg Tab Take 1 tablet by mouth Daily.      atorvastatin (LIPITOR) 20 MG tablet TAKE 1 TABLET DAILY 90 tablet 3    citalopram (CELEXA) 20 MG tablet TAKE 1 TABLET DAILY 90 tablet 4    finasteride (PROPECIA) 1 mg tablet TAKE ONE TABLET BY MOUTH ONCE DAILY 90 tablet 3    fluticasone propionate (FLONASE) 50 mcg/actuation nasal spray 2 sprays (100 mcg total) by Each Nare route 2 (two) times daily. 9.9 mL 11    latanoprost 0.005 % ophthalmic solution       levocetirizine (XYZAL) 5 MG tablet Take 1 tablet (5 mg total) by mouth every  evening. 30 tablet 11    losartan-hydrochlorothiazide 100-25 mg (HYZAAR) 100-25 mg per tablet TAKE 1 TABLET DAILY 90 tablet 0    meloxicam (MOBIC) 15 MG tablet Take 15 mg by mouth once daily.  11    metoprolol succinate (TOPROL-XL) 50 MG 24 hr tablet Take 1 tablet (50 mg total) by mouth once daily. 90 tablet 3    naproxen (NAPROSYN) 500 MG tablet Take 1 tablet (500 mg total) by mouth 2 (two) times daily with meals. For pain and inflammation 30 tablet 0    ofloxacin (OCUFLOX) 0.3 % ophthalmic solution Apply 10 drops into ear once daily for 7 days. 1 Bottle 0    omeprazole (PRILOSEC) 20 MG capsule TAKE 1 CAPSULE DAILY 90 capsule 3    sildenafil (VIAGRA) 50 MG tablet TAKE 1 TO 2 TABLETS 20 MINUTES PRIOR TO SEXUAL ACTIVITY 30 tablet 9    cetirizine (ZYRTEC) 10 MG tablet Take 1 tablet (10 mg total) by mouth once daily. 30 tablet 0     No current facility-administered medications for this visit.        Review of Systems   Constitutional: Positive for fever.   HENT: Negative for congestion, ear pain and sore throat.    Respiratory: Negative for cough and wheezing.    Cardiovascular: Negative for chest pain.   Gastrointestinal: Positive for diarrhea and nausea. Negative for abdominal pain and vomiting.   Genitourinary: Negative for dysuria.   Skin: Negative for rash.   Neurological: Negative for headaches.       Objective:   /70   Pulse 99   Temp (!) 101.3 °F (38.5 °C)   Ht 6' (1.829 m)   Wt 116.6 kg (257 lb 0.9 oz)   BMI 34.86 kg/m²      Physical Exam   Constitutional: He is oriented to person, place, and time. He appears well-developed and well-nourished. No distress.   HENT:   Head: Normocephalic and atraumatic.   Right Ear: External ear normal.   Left Ear: External ear normal.   Eyes: Pupils are equal, round, and reactive to light. Conjunctivae and EOM are normal.   Neck: Normal range of motion. Neck supple.   Cardiovascular: Normal rate, regular rhythm, normal heart sounds and intact distal pulses.    Pulmonary/Chest: Effort normal and breath sounds normal.   Abdominal: Soft. Bowel sounds are normal. He exhibits no distension and no mass. There is no tenderness. There is no guarding.   Musculoskeletal: He exhibits no edema.   Neurological: He is alert and oriented to person, place, and time.   Skin: Capillary refill takes less than 2 seconds. He is diaphoretic.   Psychiatric: He has a normal mood and affect. His behavior is normal.         Lab Results   Component Value Date    WBC 3.76 (L) 01/07/2019    HGB 13.5 (L) 01/07/2019    HCT 42.5 01/07/2019     01/07/2019    CHOL 191 01/07/2019    TRIG 198 (H) 01/07/2019    HDL 43 01/07/2019    ALT 28 01/07/2019    AST 25 01/07/2019     01/07/2019    K 5.0 01/07/2019     01/07/2019    CREATININE 0.8 07/22/2019    BUN 16 01/07/2019    CO2 28 01/07/2019    TSH 2.887 01/07/2019    PSA 0.18 01/07/2019       Assessment:       1. Fever in other diseases    2. Diarrhea, unspecified type        Plan:   Fever in other diseases  -     POCT Influenza A/B Molecular    Diarrhea, unspecified type    Having fever and diarrhea in abscesse of abd pain   Could be viral , but also could be colitis   Suggest we start Augmentin - ciproflagyl combo interacts with celexa   Will check on Patient tomorrow   Possibly will order ct if no improvement   amoxicillin-clavulanate 500-125mg (AUGMENTIN) 500-125 mg Tab; Take 1 tablet (500 mg total) by mouth 2 (two) times daily. for 7 days  Dispense: 14 tablet; Refill: 0        No follow-ups on file.

## 2019-09-13 ENCOUNTER — PATIENT MESSAGE (OUTPATIENT)
Dept: INTERNAL MEDICINE | Facility: CLINIC | Age: 49
End: 2019-09-13

## 2019-09-13 ENCOUNTER — LAB VISIT (OUTPATIENT)
Dept: LAB | Facility: HOSPITAL | Age: 49
End: 2019-09-13
Attending: PHYSICIAN ASSISTANT
Payer: OTHER GOVERNMENT

## 2019-09-13 ENCOUNTER — TELEPHONE (OUTPATIENT)
Dept: INTERNAL MEDICINE | Facility: CLINIC | Age: 49
End: 2019-09-13

## 2019-09-13 DIAGNOSIS — K92.1 BLOODY STOOL: Primary | ICD-10-CM

## 2019-09-13 DIAGNOSIS — R19.7 DIARRHEA OF PRESUMED INFECTIOUS ORIGIN: ICD-10-CM

## 2019-09-13 DIAGNOSIS — K92.1 BLOODY STOOL: ICD-10-CM

## 2019-09-13 LAB
ALBUMIN SERPL BCP-MCNC: 3.5 G/DL (ref 3.5–5.2)
ALP SERPL-CCNC: 69 U/L (ref 55–135)
ALT SERPL W/O P-5'-P-CCNC: 50 U/L (ref 10–44)
ANION GAP SERPL CALC-SCNC: 9 MMOL/L (ref 8–16)
AST SERPL-CCNC: 44 U/L (ref 10–40)
BASOPHILS # BLD AUTO: 0.02 K/UL (ref 0–0.2)
BASOPHILS NFR BLD: 0.5 % (ref 0–1.9)
BILIRUB SERPL-MCNC: 0.6 MG/DL (ref 0.1–1)
BUN SERPL-MCNC: 8 MG/DL (ref 6–20)
CALCIUM SERPL-MCNC: 9.5 MG/DL (ref 8.7–10.5)
CHLORIDE SERPL-SCNC: 101 MMOL/L (ref 95–110)
CO2 SERPL-SCNC: 30 MMOL/L (ref 23–29)
CREAT SERPL-MCNC: 0.9 MG/DL (ref 0.5–1.4)
DIFFERENTIAL METHOD: ABNORMAL
EOSINOPHIL # BLD AUTO: 0 K/UL (ref 0–0.5)
EOSINOPHIL NFR BLD: 1 % (ref 0–8)
ERYTHROCYTE [DISTWIDTH] IN BLOOD BY AUTOMATED COUNT: 13.1 % (ref 11.5–14.5)
EST. GFR  (AFRICAN AMERICAN): >60 ML/MIN/1.73 M^2
EST. GFR  (NON AFRICAN AMERICAN): >60 ML/MIN/1.73 M^2
GLUCOSE SERPL-MCNC: 101 MG/DL (ref 70–110)
HCT VFR BLD AUTO: 39 % (ref 40–54)
HGB BLD-MCNC: 13.3 G/DL (ref 14–18)
IMM GRANULOCYTES # BLD AUTO: 0.01 K/UL (ref 0–0.04)
IMM GRANULOCYTES NFR BLD AUTO: 0.2 % (ref 0–0.5)
LYMPHOCYTES # BLD AUTO: 1.1 K/UL (ref 1–4.8)
LYMPHOCYTES NFR BLD: 26.3 % (ref 18–48)
MCH RBC QN AUTO: 29.6 PG (ref 27–31)
MCHC RBC AUTO-ENTMCNC: 34.1 G/DL (ref 32–36)
MCV RBC AUTO: 87 FL (ref 82–98)
MONOCYTES # BLD AUTO: 0.7 K/UL (ref 0.3–1)
MONOCYTES NFR BLD: 15.8 % (ref 4–15)
NEUTROPHILS # BLD AUTO: 2.3 K/UL (ref 1.8–7.7)
NEUTROPHILS NFR BLD: 56.4 % (ref 38–73)
NRBC BLD-RTO: 0 /100 WBC
PLATELET # BLD AUTO: 175 K/UL (ref 150–350)
PMV BLD AUTO: 10.1 FL (ref 9.2–12.9)
POTASSIUM SERPL-SCNC: 4.1 MMOL/L (ref 3.5–5.1)
PROT SERPL-MCNC: 6.9 G/DL (ref 6–8.4)
RBC # BLD AUTO: 4.49 M/UL (ref 4.6–6.2)
SODIUM SERPL-SCNC: 140 MMOL/L (ref 136–145)
WBC # BLD AUTO: 4.11 K/UL (ref 3.9–12.7)

## 2019-09-13 PROCEDURE — 36415 COLL VENOUS BLD VENIPUNCTURE: CPT

## 2019-09-13 PROCEDURE — 85025 COMPLETE CBC W/AUTO DIFF WBC: CPT

## 2019-09-13 PROCEDURE — 80053 COMPREHEN METABOLIC PANEL: CPT

## 2019-09-13 NOTE — TELEPHONE ENCOUNTER
----- Message from Ashiaechosatish Rojas sent at 9/13/2019  2:40 PM CDT -----  Contact: Pt  .Type:  Patient Returning Call    Who Called:Pt   Who Left Message for Patient: Nurse  Does the patient know what this is regarding?: results  Would the patient rather a call back or a response via [employer]? Call back   Best Call Back Number: .[phone] (home)   Additional Information:

## 2019-09-16 ENCOUNTER — PATIENT MESSAGE (OUTPATIENT)
Dept: INTERNAL MEDICINE | Facility: CLINIC | Age: 49
End: 2019-09-16

## 2019-09-17 ENCOUNTER — TELEPHONE (OUTPATIENT)
Dept: RADIOLOGY | Facility: HOSPITAL | Age: 49
End: 2019-09-17

## 2019-09-18 ENCOUNTER — TELEPHONE (OUTPATIENT)
Dept: INTERNAL MEDICINE | Facility: CLINIC | Age: 49
End: 2019-09-18

## 2019-09-18 DIAGNOSIS — K92.1 HEMATOCHEZIA: Primary | ICD-10-CM

## 2019-09-19 ENCOUNTER — LAB VISIT (OUTPATIENT)
Dept: LAB | Facility: HOSPITAL | Age: 49
End: 2019-09-19
Payer: OTHER GOVERNMENT

## 2019-09-19 DIAGNOSIS — K92.1 HEMATOCHEZIA: ICD-10-CM

## 2019-09-19 LAB — OB PNL STL: NEGATIVE

## 2019-09-19 PROCEDURE — 82272 OCCULT BLD FECES 1-3 TESTS: CPT

## 2019-09-20 ENCOUNTER — OFFICE VISIT (OUTPATIENT)
Dept: OTOLARYNGOLOGY | Facility: CLINIC | Age: 49
End: 2019-09-20
Payer: OTHER GOVERNMENT

## 2019-09-20 VITALS
DIASTOLIC BLOOD PRESSURE: 86 MMHG | SYSTOLIC BLOOD PRESSURE: 134 MMHG | HEART RATE: 72 BPM | WEIGHT: 260.81 LBS | TEMPERATURE: 98 F | BODY MASS INDEX: 35.37 KG/M2

## 2019-09-20 DIAGNOSIS — H69.91 DYSFUNCTION OF RIGHT EUSTACHIAN TUBE: Primary | ICD-10-CM

## 2019-09-20 PROCEDURE — 99213 PR OFFICE/OUTPT VISIT, EST, LEVL III, 20-29 MIN: ICD-10-PCS | Mod: 25,S$PBB,, | Performed by: OTOLARYNGOLOGY

## 2019-09-20 PROCEDURE — 99213 OFFICE O/P EST LOW 20 MIN: CPT | Mod: PBBFAC | Performed by: OTOLARYNGOLOGY

## 2019-09-20 PROCEDURE — 99213 OFFICE O/P EST LOW 20 MIN: CPT | Mod: 25,S$PBB,, | Performed by: OTOLARYNGOLOGY

## 2019-09-20 PROCEDURE — 99999 PR PBB SHADOW E&M-EST. PATIENT-LVL III: ICD-10-PCS | Mod: PBBFAC,,, | Performed by: OTOLARYNGOLOGY

## 2019-09-20 PROCEDURE — 99999 PR PBB SHADOW E&M-EST. PATIENT-LVL III: CPT | Mod: PBBFAC,,, | Performed by: OTOLARYNGOLOGY

## 2019-09-20 PROCEDURE — 92567 TYMPANOMETRY: CPT | Mod: ,,, | Performed by: OTOLARYNGOLOGY

## 2019-09-20 PROCEDURE — 92567 PR TYMPA2METRY: ICD-10-PCS | Mod: ,,, | Performed by: OTOLARYNGOLOGY

## 2019-09-20 RX ORDER — AMOXICILLIN AND CLAVULANATE POTASSIUM 500; 125 MG/1; MG/1
1 TABLET, FILM COATED ORAL 2 TIMES DAILY
Refills: 0 | COMMUNITY
Start: 2019-09-12 | End: 2019-09-20 | Stop reason: ALTCHOICE

## 2019-09-20 NOTE — PROGRESS NOTES
Subjective:   Patient: Juan Bateman [id number], :1970   Visit date:2019 12:13 PM    Chief Complaint:  Follow-up    HPI:  Juan is a 49 y.o. male who is here for follow-up.    Doing well.  Hearing normal.  He did have some pressure while flying but otherwise no complaints.         Review of Systems:  -     Allergic/Immunologic: has No Known Allergies..  -     Constitutional: Current temp: 97.6 °F (36.4 °C)    His meds, allergies, medical, surgical, social & family histories were reviewed & updated:  -     He has a current medication list which includes the following prescription(s): alprazolam, amlodipine, aspirin, atorvastatin, citalopram, finasteride, fluticasone propionate, latanoprost, levocetirizine, losartan-hydrochlorothiazide 100-25 mg, meloxicam, metoprolol succinate, naproxen, omeprazole, sildenafil, and cetirizine.  -     He  has a past medical history of Erectile dysfunction, HTN (hypertension), Hyperlipidemia, SHAW (obstructive sleep apnea), and PONV (postoperative nausea and vomiting).   -     He does not have any pertinent problems on file.   -     He  has a past surgical history that includes Knee surgery; eye surgery; and Colonoscopy (N/A, 3/15/2019).  -     He  reports that he has never smoked. He has never used smokeless tobacco. He reports that he drinks alcohol. He reports that he does not use drugs.  -     His family history includes Diabetes in his father and mother; Heart disease in his mother; Hodgkin's lymphoma in his mother.  -     He has No Known Allergies.    Objective:     Physical Exam:  Vitals:  /86   Pulse 72   Temp 97.6 °F (36.4 °C)   Wt 118.3 kg (260 lb 12.9 oz)   BMI 35.37 kg/m²   Appearance:  Well-developed, well-nourished.  Communication:  Able to communicate, no hoarseness.  Head & Face:  Normocephalic, atraumatic, no sinus tenderness, normal facial strength.  Eyes:  Extraocular motions intact.  Ears:  Otoscopy of external auditory canals and  tympanic membranes was normal, clinical speech reception thresholds grossly intact, no mass/lesion of auricle.  Nose:  No masses/lesions of external nose, nasal mucosa, septum, and turbinates were within normal limits.  Mouth:  No mass/lesion of lips, teeth, gums, hard/soft palate, tongue, tonsils, or oropharynx.  Neck & Lymphatics:  No cervical lymphadenopathy, no neck mass/crepitus/ asymmetry, trachea is midline, no thyroid enlargement/tenderness/mass.  Neuro/Psych: Alert with normal mood and affect.   Abdominal: Normal appearance.   Respiration/Chest:  Symmetric expansion during respiration, normal respiratory effort.  Skin:  Warm and intact  Cardiovascular:  No peripheral vascular edema or varicosities.          TYMPANOMETRY    Tympanometry revealed Type As in the right ear, and Type As in the left ear.     Right Ear: 0.3 ml  -3 daPa, with ear canal volume of:  1.2 mL  Left Ear:  0.4 ml  -9 daPa, with ear canal volume of:   1.2 mL    Middle ear pressure values ranging from +50 daPa to -200 daPa for children, and +50  daPa to -50 daPa for adults is generally considered normal.    Normative ear canal volumes vary as a function of age. Typically for children a volume  range of 0.5 to 1.5 cc is typically considered normal, while for adults the range is 0.5 to  2.00 cc.     Type A tympanograms: normal middle ear pressure;  Peak between +50 daPa to -200 daPa for children & +50 daPa to -50 daPa      Type C tympanograms:   abnormally low middle ear pressure;  Peak less than -200daPa for children/-50 daPa for adults      Type B tympanograms no pressure peak; Peak less than -200daPa for children/-50 daPa for adults      Patient was counseled with results.      Assessment & Plan:   Juan was seen today for follow-up.    Diagnoses and all orders for this visit:    Dysfunction of right eustachian tube        Earplanes and Afrin when flying  PRN

## 2019-11-19 RX ORDER — MELOXICAM 15 MG/1
15 TABLET ORAL DAILY
Qty: 90 TABLET | Refills: 3 | Status: SHIPPED | OUTPATIENT
Start: 2019-11-19 | End: 2020-10-03

## 2020-02-05 RX ORDER — ATORVASTATIN CALCIUM 20 MG/1
TABLET, FILM COATED ORAL
Qty: 90 TABLET | Refills: 4 | Status: SHIPPED | OUTPATIENT
Start: 2020-02-05 | End: 2021-08-17

## 2020-03-26 RX ORDER — OMEPRAZOLE 20 MG/1
CAPSULE, DELAYED RELEASE ORAL
Qty: 90 CAPSULE | Refills: 3 | Status: SHIPPED | OUTPATIENT
Start: 2020-03-26 | End: 2021-07-26

## 2020-05-14 ENCOUNTER — PATIENT MESSAGE (OUTPATIENT)
Dept: INTERNAL MEDICINE | Facility: CLINIC | Age: 50
End: 2020-05-14

## 2020-05-14 DIAGNOSIS — Z20.822 CLOSE EXPOSURE TO COVID-19 VIRUS: Primary | ICD-10-CM

## 2020-06-22 RX ORDER — SILDENAFIL 50 MG/1
TABLET, FILM COATED ORAL
Qty: 30 TABLET | Refills: 0 | Status: SHIPPED | OUTPATIENT
Start: 2020-06-22

## 2020-06-22 NOTE — TELEPHONE ENCOUNTER
Patient overdue for follow up.  Refill is given but the patient needs an appointment with Dr. Naranjo or Charlene Zamudio for follow up.

## 2020-09-05 DIAGNOSIS — I10 ESSENTIAL HYPERTENSION: ICD-10-CM

## 2020-09-08 RX ORDER — METOPROLOL SUCCINATE 50 MG/1
TABLET, EXTENDED RELEASE ORAL
Qty: 90 TABLET | Refills: 0 | Status: SHIPPED | OUTPATIENT
Start: 2020-09-08 | End: 2020-12-04

## 2020-09-08 RX ORDER — AMLODIPINE BESYLATE 5 MG/1
TABLET ORAL
Qty: 90 TABLET | Refills: 0 | Status: SHIPPED | OUTPATIENT
Start: 2020-09-08 | End: 2020-12-04

## 2020-09-09 NOTE — PROGRESS NOTES
Subjective:       Patient ID: Juan Bateman is a 48 y.o. male.    Chief Complaint: Recurrent Skin Infections and Blister    Rash   This is a recurrent (patient has this rash on both lower extremities from time to time, has been treated with an antibiotic before and it has always cleared up. Now had rash for 7-8 days however it has improved some, still swollen and red) problem. The current episode started 1 to 4 weeks ago. The problem has been gradually improving (the bright red rash is gone but he still has swelling in the legs and some slight redness) since onset. Location: both lower extremities from knees to ankles. The rash is characterized by swelling and redness (states it is tender all over his lower legs not painful in calf area). He was exposed to nothing. Pertinent negatives include no congestion, cough, eye pain, fatigue, fever, joint pain, rhinorrhea, shortness of breath, sore throat or vomiting. Treatments tried: ibuprofen. The treatment provided mild relief.     Review of Systems   Constitutional: Negative for chills, fatigue and fever.   HENT: Negative for congestion, rhinorrhea and sore throat.    Eyes: Negative for pain and redness.   Respiratory: Negative for cough and shortness of breath.    Cardiovascular: Negative for chest pain and leg swelling.   Gastrointestinal: Negative for abdominal pain, nausea and vomiting.   Musculoskeletal: Negative for arthralgias, back pain, gait problem (no pain with walking, just came back from [place] trip with walking 6+miles per day, no exertional calf or thigh pain), joint pain, joint swelling and myalgias.   Skin: Positive for rash.   Neurological: Negative for headaches.       Objective:      BP (!) 140/88   Pulse 77   Temp 96.7 °F (35.9 °C) (Tympanic)   Resp 17   Ht 6' (1.829 m)   Wt 116.5 kg (256 lb 13.4 oz)   SpO2 97%   BMI 34.83 kg/m²   Physical Exam   Constitutional: He is oriented to person, place, and time. He appears well-developed and  well-nourished. No distress.   HENT:   Head: Normocephalic and atraumatic.       Right Ear: External ear normal.   Left Ear: External ear normal.   Nose: Nose normal.   Eyes: Conjunctivae and EOM are normal. Right eye exhibits no discharge. Left eye exhibits no discharge.   Neck: Normal range of motion. Neck supple.   Musculoskeletal:            Neurological: He is alert and oriented to person, place, and time. He has normal reflexes. He displays normal reflexes. Coordination normal.   Skin: Skin is warm and dry. Rash noted. No lesion noted. He is not diaphoretic. There is erythema (hazy and diffuse throughout lower extremities bilaterally).        Vitals reviewed.      Assessment:       1. Herpes labialis    2. Dermatitis        Plan:       Herpes labialis  -     acyclovir (ZOVIRAX) 200 MG capsule; Take 1 capsule (200 mg total) by mouth 5 (five) times daily. for 7 days  Dispense: 35 capsule; Refill: 0    Dermatitis  -     cephALEXin (KEFLEX) 500 MG capsule; Take 1 capsule (500 mg total) by mouth 4 (four) times daily. for 7 days  Dispense: 28 capsule; Refill: 0    Patient brings photos of the rash at onset which has largely resolved. Similar in appearance to images taken during prior rash (noted in his chart). Improvement without antibiotics. Suspect stasis dermatitis initially, now am actually more concerned for possible secondary cellulitis. Will start keflex but advised to patient he may need further work up into source of his initial rash. He states has been on the amlodipine for a long time and does not always have the leg swelling. May wish to discuss further with PCP.      If increased leg swelling or calf pain please go to the ER for evaluation for clot.  Follow up with Cardiology as previously scheduled.  Follow up with Dr. Naranjo to discuss leg rash.      Heather Trant PA-C [facility]   Yes

## 2020-10-06 ENCOUNTER — PATIENT MESSAGE (OUTPATIENT)
Dept: ADMINISTRATIVE | Facility: HOSPITAL | Age: 50
End: 2020-10-06

## 2020-11-20 RX ORDER — CITALOPRAM 20 MG/1
TABLET, FILM COATED ORAL
Qty: 90 TABLET | Refills: 0 | Status: SHIPPED | OUTPATIENT
Start: 2020-11-20 | End: 2021-02-02

## 2020-12-04 DIAGNOSIS — I10 ESSENTIAL HYPERTENSION: ICD-10-CM

## 2020-12-04 RX ORDER — AMLODIPINE BESYLATE 5 MG/1
TABLET ORAL
Qty: 90 TABLET | Refills: 0 | Status: SHIPPED | OUTPATIENT
Start: 2020-12-04 | End: 2021-03-04

## 2020-12-04 RX ORDER — METOPROLOL SUCCINATE 50 MG/1
TABLET, EXTENDED RELEASE ORAL
Qty: 90 TABLET | Refills: 0 | Status: SHIPPED | OUTPATIENT
Start: 2020-12-04 | End: 2021-03-04

## 2021-01-05 ENCOUNTER — PATIENT MESSAGE (OUTPATIENT)
Dept: INTERNAL MEDICINE | Facility: CLINIC | Age: 51
End: 2021-01-05

## 2021-02-22 ENCOUNTER — OFFICE VISIT (OUTPATIENT)
Dept: INTERNAL MEDICINE | Facility: CLINIC | Age: 51
End: 2021-02-22
Payer: OTHER GOVERNMENT

## 2021-02-22 VITALS
HEIGHT: 72 IN | BODY MASS INDEX: 36.52 KG/M2 | HEART RATE: 80 BPM | WEIGHT: 269.63 LBS | DIASTOLIC BLOOD PRESSURE: 88 MMHG | TEMPERATURE: 98 F | SYSTOLIC BLOOD PRESSURE: 136 MMHG

## 2021-02-22 DIAGNOSIS — M54.50 CHRONIC LEFT-SIDED LOW BACK PAIN WITHOUT SCIATICA: ICD-10-CM

## 2021-02-22 DIAGNOSIS — Z00.00 ROUTINE GENERAL MEDICAL EXAMINATION AT HEALTH CARE FACILITY: Primary | ICD-10-CM

## 2021-02-22 DIAGNOSIS — I10 ESSENTIAL HYPERTENSION: Primary | ICD-10-CM

## 2021-02-22 DIAGNOSIS — G89.29 CHRONIC LEFT-SIDED LOW BACK PAIN WITHOUT SCIATICA: ICD-10-CM

## 2021-02-22 PROBLEM — M79.661 PAIN IN BOTH LOWER LEGS: Status: RESOLVED | Noted: 2018-11-29 | Resolved: 2021-02-22

## 2021-02-22 PROBLEM — M79.662 PAIN IN BOTH LOWER LEGS: Status: RESOLVED | Noted: 2018-11-29 | Resolved: 2021-02-22

## 2021-02-22 PROCEDURE — 99214 OFFICE O/P EST MOD 30 MIN: CPT | Mod: S$PBB,,, | Performed by: INTERNAL MEDICINE

## 2021-02-22 PROCEDURE — 99999 PR PBB SHADOW E&M-EST. PATIENT-LVL IV: ICD-10-PCS | Mod: PBBFAC,,, | Performed by: INTERNAL MEDICINE

## 2021-02-22 PROCEDURE — 99999 PR PBB SHADOW E&M-EST. PATIENT-LVL IV: CPT | Mod: PBBFAC,,, | Performed by: INTERNAL MEDICINE

## 2021-02-22 PROCEDURE — 99214 PR OFFICE/OUTPT VISIT, EST, LEVL IV, 30-39 MIN: ICD-10-PCS | Mod: S$PBB,,, | Performed by: INTERNAL MEDICINE

## 2021-02-22 PROCEDURE — 99214 OFFICE O/P EST MOD 30 MIN: CPT | Mod: PBBFAC,PO | Performed by: INTERNAL MEDICINE

## 2021-02-22 RX ORDER — CELECOXIB 200 MG/1
200 CAPSULE ORAL DAILY
Qty: 30 CAPSULE | Refills: 3 | Status: SHIPPED | OUTPATIENT
Start: 2021-02-22 | End: 2021-06-07 | Stop reason: SDUPTHER

## 2021-02-22 RX ORDER — CYCLOBENZAPRINE HCL 10 MG
10 TABLET ORAL NIGHTLY
Qty: 10 TABLET | Refills: 0 | Status: SHIPPED | OUTPATIENT
Start: 2021-02-22 | End: 2021-03-04

## 2021-03-15 ENCOUNTER — LAB VISIT (OUTPATIENT)
Dept: LAB | Facility: HOSPITAL | Age: 51
End: 2021-03-15
Attending: INTERNAL MEDICINE
Payer: OTHER GOVERNMENT

## 2021-03-15 DIAGNOSIS — Z00.00 ROUTINE GENERAL MEDICAL EXAMINATION AT HEALTH CARE FACILITY: ICD-10-CM

## 2021-03-15 LAB
ALBUMIN SERPL BCP-MCNC: 4 G/DL (ref 3.5–5.2)
ALP SERPL-CCNC: 70 U/L (ref 55–135)
ALT SERPL W/O P-5'-P-CCNC: 35 U/L (ref 10–44)
ANION GAP SERPL CALC-SCNC: 10 MMOL/L (ref 8–16)
AST SERPL-CCNC: 30 U/L (ref 10–40)
BASOPHILS # BLD AUTO: 0.03 K/UL (ref 0–0.2)
BASOPHILS NFR BLD: 0.8 % (ref 0–1.9)
BILIRUB SERPL-MCNC: 0.6 MG/DL (ref 0.1–1)
BUN SERPL-MCNC: 18 MG/DL (ref 6–20)
CALCIUM SERPL-MCNC: 8.7 MG/DL (ref 8.7–10.5)
CHLORIDE SERPL-SCNC: 105 MMOL/L (ref 95–110)
CHOLEST SERPL-MCNC: 147 MG/DL (ref 120–199)
CHOLEST/HDLC SERPL: 4.2 {RATIO} (ref 2–5)
CO2 SERPL-SCNC: 26 MMOL/L (ref 23–29)
COMPLEXED PSA SERPL-MCNC: 0.19 NG/ML (ref 0–4)
CREAT SERPL-MCNC: 0.8 MG/DL (ref 0.5–1.4)
DIFFERENTIAL METHOD: ABNORMAL
EOSINOPHIL # BLD AUTO: 0.1 K/UL (ref 0–0.5)
EOSINOPHIL NFR BLD: 1.3 % (ref 0–8)
ERYTHROCYTE [DISTWIDTH] IN BLOOD BY AUTOMATED COUNT: 13.1 % (ref 11.5–14.5)
EST. GFR  (AFRICAN AMERICAN): >60 ML/MIN/1.73 M^2
EST. GFR  (NON AFRICAN AMERICAN): >60 ML/MIN/1.73 M^2
GLUCOSE SERPL-MCNC: 97 MG/DL (ref 70–110)
HCT VFR BLD AUTO: 39.2 % (ref 40–54)
HDLC SERPL-MCNC: 35 MG/DL (ref 40–75)
HDLC SERPL: 23.8 % (ref 20–50)
HGB BLD-MCNC: 12.7 G/DL (ref 14–18)
IMM GRANULOCYTES # BLD AUTO: 0.02 K/UL (ref 0–0.04)
IMM GRANULOCYTES NFR BLD AUTO: 0.5 % (ref 0–0.5)
LDLC SERPL CALC-MCNC: 84.6 MG/DL (ref 63–159)
LYMPHOCYTES # BLD AUTO: 1.3 K/UL (ref 1–4.8)
LYMPHOCYTES NFR BLD: 34.9 % (ref 18–48)
MCH RBC QN AUTO: 29.5 PG (ref 27–31)
MCHC RBC AUTO-ENTMCNC: 32.4 G/DL (ref 32–36)
MCV RBC AUTO: 91 FL (ref 82–98)
MONOCYTES # BLD AUTO: 0.5 K/UL (ref 0.3–1)
MONOCYTES NFR BLD: 12.8 % (ref 4–15)
NEUTROPHILS # BLD AUTO: 1.9 K/UL (ref 1.8–7.7)
NEUTROPHILS NFR BLD: 49.7 % (ref 38–73)
NONHDLC SERPL-MCNC: 112 MG/DL
NRBC BLD-RTO: 0 /100 WBC
PLATELET # BLD AUTO: 202 K/UL (ref 150–350)
PMV BLD AUTO: 11.3 FL (ref 9.2–12.9)
POTASSIUM SERPL-SCNC: 4.3 MMOL/L (ref 3.5–5.1)
PROT SERPL-MCNC: 6.7 G/DL (ref 6–8.4)
RBC # BLD AUTO: 4.31 M/UL (ref 4.6–6.2)
SODIUM SERPL-SCNC: 141 MMOL/L (ref 136–145)
TRIGL SERPL-MCNC: 137 MG/DL (ref 30–150)
WBC # BLD AUTO: 3.75 K/UL (ref 3.9–12.7)

## 2021-03-15 PROCEDURE — 85025 COMPLETE CBC W/AUTO DIFF WBC: CPT | Performed by: INTERNAL MEDICINE

## 2021-03-15 PROCEDURE — 84153 ASSAY OF PSA TOTAL: CPT | Performed by: INTERNAL MEDICINE

## 2021-03-15 PROCEDURE — 80053 COMPREHEN METABOLIC PANEL: CPT | Performed by: INTERNAL MEDICINE

## 2021-03-15 PROCEDURE — 80061 LIPID PANEL: CPT | Performed by: INTERNAL MEDICINE

## 2021-03-15 PROCEDURE — 36415 COLL VENOUS BLD VENIPUNCTURE: CPT | Mod: PO | Performed by: INTERNAL MEDICINE

## 2021-03-22 ENCOUNTER — OFFICE VISIT (OUTPATIENT)
Dept: INTERNAL MEDICINE | Facility: CLINIC | Age: 51
End: 2021-03-22
Payer: OTHER GOVERNMENT

## 2021-03-22 ENCOUNTER — LAB VISIT (OUTPATIENT)
Dept: LAB | Facility: HOSPITAL | Age: 51
End: 2021-03-22
Attending: INTERNAL MEDICINE
Payer: OTHER GOVERNMENT

## 2021-03-22 VITALS
TEMPERATURE: 98 F | BODY MASS INDEX: 36.1 KG/M2 | HEART RATE: 70 BPM | WEIGHT: 266.56 LBS | DIASTOLIC BLOOD PRESSURE: 86 MMHG | HEIGHT: 72 IN | SYSTOLIC BLOOD PRESSURE: 132 MMHG

## 2021-03-22 DIAGNOSIS — I10 ESSENTIAL HYPERTENSION: ICD-10-CM

## 2021-03-22 DIAGNOSIS — Z00.00 ROUTINE GENERAL MEDICAL EXAMINATION AT HEALTH CARE FACILITY: Primary | ICD-10-CM

## 2021-03-22 DIAGNOSIS — D64.9 ANEMIA, UNSPECIFIED TYPE: ICD-10-CM

## 2021-03-22 DIAGNOSIS — Z23 NEED FOR SHINGLES VACCINE: ICD-10-CM

## 2021-03-22 DIAGNOSIS — E78.2 MIXED HYPERLIPIDEMIA: ICD-10-CM

## 2021-03-22 DIAGNOSIS — G47.33 OSA ON CPAP: Chronic | ICD-10-CM

## 2021-03-22 PROCEDURE — 90471 IMMUNIZATION ADMIN: CPT | Mod: PBBFAC,PO

## 2021-03-22 PROCEDURE — 36415 COLL VENOUS BLD VENIPUNCTURE: CPT | Mod: PO | Performed by: INTERNAL MEDICINE

## 2021-03-22 PROCEDURE — 99214 OFFICE O/P EST MOD 30 MIN: CPT | Mod: PBBFAC,PO,25 | Performed by: INTERNAL MEDICINE

## 2021-03-22 PROCEDURE — 84165 PROTEIN E-PHORESIS SERUM: CPT | Performed by: INTERNAL MEDICINE

## 2021-03-22 PROCEDURE — 84165 PATHOLOGIST INTERPRETATION SPE: ICD-10-PCS | Mod: 26,,, | Performed by: PATHOLOGY

## 2021-03-22 PROCEDURE — 99396 PREV VISIT EST AGE 40-64: CPT | Mod: 25,S$PBB,, | Performed by: INTERNAL MEDICINE

## 2021-03-22 PROCEDURE — 99999 PR PBB SHADOW E&M-EST. PATIENT-LVL IV: ICD-10-PCS | Mod: PBBFAC,,, | Performed by: INTERNAL MEDICINE

## 2021-03-22 PROCEDURE — 83540 ASSAY OF IRON: CPT | Performed by: INTERNAL MEDICINE

## 2021-03-22 PROCEDURE — 99999 PR PBB SHADOW E&M-EST. PATIENT-LVL IV: CPT | Mod: PBBFAC,,, | Performed by: INTERNAL MEDICINE

## 2021-03-22 PROCEDURE — 84165 PROTEIN E-PHORESIS SERUM: CPT | Mod: 26,,, | Performed by: PATHOLOGY

## 2021-03-22 PROCEDURE — 82607 VITAMIN B-12: CPT | Performed by: INTERNAL MEDICINE

## 2021-03-22 PROCEDURE — 82728 ASSAY OF FERRITIN: CPT | Performed by: INTERNAL MEDICINE

## 2021-03-22 PROCEDURE — 99396 PR PREVENTIVE VISIT,EST,40-64: ICD-10-PCS | Mod: 25,S$PBB,, | Performed by: INTERNAL MEDICINE

## 2021-03-22 PROCEDURE — 83520 IMMUNOASSAY QUANT NOS NONAB: CPT | Mod: 59 | Performed by: INTERNAL MEDICINE

## 2021-03-23 ENCOUNTER — PATIENT MESSAGE (OUTPATIENT)
Dept: INTERNAL MEDICINE | Facility: CLINIC | Age: 51
End: 2021-03-23

## 2021-03-23 DIAGNOSIS — D64.9 ANEMIA, UNSPECIFIED TYPE: Primary | ICD-10-CM

## 2021-03-23 LAB
ALBUMIN SERPL ELPH-MCNC: 4.19 G/DL (ref 3.35–5.55)
ALPHA1 GLOB SERPL ELPH-MCNC: 0.23 G/DL (ref 0.17–0.41)
ALPHA2 GLOB SERPL ELPH-MCNC: 0.57 G/DL (ref 0.43–0.99)
B-GLOBULIN SERPL ELPH-MCNC: 0.78 G/DL (ref 0.5–1.1)
FERRITIN SERPL-MCNC: 62 NG/ML (ref 20–300)
GAMMA GLOB SERPL ELPH-MCNC: 0.74 G/DL (ref 0.67–1.58)
IRON SERPL-MCNC: 75 UG/DL (ref 45–160)
KAPPA LC SER QL IA: 1.06 MG/DL (ref 0.33–1.94)
KAPPA LC/LAMBDA SER IA: 0.79 (ref 0.26–1.65)
LAMBDA LC SER QL IA: 1.34 MG/DL (ref 0.57–2.63)
PROT SERPL-MCNC: 6.5 G/DL (ref 6–8.4)
SATURATED IRON: 19 % (ref 20–50)
TOTAL IRON BINDING CAPACITY: 398 UG/DL (ref 250–450)
TRANSFERRIN SERPL-MCNC: 269 MG/DL (ref 200–375)
VIT B12 SERPL-MCNC: 369 PG/ML (ref 210–950)

## 2021-03-24 LAB — PATHOLOGIST INTERPRETATION SPE: NORMAL

## 2021-04-12 ENCOUNTER — PATIENT MESSAGE (OUTPATIENT)
Dept: INTERNAL MEDICINE | Facility: CLINIC | Age: 51
End: 2021-04-12

## 2021-04-12 DIAGNOSIS — M47.817 SPONDYLOSIS WITHOUT MYELOPATHY OR RADICULOPATHY, LUMBOSACRAL REGION: ICD-10-CM

## 2021-04-13 ENCOUNTER — HOSPITAL ENCOUNTER (OUTPATIENT)
Dept: RADIOLOGY | Facility: HOSPITAL | Age: 51
Discharge: HOME | End: 2021-04-13
Attending: INTERNAL MEDICINE
Payer: OTHER GOVERNMENT

## 2021-04-13 DIAGNOSIS — M47.817 SPONDYLOSIS WITHOUT MYELOPATHY OR RADICULOPATHY, LUMBOSACRAL REGION: ICD-10-CM

## 2021-04-13 PROCEDURE — 72100 X-RAY EXAM L-S SPINE 2/3 VWS: CPT | Mod: TC,FY,PO

## 2021-04-13 PROCEDURE — 72100 XR LUMBAR SPINE AP AND LATERAL: ICD-10-PCS | Mod: 26,,, | Performed by: RADIOLOGY

## 2021-04-13 PROCEDURE — 72100 X-RAY EXAM L-S SPINE 2/3 VWS: CPT | Mod: 26,,, | Performed by: RADIOLOGY

## 2021-04-22 ENCOUNTER — PATIENT MESSAGE (OUTPATIENT)
Dept: INTERNAL MEDICINE | Facility: CLINIC | Age: 51
End: 2021-04-22

## 2021-04-23 ENCOUNTER — TELEPHONE (OUTPATIENT)
Dept: INTERNAL MEDICINE | Facility: CLINIC | Age: 51
End: 2021-04-23

## 2021-04-23 ENCOUNTER — TELEPHONE (OUTPATIENT)
Dept: PAIN MEDICINE | Facility: CLINIC | Age: 51
End: 2021-04-23

## 2021-04-23 DIAGNOSIS — M47.817 SPONDYLOSIS WITHOUT MYELOPATHY OR RADICULOPATHY, LUMBOSACRAL REGION: Primary | ICD-10-CM

## 2021-04-30 ENCOUNTER — PATIENT MESSAGE (OUTPATIENT)
Dept: INTERNAL MEDICINE | Facility: CLINIC | Age: 51
End: 2021-04-30

## 2021-05-20 ENCOUNTER — PATIENT MESSAGE (OUTPATIENT)
Dept: INTERNAL MEDICINE | Facility: CLINIC | Age: 51
End: 2021-05-20

## 2021-05-20 DIAGNOSIS — M47.817 SPONDYLOSIS WITHOUT MYELOPATHY OR RADICULOPATHY, LUMBOSACRAL REGION: Primary | ICD-10-CM

## 2021-05-21 ENCOUNTER — CLINICAL SUPPORT (OUTPATIENT)
Dept: INTERNAL MEDICINE | Facility: CLINIC | Age: 51
End: 2021-05-21
Payer: OTHER GOVERNMENT

## 2021-05-21 DIAGNOSIS — Z23 NEED FOR SHINGLES VACCINE: Primary | ICD-10-CM

## 2021-05-21 PROCEDURE — 99999 PR PBB SHADOW E&M-EST. PATIENT-LVL II: ICD-10-PCS | Mod: PBBFAC,,,

## 2021-05-21 PROCEDURE — 99212 OFFICE O/P EST SF 10 MIN: CPT | Mod: PBBFAC,PO

## 2021-05-21 PROCEDURE — 90471 IMMUNIZATION ADMIN: CPT | Mod: PBBFAC,PO

## 2021-05-21 PROCEDURE — 90750 HZV VACC RECOMBINANT IM: CPT | Mod: PBBFAC,PO

## 2021-05-21 PROCEDURE — 99999 PR PBB SHADOW E&M-EST. PATIENT-LVL II: CPT | Mod: PBBFAC,,,

## 2021-05-24 ENCOUNTER — LAB VISIT (OUTPATIENT)
Dept: LAB | Facility: HOSPITAL | Age: 51
End: 2021-05-24
Attending: INTERNAL MEDICINE
Payer: OTHER GOVERNMENT

## 2021-05-24 DIAGNOSIS — D64.9 ANEMIA, UNSPECIFIED TYPE: ICD-10-CM

## 2021-05-24 LAB
BASOPHILS # BLD AUTO: 0.04 K/UL (ref 0–0.2)
BASOPHILS NFR BLD: 0.9 % (ref 0–1.9)
DIFFERENTIAL METHOD: ABNORMAL
EOSINOPHIL # BLD AUTO: 0.1 K/UL (ref 0–0.5)
EOSINOPHIL NFR BLD: 1.7 % (ref 0–8)
ERYTHROCYTE [DISTWIDTH] IN BLOOD BY AUTOMATED COUNT: 12.7 % (ref 11.5–14.5)
HCT VFR BLD AUTO: 41.9 % (ref 40–54)
HGB BLD-MCNC: 13.4 G/DL (ref 14–18)
IMM GRANULOCYTES # BLD AUTO: 0.01 K/UL (ref 0–0.04)
IMM GRANULOCYTES NFR BLD AUTO: 0.2 % (ref 0–0.5)
LYMPHOCYTES # BLD AUTO: 1.6 K/UL (ref 1–4.8)
LYMPHOCYTES NFR BLD: 37.6 % (ref 18–48)
MCH RBC QN AUTO: 28.7 PG (ref 27–31)
MCHC RBC AUTO-ENTMCNC: 32 G/DL (ref 32–36)
MCV RBC AUTO: 90 FL (ref 82–98)
MONOCYTES # BLD AUTO: 0.6 K/UL (ref 0.3–1)
MONOCYTES NFR BLD: 14.9 % (ref 4–15)
NEUTROPHILS # BLD AUTO: 1.9 K/UL (ref 1.8–7.7)
NEUTROPHILS NFR BLD: 44.7 % (ref 38–73)
NRBC BLD-RTO: 0 /100 WBC
PLATELET # BLD AUTO: 224 K/UL (ref 150–450)
PMV BLD AUTO: 11.6 FL (ref 9.2–12.9)
RBC # BLD AUTO: 4.67 M/UL (ref 4.6–6.2)
WBC # BLD AUTO: 4.23 K/UL (ref 3.9–12.7)

## 2021-05-24 PROCEDURE — 82728 ASSAY OF FERRITIN: CPT | Performed by: INTERNAL MEDICINE

## 2021-05-24 PROCEDURE — 85025 COMPLETE CBC W/AUTO DIFF WBC: CPT | Performed by: INTERNAL MEDICINE

## 2021-05-24 PROCEDURE — 36415 COLL VENOUS BLD VENIPUNCTURE: CPT | Mod: PO | Performed by: INTERNAL MEDICINE

## 2021-05-24 PROCEDURE — 83540 ASSAY OF IRON: CPT | Performed by: INTERNAL MEDICINE

## 2021-05-25 ENCOUNTER — PATIENT MESSAGE (OUTPATIENT)
Dept: INTERNAL MEDICINE | Facility: CLINIC | Age: 51
End: 2021-05-25

## 2021-05-25 DIAGNOSIS — D50.9 IRON DEFICIENCY ANEMIA, UNSPECIFIED IRON DEFICIENCY ANEMIA TYPE: Primary | ICD-10-CM

## 2021-05-25 LAB
FERRITIN SERPL-MCNC: 107 NG/ML (ref 20–300)
IRON SERPL-MCNC: 60 UG/DL (ref 45–160)
SATURATED IRON: 14 % (ref 20–50)
TOTAL IRON BINDING CAPACITY: 420 UG/DL (ref 250–450)
TRANSFERRIN SERPL-MCNC: 284 MG/DL (ref 200–375)

## 2021-05-25 RX ORDER — IRON,CARBONYL/ASCORBIC ACID 100-250 MG
1 TABLET ORAL DAILY
Qty: 30 TABLET | Refills: 11 | Status: SHIPPED | OUTPATIENT
Start: 2021-05-25 | End: 2021-07-27 | Stop reason: ALTCHOICE

## 2021-05-26 ENCOUNTER — TELEPHONE (OUTPATIENT)
Dept: INTERNAL MEDICINE | Facility: CLINIC | Age: 51
End: 2021-05-26

## 2021-06-07 DIAGNOSIS — M54.50 CHRONIC LEFT-SIDED LOW BACK PAIN WITHOUT SCIATICA: ICD-10-CM

## 2021-06-07 DIAGNOSIS — G89.29 CHRONIC LEFT-SIDED LOW BACK PAIN WITHOUT SCIATICA: ICD-10-CM

## 2021-06-07 RX ORDER — CELECOXIB 200 MG/1
200 CAPSULE ORAL DAILY
Qty: 30 CAPSULE | Refills: 3 | Status: SHIPPED | OUTPATIENT
Start: 2021-06-07 | End: 2022-05-12 | Stop reason: SDUPTHER

## 2021-07-13 ENCOUNTER — OFFICE VISIT (OUTPATIENT)
Dept: URGENT CARE | Facility: CLINIC | Age: 51
End: 2021-07-13
Payer: OTHER GOVERNMENT

## 2021-07-13 VITALS
WEIGHT: 266 LBS | SYSTOLIC BLOOD PRESSURE: 138 MMHG | RESPIRATION RATE: 20 BRPM | HEART RATE: 91 BPM | HEIGHT: 72 IN | TEMPERATURE: 98 F | DIASTOLIC BLOOD PRESSURE: 93 MMHG | OXYGEN SATURATION: 98 % | BODY MASS INDEX: 36.03 KG/M2

## 2021-07-13 DIAGNOSIS — M54.50 ACUTE MIDLINE LOW BACK PAIN WITHOUT SCIATICA: Primary | ICD-10-CM

## 2021-07-13 PROCEDURE — 99214 OFFICE O/P EST MOD 30 MIN: CPT | Mod: S$GLB,,, | Performed by: NURSE PRACTITIONER

## 2021-07-13 PROCEDURE — 99214 PR OFFICE/OUTPT VISIT, EST, LEVL IV, 30-39 MIN: ICD-10-PCS | Mod: S$GLB,,, | Performed by: NURSE PRACTITIONER

## 2021-07-13 RX ORDER — TIZANIDINE 4 MG/1
4 TABLET ORAL EVERY 6 HOURS PRN
Qty: 20 TABLET | Refills: 0 | Status: SHIPPED | OUTPATIENT
Start: 2021-07-13 | End: 2022-05-12

## 2021-07-13 RX ORDER — PREDNISONE 20 MG/1
20 TABLET ORAL 2 TIMES DAILY
Qty: 10 TABLET | Refills: 0 | Status: SHIPPED | OUTPATIENT
Start: 2021-07-13 | End: 2021-07-18

## 2021-07-15 ENCOUNTER — PATIENT MESSAGE (OUTPATIENT)
Dept: INTERNAL MEDICINE | Facility: CLINIC | Age: 51
End: 2021-07-15

## 2021-07-15 DIAGNOSIS — M47.817 SPONDYLOSIS WITHOUT MYELOPATHY OR RADICULOPATHY, LUMBOSACRAL REGION: Primary | ICD-10-CM

## 2021-07-26 ENCOUNTER — LAB VISIT (OUTPATIENT)
Dept: LAB | Facility: HOSPITAL | Age: 51
End: 2021-07-26
Payer: OTHER GOVERNMENT

## 2021-07-26 DIAGNOSIS — D50.9 IRON DEFICIENCY ANEMIA, UNSPECIFIED IRON DEFICIENCY ANEMIA TYPE: ICD-10-CM

## 2021-07-26 LAB
BASOPHILS # BLD AUTO: 0.04 K/UL (ref 0–0.2)
BASOPHILS NFR BLD: 0.7 % (ref 0–1.9)
DIFFERENTIAL METHOD: ABNORMAL
EOSINOPHIL # BLD AUTO: 0.1 K/UL (ref 0–0.5)
EOSINOPHIL NFR BLD: 1.3 % (ref 0–8)
ERYTHROCYTE [DISTWIDTH] IN BLOOD BY AUTOMATED COUNT: 13 % (ref 11.5–14.5)
FERRITIN SERPL-MCNC: 91 NG/ML (ref 20–300)
HCT VFR BLD AUTO: 42.2 % (ref 40–54)
HGB BLD-MCNC: 13.9 G/DL (ref 14–18)
IMM GRANULOCYTES # BLD AUTO: 0.02 K/UL (ref 0–0.04)
IMM GRANULOCYTES NFR BLD AUTO: 0.4 % (ref 0–0.5)
IRON SERPL-MCNC: 142 UG/DL (ref 45–160)
LYMPHOCYTES # BLD AUTO: 1.8 K/UL (ref 1–4.8)
LYMPHOCYTES NFR BLD: 32.9 % (ref 18–48)
MCH RBC QN AUTO: 29.6 PG (ref 27–31)
MCHC RBC AUTO-ENTMCNC: 32.9 G/DL (ref 32–36)
MCV RBC AUTO: 90 FL (ref 82–98)
MONOCYTES # BLD AUTO: 0.7 K/UL (ref 0.3–1)
MONOCYTES NFR BLD: 11.9 % (ref 4–15)
NEUTROPHILS # BLD AUTO: 2.9 K/UL (ref 1.8–7.7)
NEUTROPHILS NFR BLD: 52.8 % (ref 38–73)
NRBC BLD-RTO: 0 /100 WBC
PLATELET # BLD AUTO: 216 K/UL (ref 150–450)
PMV BLD AUTO: 11.5 FL (ref 9.2–12.9)
RBC # BLD AUTO: 4.7 M/UL (ref 4.6–6.2)
SATURATED IRON: 35 % (ref 20–50)
TOTAL IRON BINDING CAPACITY: 410 UG/DL (ref 250–450)
TRANSFERRIN SERPL-MCNC: 277 MG/DL (ref 200–375)
WBC # BLD AUTO: 5.56 K/UL (ref 3.9–12.7)

## 2021-07-26 PROCEDURE — 82728 ASSAY OF FERRITIN: CPT | Performed by: INTERNAL MEDICINE

## 2021-07-26 PROCEDURE — 85025 COMPLETE CBC W/AUTO DIFF WBC: CPT | Performed by: INTERNAL MEDICINE

## 2021-07-26 PROCEDURE — 83540 ASSAY OF IRON: CPT | Performed by: INTERNAL MEDICINE

## 2021-07-26 PROCEDURE — 36415 COLL VENOUS BLD VENIPUNCTURE: CPT | Mod: PO | Performed by: INTERNAL MEDICINE

## 2021-07-27 ENCOUNTER — PATIENT MESSAGE (OUTPATIENT)
Dept: INTERNAL MEDICINE | Facility: CLINIC | Age: 51
End: 2021-07-27

## 2021-07-27 DIAGNOSIS — D50.9 IRON DEFICIENCY ANEMIA, UNSPECIFIED IRON DEFICIENCY ANEMIA TYPE: Primary | ICD-10-CM

## 2021-10-12 ENCOUNTER — LAB VISIT (OUTPATIENT)
Dept: LAB | Facility: HOSPITAL | Age: 51
End: 2021-10-12
Attending: INTERNAL MEDICINE
Payer: OTHER GOVERNMENT

## 2021-10-12 DIAGNOSIS — D50.9 IRON DEFICIENCY ANEMIA, UNSPECIFIED IRON DEFICIENCY ANEMIA TYPE: ICD-10-CM

## 2021-10-12 LAB
BASOPHILS # BLD AUTO: 0.03 K/UL (ref 0–0.2)
BASOPHILS NFR BLD: 0.8 % (ref 0–1.9)
DIFFERENTIAL METHOD: ABNORMAL
EOSINOPHIL # BLD AUTO: 0.1 K/UL (ref 0–0.5)
EOSINOPHIL NFR BLD: 1.5 % (ref 0–8)
ERYTHROCYTE [DISTWIDTH] IN BLOOD BY AUTOMATED COUNT: 13 % (ref 11.5–14.5)
FERRITIN SERPL-MCNC: 92 NG/ML (ref 20–300)
HCT VFR BLD AUTO: 41.8 % (ref 40–54)
HGB BLD-MCNC: 13.7 G/DL (ref 14–18)
IMM GRANULOCYTES # BLD AUTO: 0.01 K/UL (ref 0–0.04)
IMM GRANULOCYTES NFR BLD AUTO: 0.3 % (ref 0–0.5)
IRON SERPL-MCNC: 98 UG/DL (ref 45–160)
LYMPHOCYTES # BLD AUTO: 1.5 K/UL (ref 1–4.8)
LYMPHOCYTES NFR BLD: 36.5 % (ref 18–48)
MCH RBC QN AUTO: 29.7 PG (ref 27–31)
MCHC RBC AUTO-ENTMCNC: 32.8 G/DL (ref 32–36)
MCV RBC AUTO: 91 FL (ref 82–98)
MONOCYTES # BLD AUTO: 0.4 K/UL (ref 0.3–1)
MONOCYTES NFR BLD: 10.3 % (ref 4–15)
NEUTROPHILS # BLD AUTO: 2 K/UL (ref 1.8–7.7)
NEUTROPHILS NFR BLD: 50.6 % (ref 38–73)
NRBC BLD-RTO: 0 /100 WBC
PLATELET # BLD AUTO: 217 K/UL (ref 150–450)
PMV BLD AUTO: 11.4 FL (ref 9.2–12.9)
RBC # BLD AUTO: 4.61 M/UL (ref 4.6–6.2)
SATURATED IRON: 23 % (ref 20–50)
TOTAL IRON BINDING CAPACITY: 420 UG/DL (ref 250–450)
TRANSFERRIN SERPL-MCNC: 284 MG/DL (ref 200–375)
WBC # BLD AUTO: 4 K/UL (ref 3.9–12.7)

## 2021-10-12 PROCEDURE — 84466 ASSAY OF TRANSFERRIN: CPT | Performed by: INTERNAL MEDICINE

## 2021-10-12 PROCEDURE — 85025 COMPLETE CBC W/AUTO DIFF WBC: CPT | Performed by: INTERNAL MEDICINE

## 2021-10-12 PROCEDURE — 36415 COLL VENOUS BLD VENIPUNCTURE: CPT | Mod: PO | Performed by: INTERNAL MEDICINE

## 2021-10-12 PROCEDURE — 82728 ASSAY OF FERRITIN: CPT | Performed by: INTERNAL MEDICINE

## 2021-11-23 ENCOUNTER — PATIENT MESSAGE (OUTPATIENT)
Dept: INTERNAL MEDICINE | Facility: CLINIC | Age: 51
End: 2021-11-23
Payer: OTHER GOVERNMENT

## 2022-02-28 DIAGNOSIS — I10 ESSENTIAL HYPERTENSION: ICD-10-CM

## 2022-02-28 NOTE — TELEPHONE ENCOUNTER
Care Due:                  Date            Visit Type   Department     Provider  --------------------------------------------------------------------------------                                EP -                              PRIMARY      [facility] INTERNAL  Last Visit: 03-      CARE ([facility])   MEDICINE       Nigel Naranjo  Next Visit: None Scheduled  None         None Found                                                            Last  Test          Frequency    Reason                     Performed    Due Date  --------------------------------------------------------------------------------    Office Visit  12 months..  atorvastatin, citalopram,   03- 03-                             finasteride, omeprazole..    CMP.........  12 months..  atorvastatin.............  03-   03-    Lipid Panel.  12 months..  atorvastatin.............  03-   03-    Powered by [employer]. Reference number: [id number].   2/28/2022 1:03:12 AM CST

## 2022-03-03 RX ORDER — AMLODIPINE BESYLATE 5 MG/1
TABLET ORAL
Qty: 90 TABLET | Refills: 0 | Status: SHIPPED | OUTPATIENT
Start: 2022-03-03 | End: 2022-05-12 | Stop reason: SDUPTHER

## 2022-03-03 RX ORDER — METOPROLOL SUCCINATE 50 MG/1
TABLET, EXTENDED RELEASE ORAL
Qty: 90 TABLET | Refills: 0 | Status: SHIPPED | OUTPATIENT
Start: 2022-03-03 | End: 2022-05-12 | Stop reason: SDUPTHER

## 2022-03-22 ENCOUNTER — PATIENT MESSAGE (OUTPATIENT)
Dept: ADMINISTRATIVE | Facility: HOSPITAL | Age: 52
End: 2022-03-22
Payer: OTHER GOVERNMENT

## 2022-03-22 ENCOUNTER — PATIENT OUTREACH (OUTPATIENT)
Dept: ADMINISTRATIVE | Facility: HOSPITAL | Age: 52
End: 2022-03-22
Payer: OTHER GOVERNMENT

## 2022-03-22 DIAGNOSIS — E78.2 MIXED HYPERLIPIDEMIA: ICD-10-CM

## 2022-03-22 DIAGNOSIS — Z00.00 ROUTINE GENERAL MEDICAL EXAMINATION AT HEALTH CARE FACILITY: ICD-10-CM

## 2022-03-22 DIAGNOSIS — I10 PRIMARY HYPERTENSION: Primary | ICD-10-CM

## 2022-03-22 NOTE — Clinical Note
I have scheduled annual exam with CMP and Lipid prior. If you would like any other labs, please place orders and let me know I will link them to appt.

## 2022-03-22 NOTE — PROGRESS NOTES
Working HTN Report.    Requested an updated bp reading. Said he can't do it now, nut would later and send reading through portal. Mess sent as a reminder.  Assisted to schedule annual exam, 5/12/22 with labs prior on 5/10/22.

## 2022-05-09 ENCOUNTER — LAB VISIT (OUTPATIENT)
Dept: LAB | Facility: HOSPITAL | Age: 52
End: 2022-05-09
Attending: INTERNAL MEDICINE
Payer: OTHER GOVERNMENT

## 2022-05-09 DIAGNOSIS — E78.2 MIXED HYPERLIPIDEMIA: ICD-10-CM

## 2022-05-09 DIAGNOSIS — I10 PRIMARY HYPERTENSION: ICD-10-CM

## 2022-05-09 DIAGNOSIS — Z00.00 ROUTINE GENERAL MEDICAL EXAMINATION AT HEALTH CARE FACILITY: ICD-10-CM

## 2022-05-09 LAB
ALBUMIN SERPL BCP-MCNC: 4.1 G/DL (ref 3.5–5.2)
ALP SERPL-CCNC: 74 U/L (ref 55–135)
ALT SERPL W/O P-5'-P-CCNC: 28 U/L (ref 10–44)
ANION GAP SERPL CALC-SCNC: 7 MMOL/L (ref 8–16)
AST SERPL-CCNC: 22 U/L (ref 10–40)
BASOPHILS # BLD AUTO: 0.03 K/UL (ref 0–0.2)
BASOPHILS NFR BLD: 0.6 % (ref 0–1.9)
BILIRUB SERPL-MCNC: 0.6 MG/DL (ref 0.1–1)
BUN SERPL-MCNC: 13 MG/DL (ref 6–20)
CALCIUM SERPL-MCNC: 9.3 MG/DL (ref 8.7–10.5)
CHLORIDE SERPL-SCNC: 104 MMOL/L (ref 95–110)
CHOLEST SERPL-MCNC: 164 MG/DL (ref 120–199)
CHOLEST/HDLC SERPL: 3.2 {RATIO} (ref 2–5)
CO2 SERPL-SCNC: 29 MMOL/L (ref 23–29)
COMPLEXED PSA SERPL-MCNC: 0.33 NG/ML (ref 0–4)
CREAT SERPL-MCNC: 0.8 MG/DL (ref 0.5–1.4)
DIFFERENTIAL METHOD: ABNORMAL
EOSINOPHIL # BLD AUTO: 0.1 K/UL (ref 0–0.5)
EOSINOPHIL NFR BLD: 1 % (ref 0–8)
ERYTHROCYTE [DISTWIDTH] IN BLOOD BY AUTOMATED COUNT: 13.1 % (ref 11.5–14.5)
EST. GFR  (AFRICAN AMERICAN): >60 ML/MIN/1.73 M^2
EST. GFR  (NON AFRICAN AMERICAN): >60 ML/MIN/1.73 M^2
GLUCOSE SERPL-MCNC: 90 MG/DL (ref 70–110)
HCT VFR BLD AUTO: 40.3 % (ref 40–54)
HDLC SERPL-MCNC: 51 MG/DL (ref 40–75)
HDLC SERPL: 31.1 % (ref 20–50)
HGB BLD-MCNC: 12.8 G/DL (ref 14–18)
IMM GRANULOCYTES # BLD AUTO: 0.01 K/UL (ref 0–0.04)
IMM GRANULOCYTES NFR BLD AUTO: 0.2 % (ref 0–0.5)
LDLC SERPL CALC-MCNC: 92.6 MG/DL (ref 63–159)
LYMPHOCYTES # BLD AUTO: 1.8 K/UL (ref 1–4.8)
LYMPHOCYTES NFR BLD: 36.6 % (ref 18–48)
MCH RBC QN AUTO: 29.4 PG (ref 27–31)
MCHC RBC AUTO-ENTMCNC: 31.8 G/DL (ref 32–36)
MCV RBC AUTO: 92 FL (ref 82–98)
MONOCYTES # BLD AUTO: 0.5 K/UL (ref 0.3–1)
MONOCYTES NFR BLD: 10 % (ref 4–15)
NEUTROPHILS # BLD AUTO: 2.5 K/UL (ref 1.8–7.7)
NEUTROPHILS NFR BLD: 51.6 % (ref 38–73)
NONHDLC SERPL-MCNC: 113 MG/DL
NRBC BLD-RTO: 0 /100 WBC
PLATELET # BLD AUTO: 235 K/UL (ref 150–450)
PMV BLD AUTO: 11 FL (ref 9.2–12.9)
POTASSIUM SERPL-SCNC: 4.7 MMOL/L (ref 3.5–5.1)
PROT SERPL-MCNC: 6.5 G/DL (ref 6–8.4)
RBC # BLD AUTO: 4.36 M/UL (ref 4.6–6.2)
SODIUM SERPL-SCNC: 140 MMOL/L (ref 136–145)
TRIGL SERPL-MCNC: 102 MG/DL (ref 30–150)
WBC # BLD AUTO: 4.78 K/UL (ref 3.9–12.7)

## 2022-05-09 PROCEDURE — 80053 COMPREHEN METABOLIC PANEL: CPT | Performed by: INTERNAL MEDICINE

## 2022-05-09 PROCEDURE — 85025 COMPLETE CBC W/AUTO DIFF WBC: CPT | Performed by: INTERNAL MEDICINE

## 2022-05-09 PROCEDURE — 36415 COLL VENOUS BLD VENIPUNCTURE: CPT | Mod: PO | Performed by: INTERNAL MEDICINE

## 2022-05-09 PROCEDURE — 84153 ASSAY OF PSA TOTAL: CPT | Performed by: INTERNAL MEDICINE

## 2022-05-09 PROCEDURE — 80061 LIPID PANEL: CPT | Performed by: INTERNAL MEDICINE

## 2022-05-10 NOTE — PROGRESS NOTES
Here are the results of your recent labs.  We will review them in detail at your follow up visit.    Sincerely,    Nigel Naranjo M.D.        If you would like to review your experience with Dr. Naranjo or [facility], please follow the link below:    [email or web address]

## 2022-05-11 PROBLEM — Z12.11 SCREENING FOR COLON CANCER: Status: RESOLVED | Noted: 2019-03-15 | Resolved: 2022-05-11

## 2022-05-12 ENCOUNTER — OFFICE VISIT (OUTPATIENT)
Dept: INTERNAL MEDICINE | Facility: CLINIC | Age: 52
End: 2022-05-12
Payer: OTHER GOVERNMENT

## 2022-05-12 VITALS
WEIGHT: 266.13 LBS | SYSTOLIC BLOOD PRESSURE: 130 MMHG | HEART RATE: 77 BPM | BODY MASS INDEX: 36.04 KG/M2 | HEIGHT: 72 IN | OXYGEN SATURATION: 97 % | DIASTOLIC BLOOD PRESSURE: 84 MMHG | TEMPERATURE: 98 F

## 2022-05-12 DIAGNOSIS — Z00.00 ROUTINE GENERAL MEDICAL EXAMINATION AT HEALTH CARE FACILITY: Primary | ICD-10-CM

## 2022-05-12 DIAGNOSIS — I10 PRIMARY HYPERTENSION: ICD-10-CM

## 2022-05-12 DIAGNOSIS — G47.33 OSA ON CPAP: Chronic | ICD-10-CM

## 2022-05-12 DIAGNOSIS — E78.2 MIXED HYPERLIPIDEMIA: ICD-10-CM

## 2022-05-12 DIAGNOSIS — D64.9 NORMOCYTIC ANEMIA: ICD-10-CM

## 2022-05-12 DIAGNOSIS — G89.29 CHRONIC LEFT-SIDED LOW BACK PAIN WITHOUT SCIATICA: ICD-10-CM

## 2022-05-12 DIAGNOSIS — M54.50 CHRONIC LEFT-SIDED LOW BACK PAIN WITHOUT SCIATICA: ICD-10-CM

## 2022-05-12 PROCEDURE — 99396 PREV VISIT EST AGE 40-64: CPT | Mod: S$PBB,,, | Performed by: INTERNAL MEDICINE

## 2022-05-12 PROCEDURE — 99215 OFFICE O/P EST HI 40 MIN: CPT | Mod: PBBFAC,PO | Performed by: INTERNAL MEDICINE

## 2022-05-12 PROCEDURE — 99999 PR PBB SHADOW E&M-EST. PATIENT-LVL V: CPT | Mod: PBBFAC,,, | Performed by: INTERNAL MEDICINE

## 2022-05-12 PROCEDURE — 99396 PR PREVENTIVE VISIT,EST,40-64: ICD-10-PCS | Mod: S$PBB,,, | Performed by: INTERNAL MEDICINE

## 2022-05-12 PROCEDURE — 99999 PR PBB SHADOW E&M-EST. PATIENT-LVL V: ICD-10-PCS | Mod: PBBFAC,,, | Performed by: INTERNAL MEDICINE

## 2022-05-12 RX ORDER — FINASTERIDE 1 MG/1
1 TABLET, FILM COATED ORAL DAILY
Qty: 90 TABLET | Refills: 3 | Status: SHIPPED | OUTPATIENT
Start: 2022-05-12 | End: 2023-05-15

## 2022-05-12 RX ORDER — CELECOXIB 200 MG/1
200 CAPSULE ORAL DAILY
Qty: 90 CAPSULE | Refills: 3 | Status: SHIPPED | OUTPATIENT
Start: 2022-05-12 | End: 2023-03-28

## 2022-05-12 RX ORDER — GABAPENTIN 300 MG/1
CAPSULE ORAL
COMMUNITY
Start: 2021-08-03 | End: 2022-05-12

## 2022-05-12 RX ORDER — METOPROLOL SUCCINATE 50 MG/1
50 TABLET, EXTENDED RELEASE ORAL DAILY
Qty: 90 TABLET | Refills: 3 | Status: SHIPPED | OUTPATIENT
Start: 2022-05-12 | End: 2023-09-10

## 2022-05-12 RX ORDER — CITALOPRAM 20 MG/1
20 TABLET, FILM COATED ORAL DAILY
Qty: 90 TABLET | Refills: 3 | Status: SHIPPED | OUTPATIENT
Start: 2022-05-12 | End: 2023-05-15

## 2022-05-12 RX ORDER — AMLODIPINE BESYLATE 5 MG/1
5 TABLET ORAL DAILY
Qty: 90 TABLET | Refills: 3 | Status: SHIPPED | OUTPATIENT
Start: 2022-05-12 | End: 2023-09-10

## 2022-05-12 NOTE — PROGRESS NOTES
Subjective:       Patient ID: Juan Bateman is a 51 y.o. male.    Chief Complaint: Annual Exam    HPI Patient is a 51-year-old male presenting today for updated physical exam review of chronic health issues.  Patient has history of hypertension, hyperlipidemia, sleep apnea chronic back pain and the mild normocytic anemia.  In regards to his chronic issues the blood pressure and cholesterol have remained under good control.  He is tolerating the medications without any adverse effects.  There were some changes to his blood pressure medications over the last year so.  He is no longer on losartan hydrochlorothiazide.  He is taking metoprolol and amlodipine.  No problems with that change have been noted.    He still struggles with sleep apnea.  He is having on trouble tolerating the CPAP device.  He notes lot of stress with work which also is leaving him struggling little bit with his sleep.  The bigger issue there is that he just is not able to sleep with the CPAP device.  He has tried a variety of masks through the years but it has not solved his problem.  We discussed the dental devices well as the inspire device.    He continues to follow with outside pain management for his chronic back issues.  He states this remains little bit of a struggle for him.  Sometimes shot seem to work well at the times not some much.  He continues to take Celebrex daily for his anti inflammation approach.    Lab work shows an ongoing issue with some mild normocytic anemia.  Colonoscopy done a couple years ago showed a single polyp but no other significant issues.  There is no evidence of GI bleeding.  Numbers have generally been stable over time.  No other symptoms are attributed to the anemia.    Review of Systems   Constitutional: Negative for fever and unexpected weight change.   HENT: Negative for hearing loss, postnasal drip and rhinorrhea.    Eyes: Negative for pain and visual disturbance.   Respiratory: Negative for  cough, shortness of breath and wheezing.    Cardiovascular: Negative for chest pain and palpitations.   Gastrointestinal: Negative for constipation, diarrhea, nausea and vomiting.   Genitourinary: Negative for dysuria and hematuria.   Musculoskeletal: Negative for arthralgias, back pain, myalgias and neck stiffness.   Skin: Negative for pallor and rash.   Neurological: Negative for seizures, syncope and headaches.   Hematological: Negative for adenopathy.   Psychiatric/Behavioral: Negative for dysphoric mood. The patient is not nervous/anxious.        Objective:   /84   Pulse 77   Temp 97.5 °F (36.4 °C) (Temporal)   Ht 6' (1.829 m)   Wt 120.7 kg (266 lb 1.5 oz)   SpO2 97%   BMI 36.09 kg/m²      Physical Exam  Vitals reviewed.   Constitutional:       General: He is not in acute distress.     Appearance: He is well-developed.   HENT:      Head: Normocephalic and atraumatic.      Right Ear: Tympanic membrane and ear canal normal.      Left Ear: Tympanic membrane and ear canal normal.   Eyes:      Pupils: Pupils are equal, round, and reactive to light.   Neck:      Thyroid: No thyromegaly.      Vascular: No JVD.   Cardiovascular:      Rate and Rhythm: Normal rate and regular rhythm.      Heart sounds: Normal heart sounds. No murmur heard.    No friction rub. No gallop.   Pulmonary:      Effort: Pulmonary effort is normal.      Breath sounds: Normal breath sounds. No wheezing or rales.   Abdominal:      General: Bowel sounds are normal. There is no distension.      Palpations: Abdomen is soft.      Tenderness: There is no abdominal tenderness. There is no guarding or rebound.   Musculoskeletal:         General: Normal range of motion.      Cervical back: Normal range of motion and neck supple.   Lymphadenopathy:      Cervical: No cervical adenopathy.   Skin:     General: Skin is warm and dry.      Findings: No rash.   Neurological:      General: No focal deficit present.      Mental Status: He is alert and  oriented to person, place, and time.      Cranial Nerves: No cranial nerve deficit.      Deep Tendon Reflexes: Reflexes are normal and symmetric.   Psychiatric:         Mood and Affect: Mood normal.         Judgment: Judgment normal.         Lab Visit on 05/09/2022   Component Date Value    Sodium 05/09/2022 140     Potassium 05/09/2022 4.7     Chloride 05/09/2022 104     CO2 05/09/2022 29     Glucose 05/09/2022 90     BUN 05/09/2022 13     Creatinine 05/09/2022 0.8     Calcium 05/09/2022 9.3     Total Protein 05/09/2022 6.5     Albumin 05/09/2022 4.1     Total Bilirubin 05/09/2022 0.6     Alkaline Phosphatase 05/09/2022 74     AST 05/09/2022 22     ALT 05/09/2022 28     Anion Gap 05/09/2022 7 (A)    eGFR if African American 05/09/2022 >60.0     eGFR if non African Amer* 05/09/2022 >60.0     Cholesterol 05/09/2022 164     Triglycerides 05/09/2022 102     HDL 05/09/2022 51     LDL Cholesterol 05/09/2022 92.6     HDL/Cholesterol Ratio 05/09/2022 31.1     Total Cholesterol/HDL Ra* 05/09/2022 3.2     Non-HDL Cholesterol 05/09/2022 113     WBC 05/09/2022 4.78     RBC 05/09/2022 4.36 (A)    Hemoglobin 05/09/2022 12.8 (A)    Hematocrit 05/09/2022 40.3     MCV 05/09/2022 92     MCH 05/09/2022 29.4     MCHC 05/09/2022 31.8 (A)    RDW 05/09/2022 13.1     Platelets 05/09/2022 235     MPV 05/09/2022 11.0     Immature Granulocytes 05/09/2022 0.2     Gran # (ANC) 05/09/2022 2.5     Immature Grans (Abs) 05/09/2022 0.01     Lymph # 05/09/2022 1.8     Mono # 05/09/2022 0.5     Eos # 05/09/2022 0.1     Baso # 05/09/2022 0.03     nRBC 05/09/2022 0     Gran % 05/09/2022 51.6     Lymph % 05/09/2022 36.6     Mono % 05/09/2022 10.0     Eosinophil % 05/09/2022 1.0     Basophil % 05/09/2022 0.6     Differential Method 05/09/2022 Automated     PSA, Screen 05/09/2022 0.33        Assessment:       1. Routine general medical examination at health care facility    2. Primary hypertension    3.  Mixed hyperlipidemia    4. SHAW on CPAP    5. Chronic left-sided low back pain without sciatica    6. Normocytic anemia        Plan:   HTN (hypertension)  Blood pressure is under good control.  We will continue the current regimen.  Will work on regular aerobic exercise and a low salt diet.        Hyperlipidemia  Cholesterol numbers look good.  We will continue the current regimen at this time.  Remain focused on low fat diet and high dietary fiber intake.      Routine general medical examination at health care facility    Primary hypertension    Mixed hyperlipidemia    SHAW on CPAP  Comments:  Can not tolerate cpap, consult sleep medicine  Orders:  -     Ambulatory referral/consult to Sleep Disorders; Future; Expected date: 05/19/2022    Chronic left-sided low back pain without sciatica  Comments:  celebrex 200 mg once daily, continue following with pain medicine  Orders:  -     celecoxib (CELEBREX) 200 MG capsule; Take 1 capsule (200 mg total) by mouth once daily.  Dispense: 90 capsule; Refill: 3    Normocytic anemia  Comments:  stable over time, no changes today.  start multi-vitamin.  repeat cbc in six months  Orders:  -     CBC Auto Differential; Future; Expected date: 11/08/2022    Other orders  -     metoprolol succinate (TOPROL-XL) 50 MG 24 hr tablet; Take 1 tablet (50 mg total) by mouth once daily.  Dispense: 90 tablet; Refill: 3  -     amLODIPine (NORVASC) 5 MG tablet; Take 1 tablet (5 mg total) by mouth once daily.  Dispense: 90 tablet; Refill: 3  -     citalopram (CELEXA) 20 MG tablet; Take 1 tablet (20 mg total) by mouth once daily.  Dispense: 90 tablet; Refill: 3  -     finasteride (PROPECIA) 1 mg tablet; Take 1 tablet (1 mg total) by mouth once daily.  Dispense: 90 tablet; Refill: 3          Follow up in about 1 year (around 5/12/2023).

## 2022-05-13 ENCOUNTER — PATIENT MESSAGE (OUTPATIENT)
Dept: PULMONOLOGY | Facility: CLINIC | Age: 52
End: 2022-05-13
Payer: OTHER GOVERNMENT

## 2022-05-16 ENCOUNTER — PATIENT MESSAGE (OUTPATIENT)
Dept: INTERNAL MEDICINE | Facility: CLINIC | Age: 52
End: 2022-05-16
Payer: OTHER GOVERNMENT

## 2022-05-16 RX ORDER — SILDENAFIL 50 MG/1
TABLET, FILM COATED ORAL
Refills: 0 | OUTPATIENT
Start: 2022-05-16

## 2022-05-16 NOTE — TELEPHONE ENCOUNTER
No new care gaps identified.  [facility] Embedded Care Gaps. Reference number: [id number]. 5/16/2022   6:39:03 PM CDT

## 2022-05-17 RX ORDER — ALPRAZOLAM 0.25 MG/1
TABLET ORAL 2 TIMES DAILY
COMMUNITY
End: 2022-05-17 | Stop reason: SDUPTHER

## 2022-05-17 RX ORDER — ALPRAZOLAM 0.25 MG/1
0.25 TABLET ORAL 2 TIMES DAILY PRN
Qty: 60 TABLET | Refills: 0 | Status: SHIPPED | OUTPATIENT
Start: 2022-05-17 | End: 2023-12-05 | Stop reason: SDUPTHER

## 2022-05-17 NOTE — TELEPHONE ENCOUNTER
No new care gaps identified.  [facility] Embedded Care Gaps. Reference number: [id number]. 5/17/2022   8:43:11 AM SARAHT

## 2022-05-31 ENCOUNTER — PATIENT MESSAGE (OUTPATIENT)
Dept: INTERNAL MEDICINE | Facility: CLINIC | Age: 52
End: 2022-05-31
Payer: OTHER GOVERNMENT

## 2022-05-31 DIAGNOSIS — K21.9 GASTROESOPHAGEAL REFLUX DISEASE, UNSPECIFIED WHETHER ESOPHAGITIS PRESENT: Primary | ICD-10-CM

## 2022-05-31 RX ORDER — OMEPRAZOLE 40 MG/1
40 CAPSULE, DELAYED RELEASE ORAL 2 TIMES DAILY
Qty: 60 CAPSULE | Refills: 11 | Status: SHIPPED | OUTPATIENT
Start: 2022-05-31 | End: 2023-04-21 | Stop reason: SDUPTHER

## 2022-06-20 ENCOUNTER — HOSPITAL ENCOUNTER (OUTPATIENT)
Dept: PREADMISSION TESTING | Facility: HOSPITAL | Age: 52
Discharge: HOME | End: 2022-06-20
Attending: INTERNAL MEDICINE
Payer: OTHER GOVERNMENT

## 2022-06-20 DIAGNOSIS — K21.9 GASTROESOPHAGEAL REFLUX DISEASE, UNSPECIFIED WHETHER ESOPHAGITIS PRESENT: Primary | ICD-10-CM

## 2022-06-21 ENCOUNTER — TELEPHONE (OUTPATIENT)
Dept: PREADMISSION TESTING | Facility: HOSPITAL | Age: 52
End: 2022-06-21
Payer: OTHER GOVERNMENT

## 2022-06-21 NOTE — PRE-PROCEDURE INSTRUCTIONS
PAT call completed.  Patient educated on procedure instructions.  Medical history discussed and patient informed of arrival time of 6:00 AM on Wednesday, June 29, 2022 at the [facility], and was made aware of the limited-visitor policy, and that  is to remain during the entire visit.  All questions and concerns addressed.  Endoscopy instructions reviewed. Instructed nothing to eat or drink after midnight the night before procedure.  Also instructed to only take blood pressure medications Amlodipine and Metoprolol the morning of procedure with just a few small sips of water.  Pre-procedure covid testing not needed, patient is covid vaccinated.  Patient verbalized understanding of all instructions.

## 2022-06-21 NOTE — TELEPHONE ENCOUNTER
PAT call completed.  Patient educated on procedure instructions.  Medical history discussed and patient informed of arrival time of 6:00 AM on Wednesday, June 29, 2022 at the [facility], and was made aware of the limited-visitor policy, and that  is to remain during the entire visit.  All questions and concerns addressed.  Endoscopy instructions reviewed. Instructed nothing to eat or drink after midnight the night before procedure.  Also instructed to only take blood pressure medications Amlodipine and Metoprolol the morning of procedure with just a few small sips of water.  Pre-procedure covid testing not needed, patient is covid vaccinated.  Patient verbalized understanding of all instructions.

## 2022-06-24 ENCOUNTER — ANESTHESIA EVENT (OUTPATIENT)
Dept: ENDOSCOPY | Facility: HOSPITAL | Age: 52
End: 2022-06-24
Payer: OTHER GOVERNMENT

## 2022-06-24 NOTE — ANESTHESIA PREPROCEDURE EVALUATION
06/24/2022  Juan Bateman is a 52 y.o., male.    Past Medical History:   Diagnosis Date    Erectile dysfunction     HTN (hypertension)     Hyperlipidemia     Normocytic anemia 5/12/2022    SHAW (obstructive sleep apnea)     PONV (postoperative nausea and vomiting)     Right knee meniscal tear 6/30/2016    Tear of medial meniscus of left knee 10/20/2015    Tear of meniscus of left knee 11/10/2015    Transient synovitis, right knee 6/30/2016     Past Surgical History:   Procedure Laterality Date    COLONOSCOPY N/A 3/15/2019    Procedure: COLONOSCOPY;  Surgeon: Philip Teague MD;  Location: [facility];  Service: Endoscopy;  Laterality: N/A;    eye surgery      trauma at age 16    KNEE SURGERY      left knee x 3     PCP Note 5/2022      HPI Patient is a 51-year-old male presenting today for updated physical exam review of chronic health issues.  Patient has history of hypertension, hyperlipidemia, sleep apnea chronic back pain and the mild normocytic anemia.  In regards to his chronic issues the blood pressure and cholesterol have remained under good control.  He is tolerating the medications without any adverse effects.  There were some changes to his blood pressure medications over the last year so.  He is no longer on losartan hydrochlorothiazide.  He is taking metoprolol and amlodipine.  No problems with that change have been noted.     He still struggles with sleep apnea.  He is having on trouble tolerating the CPAP device.  He notes lot of stress with work which also is leaving him struggling little bit with his sleep.  The bigger issue there is that he just is not able to sleep with the CPAP device.  He has tried a variety of masks through the years but it has not solved his problem.  We discussed the dental devices well as the inspire device.     He continues to follow with  outside pain management for his chronic back issues.  He states this remains little bit of a struggle for him.  Sometimes shot seem to work well at the times not some much.  He continues to take Celebrex daily for his anti inflammation approach.     Lab work shows an ongoing issue with some mild normocytic anemia.  Colonoscopy done a couple years ago showed a single polyp but no other significant issues.  There is no evidence of GI bleeding.  Numbers have generally been stable over time.  No other symptoms are attributed to the anemia.      Pre-op Assessment    I have reviewed the Patient Summary Reports.     I have reviewed the Nursing Notes. I have reviewed the NPO Status.   I have reviewed the Medications.     Review of Systems  Anesthesia Hx:  Hx of Anesthetic complications PONV with only one surgery Denies Family Hx of Anesthesia complications.   Denies Personal Hx of Anesthesia complications.   Social:  Non-Smoker, Social Alcohol Use    Hematology/Oncology:  Hematology Normal   Oncology Normal     EENT/Dental:EENT/Dental Normal   Cardiovascular:   Hypertension hyperlipidemia    Pulmonary:   Sleep Apnea Noncompliant w cpap    Education provided regarding risk of obstructive sleep apnea     Renal/:  Renal/ Normal     Hepatic/GI:   GERD    Musculoskeletal:   Arthritis     Neurological:  Neurology Normal    Endocrine:  Obesity / BMI > 30  Dermatological:  Skin Normal    Psych:  Psychiatric Normal           Physical Exam  General: Well nourished, Cooperative, Alert and Oriented    Airway:  Mallampati: III / III  Mouth Opening: Small, but > 3cm  TM Distance: Normal  Tongue: Normal  Neck ROM: Normal ROM    Dental:  Intact    Chest/Lungs:  Clear to auscultation, Normal Respiratory Rate    Heart:  Rate: Normal  Rhythm: Regular Rhythm        Anesthesia Plan  Type of Anesthesia, risks & benefits discussed:    Anesthesia Type: Gen Natural Airway  Intra-op Monitoring Plan: Standard ASA Monitors  Post Op Pain Control  Plan: multimodal analgesia  Induction:  IV  Informed Consent: Informed consent signed with the Patient and all parties understand the risks and agree with anesthesia plan.  All questions answered. Patient consented to blood products? No  ASA Score: 2  Day of Surgery Review of History & Physical: H&P Update referred to the surgeon/provider.    Ready For Surgery From Anesthesia Perspective.     .

## 2022-06-28 ENCOUNTER — ANESTHESIA (OUTPATIENT)
Dept: ENDOSCOPY | Facility: HOSPITAL | Age: 52
End: 2022-06-28
Payer: OTHER GOVERNMENT

## 2022-06-28 ENCOUNTER — HOSPITAL ENCOUNTER (OUTPATIENT)
Facility: HOSPITAL | Age: 52
Discharge: HOME | End: 2022-06-28
Attending: INTERNAL MEDICINE | Admitting: INTERNAL MEDICINE
Payer: OTHER GOVERNMENT

## 2022-06-28 VITALS
BODY MASS INDEX: 35.78 KG/M2 | SYSTOLIC BLOOD PRESSURE: 127 MMHG | RESPIRATION RATE: 16 BRPM | WEIGHT: 264.13 LBS | HEIGHT: 72 IN | OXYGEN SATURATION: 97 % | TEMPERATURE: 98 F | DIASTOLIC BLOOD PRESSURE: 88 MMHG | HEART RATE: 70 BPM

## 2022-06-28 DIAGNOSIS — K21.9 GASTROESOPHAGEAL REFLUX DISEASE, UNSPECIFIED WHETHER ESOPHAGITIS PRESENT: Primary | ICD-10-CM

## 2022-06-28 PROCEDURE — D9220A PRA ANESTHESIA: ICD-10-PCS | Mod: ANES,,, | Performed by: ANESTHESIOLOGY

## 2022-06-28 PROCEDURE — 27201012 HC FORCEPS, HOT/COLD, DISP: Performed by: INTERNAL MEDICINE

## 2022-06-28 PROCEDURE — 88312 SPECIAL STAINS GROUP 1: CPT | Performed by: PATHOLOGY

## 2022-06-28 PROCEDURE — 37000008 HC ANESTHESIA 1ST 15 MINUTES: Performed by: INTERNAL MEDICINE

## 2022-06-28 PROCEDURE — D9220A PRA ANESTHESIA: Mod: CRNA,,, | Performed by: NURSE ANESTHETIST, CERTIFIED REGISTERED

## 2022-06-28 PROCEDURE — 43239 PR EGD, FLEX, W/BIOPSY, SGL/MULTI: ICD-10-PCS | Mod: ,,, | Performed by: INTERNAL MEDICINE

## 2022-06-28 PROCEDURE — 43239 EGD BIOPSY SINGLE/MULTIPLE: CPT | Performed by: INTERNAL MEDICINE

## 2022-06-28 PROCEDURE — 88305 TISSUE EXAM BY PATHOLOGIST: ICD-10-PCS | Mod: 26,,, | Performed by: PATHOLOGY

## 2022-06-28 PROCEDURE — D9220A PRA ANESTHESIA: ICD-10-PCS | Mod: CRNA,,, | Performed by: NURSE ANESTHETIST, CERTIFIED REGISTERED

## 2022-06-28 PROCEDURE — 88312 PR  SPECIAL STAINS,GROUP I: ICD-10-PCS | Mod: 26,,, | Performed by: PATHOLOGY

## 2022-06-28 PROCEDURE — 63600175 PHARM REV CODE 636 W HCPCS: Performed by: INTERNAL MEDICINE

## 2022-06-28 PROCEDURE — 88305 TISSUE EXAM BY PATHOLOGIST: CPT | Mod: 26,,, | Performed by: PATHOLOGY

## 2022-06-28 PROCEDURE — 63600175 PHARM REV CODE 636 W HCPCS: Performed by: NURSE ANESTHETIST, CERTIFIED REGISTERED

## 2022-06-28 PROCEDURE — 88305 TISSUE EXAM BY PATHOLOGIST: CPT | Performed by: PATHOLOGY

## 2022-06-28 PROCEDURE — 43239 EGD BIOPSY SINGLE/MULTIPLE: CPT | Mod: ,,, | Performed by: INTERNAL MEDICINE

## 2022-06-28 PROCEDURE — 25000003 PHARM REV CODE 250: Performed by: NURSE ANESTHETIST, CERTIFIED REGISTERED

## 2022-06-28 PROCEDURE — 37000009 HC ANESTHESIA EA ADD 15 MINS: Performed by: INTERNAL MEDICINE

## 2022-06-28 PROCEDURE — D9220A PRA ANESTHESIA: Mod: ANES,,, | Performed by: ANESTHESIOLOGY

## 2022-06-28 PROCEDURE — 88312 SPECIAL STAINS GROUP 1: CPT | Mod: 26,,, | Performed by: PATHOLOGY

## 2022-06-28 RX ORDER — PROPOFOL 10 MG/ML
VIAL (ML) INTRAVENOUS
Status: DISCONTINUED | OUTPATIENT
Start: 2022-06-28 | End: 2022-06-28

## 2022-06-28 RX ORDER — LIDOCAINE HYDROCHLORIDE 20 MG/ML
INJECTION, SOLUTION EPIDURAL; INFILTRATION; INTRACAUDAL; PERINEURAL
Status: DISCONTINUED | OUTPATIENT
Start: 2022-06-28 | End: 2022-06-28

## 2022-06-28 RX ORDER — SODIUM CHLORIDE, SODIUM LACTATE, POTASSIUM CHLORIDE, CALCIUM CHLORIDE 600; 310; 30; 20 MG/100ML; MG/100ML; MG/100ML; MG/100ML
INJECTION, SOLUTION INTRAVENOUS CONTINUOUS
Status: ACTIVE | OUTPATIENT
Start: 2022-06-28

## 2022-06-28 RX ORDER — FENTANYL CITRATE 50 UG/ML
INJECTION, SOLUTION INTRAMUSCULAR; INTRAVENOUS
Status: DISCONTINUED | OUTPATIENT
Start: 2022-06-28 | End: 2022-06-28

## 2022-06-28 RX ADMIN — GLYCOPYRROLATE 0.2 MG: 0.2 INJECTION, SOLUTION INTRAMUSCULAR; INTRAVITREAL at 06:06

## 2022-06-28 RX ADMIN — PROPOFOL 20 MG: 10 INJECTION, EMULSION INTRAVENOUS at 06:06

## 2022-06-28 RX ADMIN — SODIUM CHLORIDE, SODIUM LACTATE, POTASSIUM CHLORIDE, AND CALCIUM CHLORIDE: 600; 310; 30; 20 INJECTION, SOLUTION INTRAVENOUS at 06:06

## 2022-06-28 RX ADMIN — PROPOFOL 150 MG: 10 INJECTION, EMULSION INTRAVENOUS at 06:06

## 2022-06-28 RX ADMIN — FENTANYL CITRATE 50 MCG: 50 INJECTION, SOLUTION INTRAMUSCULAR; INTRAVENOUS at 06:06

## 2022-06-28 RX ADMIN — LIDOCAINE HYDROCHLORIDE 100 MG: 20 INJECTION, SOLUTION EPIDURAL; INFILTRATION; INTRACAUDAL; PERINEURAL at 06:06

## 2022-06-28 NOTE — H&P
PRE PROCEDURE H&P    Patient Name: Juan Bateman  MRN: [id number]  : 1970  Date of Procedure:  2022  Referring Physician: Nigel Naranjo MD  Primary Physician: Nigel Naranjo MD  Procedure Physician: Rama Montejo MD       Planned Procedure: EGD  Diagnosis: GERD  Chief Complaint: Same as above    HPI: Patient is an 52 y.o. male is here for the above.       Past Medical History:   Past Medical History:   Diagnosis Date    Erectile dysfunction     HTN (hypertension)     Hyperlipidemia     Normocytic anemia 2022    SHAW (obstructive sleep apnea)     PONV (postoperative nausea and vomiting)     Right knee meniscal tear 2016    Tear of medial meniscus of left knee 10/20/2015    Tear of meniscus of left knee 11/10/2015    Transient synovitis, right knee 2016        Past Surgical History:  Past Surgical History:   Procedure Laterality Date    COLONOSCOPY N/A 3/15/2019    Procedure: COLONOSCOPY;  Surgeon: Philip Teague MD;  Location: [facility];  Service: Endoscopy;  Laterality: N/A;    eye surgery      trauma at age 16    KNEE SURGERY      left knee x 3        Home Medications:  Prior to Admission medications    Medication Sig Start Date End Date Taking? Authorizing Provider   ALPRAZolam (XANAX) 0.25 MG tablet Take 1 tablet (0.25 mg total) by mouth 2 (two) times daily as needed for Anxiety. 22  Yes Nigel Naranjo MD   amLODIPine (NORVASC) 5 MG tablet Take 1 tablet (5 mg total) by mouth once daily. 22  Yes Nigel Naranjo MD   aspirin 81 mg Tab Take 1 tablet by mouth Daily. 12  Yes Historical Provider   atorvastatin (LIPITOR) 20 MG tablet Take 1 tablet (20 mg total) by mouth once daily. 21  Yes Nigel Naranjo MD   celecoxib (CELEBREX) 200 MG capsule Take 1 capsule (200 mg total) by mouth once daily. 22  Yes Nigel Naranjo MD   metoprolol succinate (TOPROL-XL) 50 MG 24 hr tablet Take 1 tablet (50 mg total) by mouth once daily.  5/12/22  Yes Nigel Naranjo MD   omeprazole (PRILOSEC) 40 MG capsule Take 1 capsule (40 mg total) by mouth 2 (two) times a day. 5/31/22  Yes Nigel Naranjo MD   citalopram (CELEXA) 20 MG tablet Take 1 tablet (20 mg total) by mouth once daily. 5/12/22   Nigel Naranjo MD   finasteride (PROPECIA) 1 mg tablet Take 1 tablet (1 mg total) by mouth once daily. 5/12/22   Nigel Naranjo MD   latanoprost 0.005 % ophthalmic solution  7/10/19   Historical Provider   sildenafiL (VIAGRA) 50 MG tablet TAKE 1 TO 2 TABLETS 20 MINUTES PRIOR TO SEXUAL ACTIVITY 6/22/20   Nigel Naranjo MD        Allergies:  Review of patient's allergies indicates:  No Known Allergies     Social History:   Social History     Socioeconomic History    Marital status:     Number of children: 2   Occupational History    Occupation: Technical Machine     Employer: [employer]    Occupation: retired from [employer]   Tobacco Use    Smoking status: Never Smoker    Smokeless tobacco: Never Used   Substance and Sexual Activity    Alcohol use: Yes     Comment: social    Drug use: No    Sexual activity: Yes       Family History:  Family History   Problem Relation Age of Onset    Diabetes Mother     Heart disease Mother     Hodgkin's lymphoma Mother         non hodgkins    Diabetes Father        ROS: No acute cardiac events, no acute respiratory complaints.     Physical Exam (all patients):    BP (!) 139/96 (BP Location: Left arm, Patient Position: Sitting) Comment: didn't take his BP meds this am   Pulse 71   Temp 97.5 °F (36.4 °C) (Temporal)   Resp 16   Ht 6' (1.829 m)   Wt 119.8 kg (264 lb 1.8 oz)   SpO2 96%   BMI 35.82 kg/m²   Lungs: Clear to auscultation bilaterally, respirations unlabored  Heart: Regular rate and rhythm, S1 and S2 normal, no obvious murmurs  Abdomen:         Soft, non-tender, bowel sounds normal, no masses, no organomegaly    Lab Results   Component Value Date    WBC 4.78 05/09/2022    MCV 92 05/09/2022    RDW  13.1 05/09/2022     05/09/2022    GLU 90 05/09/2022    BUN 13 05/09/2022     05/09/2022    K 4.7 05/09/2022     05/09/2022        SEDATION PLAN: per anesthesia      History reviewed, vital signs satisfactory, cardiopulmonary status satisfactory, sedation options, risks and plans have been discussed with the patient  All their questions were answered and the patient agrees to the sedation procedures as planned and the patient is deemed an appropriate candidate for the sedation as planned.    Procedure explained to patient, informed consent obtained and placed in chart.    Rama Montejo  6/28/2022  6:49 AM

## 2022-06-28 NOTE — PROVATION PATIENT INSTRUCTIONS
Discharge Summary/Instructions after an Endoscopic Procedure  Patient Name: Juan Bateman  Patient MRN: [id number]  Patient YOB: 1970 Tuesday, June 28, 2022  Rama Montejo MD  Dear patient,  As a result of recent federal legislation (The Federal Cures Act), you may   receive lab or pathology results from your procedure in your [facility]   account before your physician is able to contact you. Your physician or   their representative will relay the results to you with their   recommendations at their soonest availability.  Thank you,  RESTRICTIONS:  During your procedure today, you received medications for sedation.  These   medications may affect your judgment, balance and coordination.  Therefore,   for 24 hours, you have the following restrictions:   - DO NOT drive a car, operate machinery, make legal/financial decisions,   sign important papers or drink alcohol.    ACTIVITY:  Today: no heavy lifting, straining or running due to procedural   sedation/anesthesia.  The following day: return to full activity including work.  DIET:  Eat and drink normally unless instructed otherwise.     TREATMENT FOR COMMON SIDE EFFECTS:  - Mild abdominal pain, nausea, belching, bloating or excessive gas:  rest,   eat lightly and use a heating pad.  - Sore Throat: treat with throat lozenges and/or gargle with warm salt   water.  - Because air was used during the procedure, expelling large amounts of air   from your rectum or belching is normal.  - If a bowel prep was taken, you may not have a bowel movement for 1-3 days.    This is normal.  SYMPTOMS TO WATCH FOR AND REPORT TO YOUR PHYSICIAN:  1. Abdominal pain or bloating, other than gas cramps.  2. Chest pain.  3. Back pain.  4. Signs of infection such as: chills or fever occurring within 24 hours   after the procedure.  5. Rectal bleeding, which would show as bright red, maroon, or black stools.   (A tablespoon of blood from the rectum is not serious, especially  if   hemorrhoids are present.)  6. Vomiting.  7. Weakness or dizziness.  GO DIRECTLY TO THE NEAREST EMERGENCY ROOM IF YOU HAVE ANY OF THE FOLLOWING:      Difficulty breathing              Chills and/or fever over 101 F   Persistent vomiting and/or vomiting blood   Severe abdominal pain   Severe chest pain   Black, tarry stools   Bleeding- more than one tablespoon   Any other symptom or condition that you feel may need urgent attention  Your doctor recommends these additional instructions:  If any biopsies were taken, your doctors clinic will contact you in 1 to 2   weeks with any results.  - Patient has a contact number available for emergencies.  The signs and   symptoms of potential delayed complications were discussed with the   patient.  Return to normal activities tomorrow.  Written discharge   instructions were provided to the patient.   - Discharge patient to home (via wheelchair).   - Resume previous diet today.   - Continue present medications.   - Await pathology results.  For questions, problems or results please call your physician Rama Montejo MD at Work:  [phone]  If you have any questions about the above instructions, call the GI   department at [phone] or call the endoscopy unit at [phone]   from 7am until 3 pm.  [facility] [place], EMERGENCY ROOM PHONE NUMBER:   [phone]  IF A COMPLICATION OR EMERGENCY SITUATION ARISES AND YOU ARE UNABLE TO REACH   YOUR PHYSICIAN - GO DIRECTLY TO THE EMERGENCY ROOM.  I have read or have had read to me these discharge instructions for my   procedure and have received a written copy.  I understand these   instructions and will follow-up with my physician if I have any questions.     __________________________________       _____________________________________  Nurse Signature                                          Patient/Designated   Responsible Party Signature  MD Rama Hua MD  6/28/2022  7:04:07 AM  This report has been verified and signed electronically.  Dear patient,  As a result of recent federal legislation (The Federal Cures Act), you may   receive lab or pathology results from your procedure in your [facility]   account before your physician is able to contact you. Your physician or   their representative will relay the results to you with their   recommendations at their soonest availability.  Thank you,  PROVATION

## 2022-06-28 NOTE — PLAN OF CARE
Discharge instructions reviewed with patient and visitor. Handouts given & verbalized understanding with no further questions at this time. Dr Montejo spoke to pt at bedside, reviewed procedure and findings, answered questions. Made aware they are awaiting biopsy results with MD telephone number provided per AVS sheet. VSS on RA, no pain or nausea noted, tolerating po fluids, no complaints noted. Fall precautions reviewed, consents in chart, PIV to be removed at discharge.

## 2022-06-28 NOTE — ANESTHESIA POSTPROCEDURE EVALUATION
Anesthesia Post Evaluation    Patient: Juan Bateman    Procedure(s) Performed: Procedure(s) (LRB):  EGD (ESOPHAGOGASTRODUODENOSCOPY) (N/A)    Final Anesthesia Type: general      Patient location during evaluation: PACU  Patient participation: Yes- Able to Participate  Level of consciousness: awake and alert and oriented  Post-procedure vital signs: reviewed and stable  Pain management: adequate  Airway patency: patent    PONV status at discharge: No PONV  Anesthetic complications: no      Cardiovascular status: blood pressure returned to baseline, stable and hemodynamically stable  Respiratory status: unassisted  Hydration status: euvolemic  Follow-up not needed.          Vitals Value Taken Time   /88 06/28/22 0735   Temp 36.4 °C (97.6 °F) 06/28/22 0706   Pulse 70 06/28/22 0735   Resp 16 06/28/22 0735   SpO2 97 % 06/28/22 0735         Event Time   Out of Recovery 07:36:00         Pain/Paul Score: Paul Score: 10 (6/28/2022  7:35 AM)

## 2022-06-28 NOTE — TRANSFER OF CARE
Anesthesia Transfer of Care Note    Patient: Juan Bateman    Procedure(s) Performed: Procedure(s) (LRB):  EGD (ESOPHAGOGASTRODUODENOSCOPY) (N/A)    Patient location: PACU    Anesthesia Type: general and MAC    Transport from OR: Transported from OR on room air with adequate spontaneous ventilation    Post pain: adequate analgesia    Post assessment: no apparent anesthetic complications    Post vital signs: stable    Level of consciousness: awake    Nausea/Vomiting: no nausea/vomiting    Complications: none    Transfer of care protocol was followed      Last vitals:   Visit Vitals  /83 (BP Location: Left arm, Patient Position: Lying)   Pulse 74   Temp 36.4 °C (97.6 °F) (Temporal)   Resp 12   Ht 6' (1.829 m)   Wt 119.8 kg (264 lb 1.8 oz)   SpO2 95%   BMI 35.82 kg/m²

## 2022-07-07 LAB
FINAL PATHOLOGIC DIAGNOSIS: NORMAL
GROSS: NORMAL
Lab: NORMAL

## 2022-08-20 RX ORDER — OMEPRAZOLE 40 MG/1
CAPSULE, DELAYED RELEASE ORAL
Refills: 0 | OUTPATIENT
Start: 2022-08-20

## 2022-08-20 NOTE — TELEPHONE ENCOUNTER
No new care gaps identified.  [facility] Embedded Care Gaps. Reference number: [id number]. 8/20/2022   9:20:12 AM CDT

## 2022-08-20 NOTE — TELEPHONE ENCOUNTER
[facility] Note  Quick DC. Inappropriate Request   Refill request requires further review by MD: NO   Medication Therapy Plan: Pharmacy is requesting new script(s) for the following medications without required information, (sig/ frequency/qty/etc)     [facility] action(s):  Quick Discontinue      Duplicate Pended Encounter(s)/ Last Prescribed Details:    Pharmacies have been requesting medications for patients without required information, (sig, frequency, qty, etc.). In addition, requests are sent for medication(s) pt. are currently not taking, and medications patients have never taken.    We have spoken to the pharmacies about these request types and advised their teams previously that we are unable to assess these New Script requests and require all details for these requests. This is a known issue and has been reported.        Medication related problems are not assessed for QDC.   Medication Reconciliation Completed? NO Were there pending details that required adjustment? NO     Automatic Epic Generated Protocol Data Below:   Requested Prescriptions   Pending Prescriptions Disp Refills    omeprazole (PRILOSEC) 40 MG capsule [Pharmacy Med Name: OMEPRAZOLE DR CAPS 40MG]  0              Appointments      Date Provider   Last Visit   5/12/2022 Nigel Naranjo MD   Next Visit   Visit date not found Nigel Naranjo MD        Note composed:6:30 PM 08/20/2022

## 2022-10-07 ENCOUNTER — OFFICE VISIT (OUTPATIENT)
Dept: PULMONOLOGY | Facility: CLINIC | Age: 52
End: 2022-10-07
Payer: OTHER GOVERNMENT

## 2022-10-07 VITALS
HEIGHT: 72 IN | SYSTOLIC BLOOD PRESSURE: 110 MMHG | WEIGHT: 270.75 LBS | RESPIRATION RATE: 16 BRPM | BODY MASS INDEX: 36.67 KG/M2 | DIASTOLIC BLOOD PRESSURE: 86 MMHG | HEART RATE: 90 BPM | OXYGEN SATURATION: 96 %

## 2022-10-07 DIAGNOSIS — Z86.69 HISTORY OF OBSTRUCTIVE SLEEP APNEA: Primary | ICD-10-CM

## 2022-10-07 DIAGNOSIS — G47.33 OBSTRUCTIVE SLEEP APNEA: ICD-10-CM

## 2022-10-07 DIAGNOSIS — G47.30 SLEEP-DISORDERED BREATHING: ICD-10-CM

## 2022-10-07 DIAGNOSIS — E66.01 CLASS 2 SEVERE OBESITY DUE TO EXCESS CALORIES WITH SERIOUS COMORBIDITY AND BODY MASS INDEX (BMI) OF 36.0 TO 36.9 IN ADULT: ICD-10-CM

## 2022-10-07 PROBLEM — E66.812 CLASS 2 SEVERE OBESITY DUE TO EXCESS CALORIES WITH SERIOUS COMORBIDITY AND BODY MASS INDEX (BMI) OF 36.0 TO 36.9 IN ADULT: Status: ACTIVE | Noted: 2022-10-07

## 2022-10-07 PROCEDURE — 99214 OFFICE O/P EST MOD 30 MIN: CPT | Mod: PBBFAC | Performed by: NURSE PRACTITIONER

## 2022-10-07 PROCEDURE — 99999 PR PBB SHADOW E&M-EST. PATIENT-LVL IV: CPT | Mod: PBBFAC,,, | Performed by: NURSE PRACTITIONER

## 2022-10-07 PROCEDURE — 99203 OFFICE O/P NEW LOW 30 MIN: CPT | Mod: S$PBB,,, | Performed by: NURSE PRACTITIONER

## 2022-10-07 PROCEDURE — 99203 PR OFFICE/OUTPT VISIT, NEW, LEVL III, 30-44 MIN: ICD-10-PCS | Mod: S$PBB,,, | Performed by: NURSE PRACTITIONER

## 2022-10-07 PROCEDURE — 99999 PR PBB SHADOW E&M-EST. PATIENT-LVL IV: ICD-10-PCS | Mod: PBBFAC,,, | Performed by: NURSE PRACTITIONER

## 2022-10-07 NOTE — PROGRESS NOTES
Subjective:      Patient ID: Juan Bateman is a 52 y.o. male.    Patient Active Problem List   Diagnosis    HTN (hypertension)    Hyperlipidemia    Erectile dysfunction    Obstructive sleep apnea    Chondromalacia of left knee    Family history of arteriosclerotic cardiovascular disease    Normocytic anemia    Chronic left-sided low back pain without sciatica    Class 2 severe obesity due to excess calories with serious comorbidity and body mass index (BMI) of 36.0 to 36.9 in adult       he has been referred by Nigel Naranjo MD for evaluation and management for   Chief Complaint   Patient presents with    Sleep Apnea         Chief Complaint: Sleep Apnea      HPI:  He presents for a sleep evaluation.     Patient had prior diagnosis of obstructive sleep apnea   Diagnosis initially when in [employer] duty in 2008 [place], [place] PSG AHI 29.0 prescribed CPAP 8 cm (near wore CPAP, felt intolerant).  8/25/2015 PSG split AHI 26.4, CPAP 14 cm optimal.    2008 [employer]     Patient has observed snoring, periods of not breathing.  Patient reports non restful sleep.  He denies morning headache.   He denies day time napping.  He weight gain 25 lbs since 2019.   Cardiovascular risk factors: hypertension, hyperlipidemia, and obesity  Bed time is 0930  Wake time is 0500  Sleep onset is within  30 - 60 Minutes.  Sleep maintenance difficulties related to non-restful sleep  Wake after sleep onset occurs one time a night about 3 am and 2-3 days trouble going back to sleep.  Nocturia occurs none   Sleep aids : YES, melatonin, zquil if thinking too much for sleep onset and awakenings. With benefit  Dry mouth :  NO  Sleep walking:  NO  Sleep talking :  NO  Sleep eating: NO  Vivid Dreams :  NO  Cataplexy :  NO    [place] Sleepiness Scale   EPWORTH SLEEPINESS SCALE 10/4/2022 12/31/2015 12/1/2015 10/26/2015 7/30/2015   Sitting and reading 2 2 2 2 3   Watching TV 1 1 1 1 2   Sitting, inactive in a public  place (e.g. a theatre or a meeting) 1 0 1 0 0   As a passenger in a car for an hour without a break 0 0 0 0 3   Lying down to rest in the afternoon when circumstances permit 2 1 1 2 1   Sitting and talking to someone 0 0 0 0 0   Sitting quietly after a lunch without alcohol 1 1 0 1 3   In a car, while stopped for a few minutes in traffic 0 1 1 1 2   Total score 7 6 6 7 14       Neck circumference is 48 cm. (19 inches).  Mallampati score 2    STOP - BANG Questionnaire:   1. Snoring : Do you snore loudly ?     YES  2. Tired : Do you often feel tired, fatigued, or sleepy during daytime?   YES  3. Observed: Has anyone observed you stop breathing during your sleep?     YES  4. Blood pressure : Do you have or are you being treated for high blood pressure?    YES  5. BMI :BMI more than 35 kg/m2?    YES  6. Age : Age over 50 yr old?    YES  7. Neck circumference: Neck circumference greater than 40 cm?    YES  8. Gender: Gender male?    YES    SCORE: 8    High risk of SHAW: Yes 5 - 8  Intermediate risk of SHAW: Yes 3 - 4  Low risk of SHAW: Yes 0 - 2    Previous Report Reviewed: lab reports and office notes     Past Medical History: The following portions of the patient's history were reviewed and updated as appropriate:   He  has a past surgical history that includes Knee surgery; eye surgery; Colonoscopy (N/A, 3/15/2019); and Esophagogastroduodenoscopy (N/A, 6/28/2022).  His family history includes Diabetes in his father and mother; Heart disease in his mother; Hodgkin's lymphoma in his mother.  He  reports that he has never smoked. He has never used smokeless tobacco. He reports current alcohol use. He reports that he does not use drugs.  He has a current medication list which includes the following prescription(s): alprazolam, amlodipine, aspirin, atorvastatin, celecoxib, citalopram, finasteride, latanoprost, metoprolol succinate, omeprazole, and sildenafil, and the following Facility-Administered Medications: lactated  ringers.  He has No Known Allergies..    Review of Systems   Constitutional:  Negative for fever, chills, weight loss, weight gain, activity change, appetite change, fatigue and night sweats.   HENT:  Negative for postnasal drip, rhinorrhea, sinus pressure, voice change and congestion.    Eyes:  Negative for redness and itching.   Respiratory:  Positive for apnea and snoring. Negative for cough, sputum production, chest tightness, shortness of breath, wheezing, orthopnea, asthma nighttime symptoms, dyspnea on extertion, use of rescue inhaler and somnolence.    Cardiovascular: Negative.  Negative for chest pain, palpitations and leg swelling.   Genitourinary:  Negative for difficulty urinating and hematuria.   Endocrine:  Negative for cold intolerance and heat intolerance.    Musculoskeletal:  Negative for arthralgias, gait problem, joint swelling and myalgias.   Skin: Negative.    Gastrointestinal:  Negative for nausea, vomiting, abdominal pain and acid reflux.   Neurological:  Negative for dizziness, weakness, light-headedness and headaches.   Hematological:  Negative for adenopathy. No excessive bruising.   All other systems reviewed and are negative.   Objective:   /86   Pulse 90   Resp 16   Ht 6' (1.829 m)   Wt 122.8 kg (270 lb 11.6 oz)   SpO2 96%   BMI 36.72 kg/m²   Physical Exam  Vitals and nursing note reviewed.   Constitutional:       General: He is not in acute distress.     Appearance: He is well-developed. He is not ill-appearing or toxic-appearing.   HENT:      Head: Normocephalic and atraumatic.      Right Ear: External ear normal.      Left Ear: External ear normal.      Nose: Nose normal.      Mouth/Throat:      Pharynx: No oropharyngeal exudate.   Eyes:      Conjunctiva/sclera: Conjunctivae normal.   Cardiovascular:      Rate and Rhythm: Normal rate and regular rhythm.      Heart sounds: Normal heart sounds.   Pulmonary:      Effort: Pulmonary effort is normal.      Breath sounds: Normal  breath sounds.   Abdominal:      Palpations: Abdomen is soft.   Musculoskeletal:      Cervical back: Normal range of motion and neck supple.   Skin:     General: Skin is warm and dry.   Neurological:      Mental Status: He is alert and oriented to person, place, and time.   Psychiatric:         Behavior: Behavior normal. Behavior is cooperative.         Thought Content: Thought content normal.         Judgment: Judgment normal.       Personal Diagnostic Review  Review of labs, xray's, cardiology reports.     Assessment:     1. Obstructive sleep apnea    2. Class 2 severe obesity due to excess calories with serious comorbidity and body mass index (BMI) of 36.0 to 36.9 in adult    3. Sleep-disordered breathing      No orders of the defined types were placed in this encounter.    Plan:   Discussed diagnosis, its evaluation, treatment and usual course. All questions answered.  Problem List Items Addressed This Visit       Obstructive sleep apnea - Primary    Class 2 severe obesity due to excess calories with serious comorbidity and body mass index (BMI) of 36.0 to 36.9 in adult     Encouraged calorie reduction and 30 minutes of exercise daily. Discussed impact of obesity on general health.  Back pain keeping from walking program, see pain mgmt for injections, Dr. Stephenson at [facility]  Wt Readings from Last 9 Encounters:   10/07/22 122.8 kg (270 lb 11.6 oz)   06/28/22 119.8 kg (264 lb 1.8 oz)   05/12/22 120.7 kg (266 lb 1.5 oz)   07/13/21 120.7 kg (266 lb)   03/22/21 120.9 kg (266 lb 8.6 oz)   02/22/21 122.3 kg (269 lb 10 oz)   09/20/19 118.3 kg (260 lb 12.9 oz)   09/11/19 116.6 kg (257 lb 0.9 oz)   08/02/19 115.8 kg (255 lb 4.7 oz)   Body mass index is 36.72 kg/m².              Other Visit Diagnoses       Sleep-disordered breathing              I spent a total of 36 minutes on the day of the visit.  This includes face to face time and non-face to face time preparing to see the patient (eg, review of tests), obtaining  and/or reviewing separately obtained history, documenting clinical information in the electronic or other health record, independently interpreting results and communicating results to the patient/family/caregiver, or care coordinator.    Follow up for Sleep Study results review before ordering PAP therapy.    Thank you for the opportunity to participate in the care of this patient.

## 2022-10-07 NOTE — ASSESSMENT & PLAN NOTE
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  8/25/2015 PSG split AHI 26.4, CPAP 14 cm optimal.  Patient only willing for Home Sleep Study to re-establish diagnosis.  He will consider CPAP titration since intolerant to CPAP in past and needed high pressures to control obstructive sleep apnea and unable to breathe against cpap pressure. Patient may need BIPAP   Follow up in office for review of studies before ordering PAP therapy

## 2022-10-07 NOTE — ASSESSMENT & PLAN NOTE
Encouraged calorie reduction and 30 minutes of exercise daily. Discussed impact of obesity on general health.  Back pain keeping from walking program, see pain mgmt for injections, Dr. Stephenson at [facility]  Wt Readings from Last 9 Encounters:   10/07/22 122.8 kg (270 lb 11.6 oz)   06/28/22 119.8 kg (264 lb 1.8 oz)   05/12/22 120.7 kg (266 lb 1.5 oz)   07/13/21 120.7 kg (266 lb)   03/22/21 120.9 kg (266 lb 8.6 oz)   02/22/21 122.3 kg (269 lb 10 oz)   09/20/19 118.3 kg (260 lb 12.9 oz)   09/11/19 116.6 kg (257 lb 0.9 oz)   08/02/19 115.8 kg (255 lb 4.7 oz)   Body mass index is 36.72 kg/m².

## 2022-10-10 ENCOUNTER — TELEPHONE (OUTPATIENT)
Dept: SLEEP MEDICINE | Facility: CLINIC | Age: 52
End: 2022-10-10
Payer: OTHER GOVERNMENT

## 2022-10-19 ENCOUNTER — PROCEDURE VISIT (OUTPATIENT)
Dept: SLEEP MEDICINE | Facility: CLINIC | Age: 52
End: 2022-10-19
Payer: OTHER GOVERNMENT

## 2022-10-19 DIAGNOSIS — E66.01 CLASS 2 SEVERE OBESITY DUE TO EXCESS CALORIES WITH SERIOUS COMORBIDITY AND BODY MASS INDEX (BMI) OF 36.0 TO 36.9 IN ADULT: ICD-10-CM

## 2022-10-19 DIAGNOSIS — Z86.69 HISTORY OF OBSTRUCTIVE SLEEP APNEA: ICD-10-CM

## 2022-10-19 DIAGNOSIS — G47.30 SLEEP-DISORDERED BREATHING: ICD-10-CM

## 2022-10-19 PROCEDURE — 95806 SLEEP STUDY UNATT&RESP EFFT: CPT | Mod: PBBFAC | Performed by: INTERNAL MEDICINE

## 2022-10-19 NOTE — Clinical Note
1 night study MODERATE OBSTRUCTIVE SLEEP APNEA with overall AHI 26.6/hr ( 206 events): night #1 Oxygen desaturation: 88 %. SpO2 between 90% to 94% for 46 min. Patient snored 100 % time above 50 . Heart rate range: 60 bpm - 100 bpm REC's: Consider CPAP titration Therapy with APAP at 6-20 cm WP using mask of choice with heated humidification is an option. Weight loss/management. with regular exercise per direction of physician. Avoid drowsy driving. Follow up in sleep clinic to maximize adherence and ensure resolution of symptoms

## 2022-10-20 PROCEDURE — 95806 SLEEP STUDY UNATT&RESP EFFT: CPT | Mod: 26,S$PBB,, | Performed by: INTERNAL MEDICINE

## 2022-10-20 PROCEDURE — 95806 PR SLEEP STUDY, UNATTENDED, SIMUL RECORD HR/O2 SAT/RESP FLOW/RESP EFFT: ICD-10-PCS | Mod: 26,S$PBB,, | Performed by: INTERNAL MEDICINE

## 2022-10-21 ENCOUNTER — TELEPHONE (OUTPATIENT)
Dept: PULMONOLOGY | Facility: CLINIC | Age: 52
End: 2022-10-21
Payer: OTHER GOVERNMENT

## 2022-10-21 DIAGNOSIS — G47.33 SEVERE OBSTRUCTIVE SLEEP APNEA: Primary | ICD-10-CM

## 2022-10-21 NOTE — TELEPHONE ENCOUNTER
Telephone regarding Home Sleep Study results, severe obstructive sleep apnea AHI 26.6. with desats 88%. Recommendation for inlab titration. Spoke to patient he is agreeable to in lab CPAP titration, severe obstructive sleep apnea, nocturnal hypoxemia and intolerant to CPAP in past so much so, that abandoned use.     10/14/2022 Home Sleep Study   1 night study  MODERATE OBSTRUCTIVE SLEEP APNEA with overall AHI 26.6/hr ( 206 events): night #1  Oxygen desaturation: 88 %. SpO2 between 90% to 94% for 46 min.  Patient snored 100 % time above 50 .  Heart rate range: 60 bpm - 100 bpm    Orders Placed This Encounter   Procedures    CPAP Titration (Must have dx of SHAW from previous sleep study.)     Standing Status:   Future     Standing Expiration Date:   10/21/2023     1. Severe obstructive sleep apnea  CPAP Titration (Must have dx of SHAW from previous sleep study.)

## 2022-10-21 NOTE — ASSESSMENT & PLAN NOTE
10/14/2022 Home Sleep Study   1 night study  MODERATE OBSTRUCTIVE SLEEP APNEA with overall AHI 26.6/hr ( 206 events): night #1  Oxygen desaturation: 88 %. SpO2 between 90% to 94% for 46 min.  Patient snored 100 % time above 50 .  Heart rate range: 60 bpm - 100 bpm  10/21/2022 orders CPAP titration, he is agreeable to in lab CPAP titration, severe obstructive sleep apnea, nocturnal hypoxemia and intolerant to CPAP in past

## 2022-10-21 NOTE — PROCEDURES
Home Sleep Studies    Date/Time: 10/19/2022 8:00 AM  Performed by: Sina Wilder MD  Authorized by: Alfreda Hernandez NP     1 night study  MODERATE OBSTRUCTIVE SLEEP APNEA with overall AHI 26.6/hr ( 206 events): night #1  Oxygen desaturation: 88 %. SpO2 between 90% to 94% for 46 min.  Patient snored 100 % time above 50 .  Heart rate range: 60 bpm - 100 bpm  REC's:  Consider CPAP titration  Therapy with APAP at 6-20 cm WP using mask of choice with heated humidification is an option.  Weight loss/management. with regular exercise per direction of physician.  Avoid drowsy driving.  Follow up in sleep clinic to maximize adherence and ensure resolution of symptoms

## 2022-11-04 ENCOUNTER — HOSPITAL ENCOUNTER (OUTPATIENT)
Dept: SLEEP MEDICINE | Facility: HOSPITAL | Age: 52
Discharge: HOME | End: 2022-11-04
Attending: INTERNAL MEDICINE
Payer: OTHER GOVERNMENT

## 2022-11-04 DIAGNOSIS — G47.33 SEVERE OBSTRUCTIVE SLEEP APNEA: ICD-10-CM

## 2022-11-04 PROCEDURE — 95811 POLYSOM 6/>YRS CPAP 4/> PARM: CPT | Mod: 26,,, | Performed by: INTERNAL MEDICINE

## 2022-11-04 PROCEDURE — 95811 POLYSOM 6/>YRS CPAP 4/> PARM: CPT

## 2022-11-04 PROCEDURE — 95811 PR POLYSOMNOGRAPHY W/CPAP: ICD-10-PCS | Mod: 26,,, | Performed by: INTERNAL MEDICINE

## 2022-11-04 NOTE — Clinical Note
CONCLUSION: 1. Adequate titration CPAP 13 cm, residual AHI was 2.6/hr. Arousal index 8./hr ( low) 2. No Periodic limb movements 3. Delayed REM latency. DIAGNOSIS: 1. Obstructive sleep apnea Juan Bateman Page 2 of 7 RECOMMENDATIONS: 1. Commence therapy with CPAP 13 cmwp with nasal mask. 2. Weight loss 3. Close followup to ensure adherence

## 2022-11-07 ENCOUNTER — PATIENT MESSAGE (OUTPATIENT)
Dept: INTERNAL MEDICINE | Facility: CLINIC | Age: 52
End: 2022-11-07
Payer: OTHER GOVERNMENT

## 2022-11-07 DIAGNOSIS — Z00.00 ROUTINE GENERAL MEDICAL EXAMINATION AT HEALTH CARE FACILITY: Primary | ICD-10-CM

## 2022-11-08 ENCOUNTER — LAB VISIT (OUTPATIENT)
Dept: LAB | Facility: HOSPITAL | Age: 52
End: 2022-11-08
Attending: INTERNAL MEDICINE
Payer: OTHER GOVERNMENT

## 2022-11-08 DIAGNOSIS — Z00.00 ROUTINE GENERAL MEDICAL EXAMINATION AT HEALTH CARE FACILITY: ICD-10-CM

## 2022-11-08 DIAGNOSIS — D64.9 NORMOCYTIC ANEMIA: ICD-10-CM

## 2022-11-08 LAB
BASOPHILS # BLD AUTO: 0.03 K/UL (ref 0–0.2)
BASOPHILS NFR BLD: 0.8 % (ref 0–1.9)
DIFFERENTIAL METHOD: ABNORMAL
EOSINOPHIL # BLD AUTO: 0.1 K/UL (ref 0–0.5)
EOSINOPHIL NFR BLD: 1.3 % (ref 0–8)
ERYTHROCYTE [DISTWIDTH] IN BLOOD BY AUTOMATED COUNT: 12.9 % (ref 11.5–14.5)
HCT VFR BLD AUTO: 42.1 % (ref 40–54)
HGB BLD-MCNC: 13.6 G/DL (ref 14–18)
IMM GRANULOCYTES # BLD AUTO: 0.02 K/UL (ref 0–0.04)
IMM GRANULOCYTES NFR BLD AUTO: 0.5 % (ref 0–0.5)
LYMPHOCYTES # BLD AUTO: 1.4 K/UL (ref 1–4.8)
LYMPHOCYTES NFR BLD: 36.7 % (ref 18–48)
MCH RBC QN AUTO: 29.7 PG (ref 27–31)
MCHC RBC AUTO-ENTMCNC: 32.3 G/DL (ref 32–36)
MCV RBC AUTO: 92 FL (ref 82–98)
MONOCYTES # BLD AUTO: 0.4 K/UL (ref 0.3–1)
MONOCYTES NFR BLD: 9.7 % (ref 4–15)
NEUTROPHILS # BLD AUTO: 2 K/UL (ref 1.8–7.7)
NEUTROPHILS NFR BLD: 51 % (ref 38–73)
NRBC BLD-RTO: 0 /100 WBC
PLATELET # BLD AUTO: 208 K/UL (ref 150–450)
PMV BLD AUTO: 11.7 FL (ref 9.2–12.9)
RBC # BLD AUTO: 4.58 M/UL (ref 4.6–6.2)
WBC # BLD AUTO: 3.92 K/UL (ref 3.9–12.7)

## 2022-11-08 PROCEDURE — 82040 ASSAY OF SERUM ALBUMIN: CPT | Performed by: INTERNAL MEDICINE

## 2022-11-08 PROCEDURE — 85025 COMPLETE CBC W/AUTO DIFF WBC: CPT | Performed by: INTERNAL MEDICINE

## 2022-11-09 NOTE — PROCEDURES
"   CONCLUSION:   1. Adequate titration CPAP 13 cm, residual AHI was 2.6/hr. Arousal index 8./hr ( low)   2. No Periodic limb movements   3. Delayed REM latency.     DIAGNOSIS:   1. Obstructive sleep apnea   Juan Bateman Page 2 of 7     RECOMMENDATIONS:   1. Commence therapy with CPAP 13 cmwp with nasal mask.   2. Weight loss   3. Close followup to ensure adherence       See imported Sleep Study result in "Chart Review" under the   "Media tab".      (This Sleep Study was interpreted by a Board Certified Sleep  Specialist who conducted an epoch-by-epoch review of the entire  raw data recording.)     (The indication for this sleep study was reviewed and deemed  appropriate by [facility] Practice Parameters or other reasons by a  Board Certified Sleep Specialist.)      The sleep study that you ordered is complete.  You have ordered sleep LAB services to perform the sleep study forNAME@       You can look  for the report in the  Media as a procedure document type.    As the ordering provider, you are responsible for reviewing the results and implementing a treatment plan with your patient.     If you need a Sleep Medicine provider to explain the sleep study findings and arrange treatment for the patient, please refer patient for consultation to our Sleep Clinic via Psychiatric with Ambulatory Consult Sleep.     To do that please place an order for an  "Ambulatory Consult Sleep" - it will go to our clinic work queue for our Medical Assistant to contact the patient for an appointment.     For any questions, please contact our clinic staff at  to talk to clinical staff or pulmonary nurse navigator    Sina Wilder MD   "

## 2022-11-14 ENCOUNTER — PATIENT MESSAGE (OUTPATIENT)
Dept: PULMONOLOGY | Facility: CLINIC | Age: 52
End: 2022-11-14
Payer: OTHER GOVERNMENT

## 2022-11-14 DIAGNOSIS — G47.33 SEVERE OBSTRUCTIVE SLEEP APNEA: Primary | ICD-10-CM

## 2022-11-14 LAB
ALBUMIN SERPL-MCNC: 4.5 G/DL (ref 3.6–5.1)
SHBG SERPL-SCNC: 17 NMOL/L (ref 10–50)
TESTOST FREE SERPL-MCNC: 38.1 PG/ML (ref 46–224)
TESTOST SERPL-MCNC: 193 NG/DL (ref 250–1100)
TESTOSTERONE.FREE+WB SERPL-MCNC: 78.5 NG/DL (ref 110–575)

## 2022-11-15 ENCOUNTER — TELEPHONE (OUTPATIENT)
Dept: INTERNAL MEDICINE | Facility: CLINIC | Age: 52
End: 2022-11-15
Payer: OTHER GOVERNMENT

## 2022-11-15 DIAGNOSIS — E29.1 HYPOGONADISM IN MALE: Primary | ICD-10-CM

## 2022-11-15 NOTE — TELEPHONE ENCOUNTER
Spoke with patient originally he wanted to come in to review results before ordering CPAP. Patient decided he wanted to go ahead and order CPAP with nasal mask then follow up IDL.     Orders Placed This Encounter   Procedures    CPAP FOR HOME USE     10/14/2022 Home Sleep Study   1 night study  MODERATE OBSTRUCTIVE SLEEP APNEA with overall AHI 26.6/hr ( 206 events): night #1  Oxygen desaturation: 88 %. SpO2 between 90% to 94% for 46 min.  Patient snored 100 % time above 50 .  Heart rate range: 60 bpm - 100 bpm     Order Specific Question:   Length of need (1-99 months):     Answer:   99     Order Specific Question:   Type ():     Answer:   CPAP     Order Specific Question:   CPAP setting (cmH20):     Answer:   13     Order Specific Question:   Fulfillment Priority:     Answer:   Level 4:  all others     Order Specific Question:   Humidification ():     Answer:   Heated     Order Specific Question:   Choose ONE mask type and its corresponding cushions and/or pillows:     Answer:    Nasal Mask, 1 per 90 days:  Nasal Cushions, (6 per 90 days):  Nasal Pillows, (6 per 90 days)     Comments:   or mask of choice     Order Specific Question:   Choose EITHER Heated or Non-Heated Tubjing     Answer:    Non-Heated Tubing, 1 per 90 days     Order Specific Question:   Number of Days Needed:     Answer:   99     Order Specific Question:   All other supplies as needed as listed below:     Answer:    Headgear, 1 per 180 days     Order Specific Question:   All other supplies as needed as listed below:     Answer:    Chin Strap, 1 per 180 days     Order Specific Question:   All other supplies as needed as listed below:     Answer:    Disposable Filter, 6 per 90 days     Order Specific Question:   All other supplies as needed as listed below:     Answer:    Non-Disposable Filter, 1 per 180 days     Order Specific Question:   All other supplies as needed as listed below:     Answer:     Humidifier Chamber, 1 per 180 days     1. Severe obstructive sleep apnea  CPAP FOR HOME USE        Follow up 31-90 days from obtaining CPAP for IDL.

## 2022-11-29 ENCOUNTER — LAB VISIT (OUTPATIENT)
Dept: LAB | Facility: HOSPITAL | Age: 52
End: 2022-11-29
Attending: INTERNAL MEDICINE
Payer: OTHER GOVERNMENT

## 2022-11-29 DIAGNOSIS — E29.1 HYPOGONADISM IN MALE: ICD-10-CM

## 2022-11-29 PROCEDURE — 36415 COLL VENOUS BLD VENIPUNCTURE: CPT | Mod: PO | Performed by: INTERNAL MEDICINE

## 2022-11-29 PROCEDURE — 82040 ASSAY OF SERUM ALBUMIN: CPT | Performed by: INTERNAL MEDICINE

## 2022-11-29 RX ORDER — ATORVASTATIN CALCIUM 20 MG/1
20 TABLET, FILM COATED ORAL DAILY
Qty: 90 TABLET | Refills: 1 | Status: SHIPPED | OUTPATIENT
Start: 2022-11-29 | End: 2023-08-11

## 2022-11-30 NOTE — TELEPHONE ENCOUNTER
Refill Decision Note   Juan Bateman  is requesting a refill authorization.  Brief Assessment and Rationale for Refill:  Approve     Medication Therapy Plan:       Medication Reconciliation Completed: No   Comments:     No Care Gaps recommended.     Note composed:10:54 PM 11/29/2022

## 2022-11-30 NOTE — TELEPHONE ENCOUNTER
No new care gaps identified.  [facility] Embedded Care Gaps. Reference number: [id number]. 11/29/2022   7:25:42 PM CST

## 2022-12-05 LAB
ALBUMIN SERPL-MCNC: 4.5 G/DL (ref 3.6–5.1)
SHBG SERPL-SCNC: 21 NMOL/L (ref 10–50)
TESTOST FREE SERPL-MCNC: 43.5 PG/ML (ref 46–224)
TESTOST SERPL-MCNC: 248 NG/DL (ref 250–1100)
TESTOSTERONE.FREE+WB SERPL-MCNC: 89.4 NG/DL (ref 110–575)

## 2022-12-06 DIAGNOSIS — E29.1 HYPOGONADISM IN MALE: Primary | ICD-10-CM

## 2022-12-21 ENCOUNTER — OFFICE VISIT (OUTPATIENT)
Dept: UROLOGY | Facility: CLINIC | Age: 52
End: 2022-12-21
Payer: OTHER GOVERNMENT

## 2022-12-21 VITALS
HEIGHT: 72 IN | WEIGHT: 270.75 LBS | DIASTOLIC BLOOD PRESSURE: 80 MMHG | BODY MASS INDEX: 36.67 KG/M2 | HEART RATE: 68 BPM | SYSTOLIC BLOOD PRESSURE: 141 MMHG

## 2022-12-21 DIAGNOSIS — E29.1 HYPOGONADISM IN MALE: ICD-10-CM

## 2022-12-21 DIAGNOSIS — E34.9 TESTOSTERONE DEFICIENCY: Primary | ICD-10-CM

## 2022-12-21 DIAGNOSIS — G47.33 OSA (OBSTRUCTIVE SLEEP APNEA): ICD-10-CM

## 2022-12-21 DIAGNOSIS — N52.9 ERECTILE DYSFUNCTION, UNSPECIFIED ERECTILE DYSFUNCTION TYPE: ICD-10-CM

## 2022-12-21 LAB
BILIRUB SERPL-MCNC: NORMAL MG/DL
BLOOD URINE, POC: NORMAL
CLARITY, POC UA: CLEAR
COLOR, POC UA: YELLOW
GLUCOSE UR QL STRIP: NORMAL
KETONES UR QL STRIP: NORMAL
LEUKOCYTE ESTERASE URINE, POC: NORMAL
NITRITE, POC UA: NORMAL
PH, POC UA: 5
PROTEIN, POC: NORMAL
SPECIFIC GRAVITY, POC UA: 1.01
UROBILINOGEN, POC UA: NORMAL

## 2022-12-21 PROCEDURE — 99244 PR OFFICE CONSULTATION,LEVEL IV: ICD-10-PCS | Mod: S$PBB,,, | Performed by: UROLOGY

## 2022-12-21 PROCEDURE — 99999 PR PBB SHADOW E&M-EST. PATIENT-LVL IV: CPT | Mod: PBBFAC,,, | Performed by: UROLOGY

## 2022-12-21 PROCEDURE — 81002 URINALYSIS NONAUTO W/O SCOPE: CPT | Mod: PBBFAC,PN | Performed by: UROLOGY

## 2022-12-21 PROCEDURE — 99214 OFFICE O/P EST MOD 30 MIN: CPT | Mod: PBBFAC,PN | Performed by: UROLOGY

## 2022-12-21 PROCEDURE — 99244 OFF/OP CNSLTJ NEW/EST MOD 40: CPT | Mod: S$PBB,,, | Performed by: UROLOGY

## 2022-12-21 PROCEDURE — 99999 PR PBB SHADOW E&M-EST. PATIENT-LVL IV: ICD-10-PCS | Mod: PBBFAC,,, | Performed by: UROLOGY

## 2022-12-21 RX ORDER — TESTOSTERONE ENANTHATE 75 MG/.5ML
75 INJECTION SUBCUTANEOUS
Qty: 2 ML | Refills: 0 | Status: SHIPPED | OUTPATIENT
Start: 2022-12-21 | End: 2023-01-17

## 2022-12-21 RX ORDER — TESTOSTERONE ENANTHATE 75 MG/.5ML
75 INJECTION SUBCUTANEOUS
Qty: 6 ML | Refills: 0 | Status: SHIPPED | OUTPATIENT
Start: 2022-12-21 | End: 2023-01-17

## 2022-12-21 NOTE — PROGRESS NOTES
Chief Complaint   Patient presents with    [facility] Care     Low testosterone        Referring Provider: Dr. Nigel Naranjo     History of Present Illness:   Juan Bateman is a 52 y.o. male here for evaluation of Establish Care (Low testosterone )    12/21/22-53yo male, here for evaluation of hypogonadism. Pt had 2 recent testosterone levels below 300. He does have SHAW. He is wearing his C-pap. He reports fatigue and lack of motivation, even to do things that he likes. He reports some ED since starting HTN meds. Takes viagra, which helps some. He reports low libido.       Review of Systems   Respiratory:  Negative for shortness of breath.    Cardiovascular:  Negative for chest pain.   Gastrointestinal:  Negative for constipation and diarrhea.   Genitourinary:  Negative for difficulty urinating and nocturia.   Musculoskeletal:  Positive for back pain.   All other systems reviewed and are negative.      Past Medical History:   Diagnosis Date    Erectile dysfunction     HTN (hypertension)     Hyperlipidemia     Normocytic anemia 5/12/2022    SHAW (obstructive sleep apnea)     PONV (postoperative nausea and vomiting)     Right knee meniscal tear 6/30/2016    Tear of medial meniscus of left knee 10/20/2015    Tear of meniscus of left knee 11/10/2015    Transient synovitis, right knee 6/30/2016       Past Surgical History:   Procedure Laterality Date    COLONOSCOPY N/A 3/15/2019    Procedure: COLONOSCOPY;  Surgeon: Philip Teague MD;  Location: [facility];  Service: Endoscopy;  Laterality: N/A;    ESOPHAGOGASTRODUODENOSCOPY N/A 6/28/2022    Procedure: EGD (ESOPHAGOGASTRODUODENOSCOPY);  Surgeon: Rama Montejo MD;  Location: [facility];  Service: Endoscopy;  Laterality: N/A;    eye surgery      trauma at age 16    KNEE SURGERY      left knee x 3       Family History   Problem Relation Age of Onset    Diabetes Mother     Heart disease Mother     Hodgkin's lymphoma Mother         non hodgkins    Diabetes  Father     Prostate cancer Neg Hx        Social History     Tobacco Use    Smoking status: Never    Smokeless tobacco: Never   Substance Use Topics    Alcohol use: Yes     Comment: social    Drug use: No       Current Outpatient Medications   Medication Sig Dispense Refill    ALPRAZolam (XANAX) 0.25 MG tablet Take 1 tablet (0.25 mg total) by mouth 2 (two) times daily as needed for Anxiety. 60 tablet 0    amLODIPine (NORVASC) 5 MG tablet Take 1 tablet (5 mg total) by mouth once daily. 90 tablet 3    aspirin 81 mg Tab Take 1 tablet by mouth Daily.      atorvastatin (LIPITOR) 20 MG tablet Take 1 tablet (20 mg total) by mouth once daily. 90 tablet 1    celecoxib (CELEBREX) 200 MG capsule Take 1 capsule (200 mg total) by mouth once daily. 90 capsule 3    citalopram (CELEXA) 20 MG tablet Take 1 tablet (20 mg total) by mouth once daily. 90 tablet 3    finasteride (PROPECIA) 1 mg tablet Take 1 tablet (1 mg total) by mouth once daily. 90 tablet 3    latanoprost 0.005 % ophthalmic solution       metoprolol succinate (TOPROL-XL) 50 MG 24 hr tablet Take 1 tablet (50 mg total) by mouth once daily. 90 tablet 3    omeprazole (PRILOSEC) 40 MG capsule Take 1 capsule (40 mg total) by mouth 2 (two) times a day. 60 capsule 11    sildenafiL (VIAGRA) 50 MG tablet TAKE 1 TO 2 TABLETS 20 MINUTES PRIOR TO SEXUAL ACTIVITY 30 tablet 0    testosterone enanthate (XYOSTED) 75 mg/0.5 mL AtIn Inject 75 mg into the skin every 7 days. 6 mL 0    testosterone enanthate (XYOSTED) 75 mg/0.5 mL AtIn Inject 75 mg into the skin every 7 days. 2 mL 0     No current facility-administered medications for this visit.     Facility-Administered Medications Ordered in Other Visits   Medication Dose Route Frequency Provider Last Rate Last Admin    lactated ringers infusion   Intravenous Continuous Paris Palm MD   Stopped at 06/28/22 0701       Review of patient's allergies indicates:  No Known Allergies    Physical Exam  Vitals:    12/21/22 1003    BP: (!) 141/80   Pulse: 68       General: Well-developed, well-nourished in no acute distress  HEENT: Normocephalic, atraumatic, Extraocular movements intact  Neck: supple, trachea midline, no cervical or supraclavicular lymphadenopathy  Respirations: even and unlabored  Back: midline spine, no CVA tenderness  Abdomen: soft, Non-tender, non-distended, no organomegaly or palpable masses, no rebound or guarding  : circumcised male phallus without lesions, orthotopic urethral meatus, no inguinal hernia, no inguinal lymphadenopathy, no scrotal lesions, testicles descended bilaterally without mass or tenderness  Rectal: 30g prostate, no nodules or tenderness. No gross blood  Extremities: atraumatic, moves all equally, no clubbing, cyanosis or edema  Psych: normal affect  Skin: warm and dry, no lesions  Neuro: Alert and oriented, Cranial nerves II-XII grossly intact    Urinalysis  Negative for blood, LE, nit        Lab Results   Component Value Date    PSA 0.33 05/09/2022    PSA 0.19 03/15/2021    PSA 0.18 01/07/2019     Testosterone:     11/8/22: 193  11/29/22: 248      Assessment:   1. Testosterone deficiency  CBC Without Differential    Comprehensive Metabolic Panel    Prostate Specific Antigen, Diagnostic    Testosterone      2. SHAW (obstructive sleep apnea)        3. Hypogonadism in male  Ambulatory referral/consult to Urology      4. Erectile dysfunction, unspecified erectile dysfunction type            Plan:  Testosterone deficiency  -     CBC Without Differential; Future; Expected date: 03/16/2023  -     Comprehensive Metabolic Panel; Future; Expected date: 03/16/2023  -     Prostate Specific Antigen, Diagnostic; Future; Expected date: 03/16/2023  -     Testosterone; Future; Expected date: 03/16/2023    SHAW (obstructive sleep apnea)    Hypogonadism in male  -     Ambulatory referral/consult to Urology    Erectile dysfunction, unspecified erectile dysfunction type    Other orders  -     testosterone enanthate  (XYOSTED) 75 mg/0.5 mL AtIn; Inject 75 mg into the skin every 7 days.  Dispense: 6 mL; Refill: 0  -     testosterone enanthate (XYOSTED) 75 mg/0.5 mL AtIn; Inject 75 mg into the skin every 7 days.  Dispense: 2 mL; Refill: 0    I had a detailed discussion with the patient, reviewing the diagnosis and management of testosterone deficiency. I explained that AUA guidelines recommend obtaining two early morning testosterone values less than 300 with associated symptoms. We reviewed the management of low Testosterone, including exogenous T(injections, creams, gels, intranasal gel and oral), SERMs, aromatase inhibitors, and recombinant HCG. Reviewed that any exogenous T usage will result in drastically impaired fertility, and that this effect may be permanent. I reviewed that if the patient desires maintaining fertility while addressing his low T, exogenous T would not be an appropriate choice. Also explained that addressing his low T would require routine lab testing to ensure that his blood counts remain stable. We discussed the specific risks/benefits of each type of exogenous testosterone, including the risk of transference with topical treatments. We discussed the data as it relates to cardiovascular health and risk. We also discussed that while there is no evidence linking testosterone therapy to the development of prostate cancer, there could be risk in an individual with prostate cancer, as it relates to prostate cancer growth.       Follow up in about 3 months (around 3/21/2023) for labs before visit.

## 2022-12-22 ENCOUNTER — PATIENT MESSAGE (OUTPATIENT)
Dept: UROLOGY | Facility: CLINIC | Age: 52
End: 2022-12-22
Payer: OTHER GOVERNMENT

## 2023-01-05 ENCOUNTER — PATIENT MESSAGE (OUTPATIENT)
Dept: UROLOGY | Facility: CLINIC | Age: 53
End: 2023-01-05
Payer: OTHER GOVERNMENT

## 2023-01-16 ENCOUNTER — PATIENT MESSAGE (OUTPATIENT)
Dept: UROLOGY | Facility: CLINIC | Age: 53
End: 2023-01-16
Payer: OTHER GOVERNMENT

## 2023-01-17 RX ORDER — NEEDLES, DISPOSABLE 23GX1 1/2"
NEEDLE, DISPOSABLE MISCELLANEOUS
Qty: 12 EACH | Refills: 4 | Status: SHIPPED | OUTPATIENT
Start: 2023-01-17

## 2023-01-17 RX ORDER — SYRINGE, DISPOSABLE, 3 ML
SYRINGE, EMPTY DISPOSABLE MISCELLANEOUS
Qty: 12 EACH | Refills: 4 | Status: SHIPPED | OUTPATIENT
Start: 2023-01-17

## 2023-01-17 RX ORDER — NEEDLES, DISPOSABLE 25GX5/8"
NEEDLE, DISPOSABLE MISCELLANEOUS
Qty: 12 EACH | Refills: 4 | Status: SHIPPED | OUTPATIENT
Start: 2023-01-17

## 2023-01-17 RX ORDER — TESTOSTERONE CYPIONATE 200 MG/ML
200 INJECTION, SOLUTION INTRAMUSCULAR
Qty: 10 ML | Refills: 0 | Status: SHIPPED | OUTPATIENT
Start: 2023-01-17 | End: 2023-03-21 | Stop reason: SDUPTHER

## 2023-01-19 ENCOUNTER — PATIENT MESSAGE (OUTPATIENT)
Dept: UROLOGY | Facility: CLINIC | Age: 53
End: 2023-01-19
Payer: OTHER GOVERNMENT

## 2023-01-21 RX ORDER — OMEPRAZOLE 40 MG/1
CAPSULE, DELAYED RELEASE ORAL
Refills: 0 | OUTPATIENT
Start: 2023-01-21

## 2023-01-21 NOTE — TELEPHONE ENCOUNTER
No new care gaps identified.  [facility] Embedded Care Gaps. Reference number: [id number]. 1/21/2023   10:30:08 AM CST

## 2023-01-21 NOTE — TELEPHONE ENCOUNTER
Refill Decision Note   Juan Bateman  is requesting a refill authorization.  Brief Assessment and Rationale for Refill:  Quick Discontinue     Medication Therapy Plan: Pharmacy is requesting new scripts for the following medications without required information, (sig/ frequency/qty/etc)     Medication Reconciliation Completed: No   Comments:     No Care Gaps recommended.     Note composed:12:06 PM 01/21/2023

## 2023-01-23 ENCOUNTER — TELEPHONE (OUTPATIENT)
Dept: UROLOGY | Facility: CLINIC | Age: 53
End: 2023-01-23
Payer: OTHER GOVERNMENT

## 2023-01-27 ENCOUNTER — CLINICAL SUPPORT (OUTPATIENT)
Dept: UROLOGY | Facility: CLINIC | Age: 53
End: 2023-01-27
Payer: OTHER GOVERNMENT

## 2023-01-27 DIAGNOSIS — E34.9 TESTOSTERONE DEFICIENCY: Primary | ICD-10-CM

## 2023-01-27 PROCEDURE — 99999 PR PBB SHADOW E&M-EST. PATIENT-LVL I: ICD-10-PCS | Mod: PBBFAC,,,

## 2023-01-27 PROCEDURE — 99211 OFF/OP EST MAY X REQ PHY/QHP: CPT | Mod: PBBFAC,PN

## 2023-01-27 PROCEDURE — 99999 PR PBB SHADOW E&M-EST. PATIENT-LVL I: CPT | Mod: PBBFAC,,,

## 2023-01-27 NOTE — PROGRESS NOTES
Patient here for testosterone teaching accompanied by wife. Patient brought in testosterone and injection administration was taught and performed by nurse using aseptic technique. Injection was given to (L) upper thigh IM. Tolerated injection well and verbalized understanding of injection administration teaching.

## 2023-02-10 ENCOUNTER — OFFICE VISIT (OUTPATIENT)
Dept: PULMONOLOGY | Facility: CLINIC | Age: 53
End: 2023-02-10
Payer: OTHER GOVERNMENT

## 2023-02-10 VITALS
DIASTOLIC BLOOD PRESSURE: 84 MMHG | OXYGEN SATURATION: 97 % | HEART RATE: 75 BPM | HEIGHT: 72 IN | SYSTOLIC BLOOD PRESSURE: 128 MMHG | WEIGHT: 272.5 LBS | BODY MASS INDEX: 36.91 KG/M2 | RESPIRATION RATE: 20 BRPM

## 2023-02-10 DIAGNOSIS — G47.33 OSA ON CPAP: Primary | ICD-10-CM

## 2023-02-10 DIAGNOSIS — I10 PRIMARY HYPERTENSION: ICD-10-CM

## 2023-02-10 DIAGNOSIS — E66.01 CLASS 2 SEVERE OBESITY DUE TO EXCESS CALORIES WITH SERIOUS COMORBIDITY AND BODY MASS INDEX (BMI) OF 36.0 TO 36.9 IN ADULT: ICD-10-CM

## 2023-02-10 PROCEDURE — 99214 OFFICE O/P EST MOD 30 MIN: CPT | Mod: S$PBB,,, | Performed by: NURSE PRACTITIONER

## 2023-02-10 PROCEDURE — 99214 PR OFFICE/OUTPT VISIT, EST, LEVL IV, 30-39 MIN: ICD-10-PCS | Mod: S$PBB,,, | Performed by: NURSE PRACTITIONER

## 2023-02-10 PROCEDURE — 99214 OFFICE O/P EST MOD 30 MIN: CPT | Mod: PBBFAC | Performed by: NURSE PRACTITIONER

## 2023-02-10 PROCEDURE — 99999 PR PBB SHADOW E&M-EST. PATIENT-LVL IV: CPT | Mod: PBBFAC,,, | Performed by: NURSE PRACTITIONER

## 2023-02-10 PROCEDURE — 99999 PR PBB SHADOW E&M-EST. PATIENT-LVL IV: ICD-10-PCS | Mod: PBBFAC,,, | Performed by: NURSE PRACTITIONER

## 2023-02-10 NOTE — PROGRESS NOTES
Subjective:      Patient ID: Juan Bateman is a 52 y.o. male.    Chief Complaint: Sleep Apnea    HPI: Juan Bateman presents to clinic for follow up for SHAW with initial CPAP complaince assessment.  He is on CPAP of 13 cmH2O pressure which is optimally controlling sleep apnea with apneic index (AHI) 0.9 events an hour.   He is compliant with CPAP use. Complaince download today reveals 99% of days with greater than 4 hours of device use.   Patient reports benefit from CPAP use and denies snoring or excessive daytime sleepiness.  Patient reports no complaints. Dreamwear nasal mask is tolerated.     11/4/2022 CPAP titration CPAP 13 cm optimal, residual AHI was 2.6/hr. Arousal index 8./hr ( low)   10/14/2022 Home Sleep Study   1 night study  MODERATE OBSTRUCTIVE SLEEP APNEA with overall AHI 26.6/hr ( 206 events): night #1  Oxygen desaturation: 88 %. SpO2 between 90% to 94% for 46 min.  Patient snored 100 % time above 50 .  Heart rate range: 60 bpm - 100 bpm    Compliance Report  Compliance  Payor Standard  Usage 11/29/2022 - 02/09/2023  Usage days 73/73 days (100%)  >= 4 hours 72 days (99%)  < 4 hours 1 days (1%)  Usage hours 508 hours 30 minutes  Average usage (total days) 6 hours 58 minutes  Average usage (days used) 6 hours 58 minutes  Median usage (days used) 7 hours 6 minutes  AirSense 10 AutoSet  Serial number [id number]  Mode CPAP  Set pressure 13 cmH2O  EPR Fulltime  EPR level 3  Therapy  Leaks - L/min Median: 3.4 95th percentile: 25.1 Maximum: 38.5  Events per hour AI: 0.8 HI: 0.1 AHI: 0.9  Apnea Index Central: 0.4 Obstructive: 0.3 Unknown: 0.0  RERA Index 0.1    Denio Sleepiness Scale   EPWORTH SLEEPINESS SCALE 2/9/2023 10/4/2022 12/31/2015 12/1/2015 10/26/2015 7/30/2015   Sitting and reading 1 2 2 2 2 3   Watching TV 1 1 1 1 1 2   Sitting, inactive in a public place (e.g. a theatre or a meeting) 0 1 0 1 0 0   As a passenger in a car for an hour without a break 0 0 0 0 0 3   Lying  down to rest in the afternoon when circumstances permit 1 2 1 1 2 1   Sitting and talking to someone 0 0 0 0 0 0   Sitting quietly after a lunch without alcohol 1 1 1 0 1 3   In a car, while stopped for a few minutes in traffic 0 0 1 1 1 2   Total score 4 7 6 6 7 14       Previous Report Reviewed: lab reports and office notes     Past Medical History: The following portions of the patient's history were reviewed and updated as appropriate:   He  has a past surgical history that includes Knee surgery; eye surgery; Colonoscopy (N/A, 3/15/2019); and Esophagogastroduodenoscopy (N/A, 6/28/2022).  His family history includes Diabetes in his father and mother; Heart disease in his mother; Hodgkin's lymphoma in his mother.  He  reports that he has never smoked. He has never used smokeless tobacco. He reports current alcohol use. He reports that he does not use drugs.  He has a current medication list which includes the following prescription(s): alprazolam, amlodipine, aspirin, atorvastatin, celecoxib, citalopram, finasteride, latanoprost, metoprolol succinate, hypodermic needles, easy touch hypodermic needle, omeprazole, sildenafil, syringe (disposable), and testosterone cypionate, and the following Facility-Administered Medications: lactated ringers.  He has No Known Allergies..    The following portions of the patient's history were reviewed and updated as appropriate: allergies, current medications, past family history, past medical history, past social history, past surgical history and problem list.    Review of Systems   Constitutional:  Negative for fever, chills, weight loss, weight gain, activity change, appetite change, fatigue and night sweats.   HENT:  Negative for postnasal drip, rhinorrhea, sinus pressure, voice change and congestion.    Eyes:  Negative for redness and itching.   Respiratory:  Negative for snoring, cough, sputum production, chest tightness, shortness of breath, wheezing, orthopnea, asthma  nighttime symptoms, dyspnea on extertion, use of rescue inhaler and somnolence.    Cardiovascular: Negative.  Negative for chest pain, palpitations and leg swelling.   Genitourinary:  Negative for difficulty urinating and hematuria.   Endocrine:  Negative for cold intolerance and heat intolerance.    Musculoskeletal:  Negative for arthralgias, gait problem, joint swelling and myalgias.   Skin: Negative.    Gastrointestinal:  Negative for nausea, vomiting, abdominal pain and acid reflux.   Neurological:  Negative for dizziness, weakness, light-headedness and headaches.   Hematological:  Negative for adenopathy. No excessive bruising.   All other systems reviewed and are negative.   Objective:   /84   Pulse 75   Resp 20   Ht 6' (1.829 m)   Wt 123.6 kg (272 lb 7.8 oz)   SpO2 97%   BMI 36.96 kg/m²   Physical Exam  Vitals and nursing note reviewed.   Constitutional:       General: He is not in acute distress.     Appearance: Normal appearance. He is well-developed. He is not ill-appearing or toxic-appearing.   HENT:      Head: Normocephalic and atraumatic.      Right Ear: External ear normal.      Left Ear: External ear normal.      Nose: Nose normal.      Mouth/Throat:      Mouth: Mucous membranes are moist.      Pharynx: Oropharynx is clear. No oropharyngeal exudate.   Eyes:      Conjunctiva/sclera: Conjunctivae normal.   Cardiovascular:      Rate and Rhythm: Normal rate and regular rhythm.      Heart sounds: Normal heart sounds.   Pulmonary:      Effort: Pulmonary effort is normal.      Breath sounds: Normal breath sounds.   Abdominal:      Palpations: Abdomen is soft.   Musculoskeletal:      Cervical back: Normal range of motion and neck supple.   Skin:     General: Skin is warm and dry.   Neurological:      Mental Status: He is alert and oriented to person, place, and time.   Psychiatric:         Behavior: Behavior normal. Behavior is cooperative.         Thought Content: Thought content normal.          Judgment: Judgment normal.       Personal Diagnostic Review  CPAP download    Assessment:     1. SHAW on CPAP    2. Class 2 severe obesity due to excess calories with serious comorbidity and body mass index (BMI) of 36.0 to 36.9 in adult    3. Primary hypertension        No orders of the defined types were placed in this encounter.    Plan:     Problem List Items Addressed This Visit       SHAW on CPAP - Primary     Benefits and compliant with CPAP 13 cm with optimal control AHI 0.9  Res Med--No Modem  Set Up Date: 11/29/2022  Dreamwear nasal mask  HME: [facility]            HTN (hypertension)     Stable and controlled. Continue current treatment plan as previously prescribed with your PCP.              Class 2 severe obesity due to excess calories with serious comorbidity and body mass index (BMI) of 36.0 to 36.9 in adult     Encouraged calorie reduction and 30 minutes of exercise daily. Discussed impact of obesity on general health.  Wt Readings from Last 9 Encounters:   02/10/23 123.6 kg (272 lb 7.8 oz)   12/21/22 122.8 kg (270 lb 11.6 oz)   10/07/22 122.8 kg (270 lb 11.6 oz)   06/28/22 119.8 kg (264 lb 1.8 oz)   05/12/22 120.7 kg (266 lb 1.5 oz)   07/13/21 120.7 kg (266 lb)   03/22/21 120.9 kg (266 lb 8.6 oz)   02/22/21 122.3 kg (269 lb 10 oz)   09/20/19 118.3 kg (260 lb 12.9 oz)   Body mass index is 36.96 kg/m².               (DME) - [facility]  Reviewed therapeutic goals for positive airway pressure therapy CPAP  Ideal is usage 100% of nights for 6 - 8 hours per night. Minimum usage is 70% of night for at least 4 hours per night used.     Follow up in about 6 months (around 8/10/2023) for CPAP 6 mo compliance download.

## 2023-02-10 NOTE — ASSESSMENT & PLAN NOTE
Benefits and compliant with CPAP 13 cm with optimal control AHI 0.9  Res Med--No Modem  Set Up Date: 11/29/2022  Dreamwear nasal mask  HME: [facility]

## 2023-02-10 NOTE — ASSESSMENT & PLAN NOTE
Encouraged calorie reduction and 30 minutes of exercise daily. Discussed impact of obesity on general health.  Wt Readings from Last 9 Encounters:   02/10/23 123.6 kg (272 lb 7.8 oz)   12/21/22 122.8 kg (270 lb 11.6 oz)   10/07/22 122.8 kg (270 lb 11.6 oz)   06/28/22 119.8 kg (264 lb 1.8 oz)   05/12/22 120.7 kg (266 lb 1.5 oz)   07/13/21 120.7 kg (266 lb)   03/22/21 120.9 kg (266 lb 8.6 oz)   02/22/21 122.3 kg (269 lb 10 oz)   09/20/19 118.3 kg (260 lb 12.9 oz)   Body mass index is 36.96 kg/m².

## 2023-03-08 ENCOUNTER — PATIENT MESSAGE (OUTPATIENT)
Dept: UROLOGY | Facility: CLINIC | Age: 53
End: 2023-03-08
Payer: OTHER GOVERNMENT

## 2023-03-14 ENCOUNTER — LAB VISIT (OUTPATIENT)
Dept: LAB | Facility: HOSPITAL | Age: 53
End: 2023-03-14
Attending: UROLOGY
Payer: OTHER GOVERNMENT

## 2023-03-14 DIAGNOSIS — E34.9 TESTOSTERONE DEFICIENCY: ICD-10-CM

## 2023-03-14 LAB
ALBUMIN SERPL BCP-MCNC: 4.2 G/DL (ref 3.5–5.2)
ALP SERPL-CCNC: 78 U/L (ref 55–135)
ALT SERPL W/O P-5'-P-CCNC: 22 U/L (ref 10–44)
ANION GAP SERPL CALC-SCNC: 7 MMOL/L (ref 8–16)
AST SERPL-CCNC: 21 U/L (ref 10–40)
BILIRUB SERPL-MCNC: 0.4 MG/DL (ref 0.1–1)
BUN SERPL-MCNC: 11 MG/DL (ref 6–20)
CALCIUM SERPL-MCNC: 9.8 MG/DL (ref 8.7–10.5)
CHLORIDE SERPL-SCNC: 104 MMOL/L (ref 95–110)
CO2 SERPL-SCNC: 30 MMOL/L (ref 23–29)
COMPLEXED PSA SERPL-MCNC: 0.22 NG/ML (ref 0–4)
CREAT SERPL-MCNC: 0.8 MG/DL (ref 0.5–1.4)
ERYTHROCYTE [DISTWIDTH] IN BLOOD BY AUTOMATED COUNT: 13.2 % (ref 11.5–14.5)
EST. GFR  (NO RACE VARIABLE): >60 ML/MIN/1.73 M^2
GLUCOSE SERPL-MCNC: 85 MG/DL (ref 70–110)
HCT VFR BLD AUTO: 43 % (ref 40–54)
HGB BLD-MCNC: 13.6 G/DL (ref 14–18)
MCH RBC QN AUTO: 29.7 PG (ref 27–31)
MCHC RBC AUTO-ENTMCNC: 31.6 G/DL (ref 32–36)
MCV RBC AUTO: 94 FL (ref 82–98)
PLATELET # BLD AUTO: 204 K/UL (ref 150–450)
PMV BLD AUTO: 11.6 FL (ref 9.2–12.9)
POTASSIUM SERPL-SCNC: 4.7 MMOL/L (ref 3.5–5.1)
PROT SERPL-MCNC: 7 G/DL (ref 6–8.4)
RBC # BLD AUTO: 4.58 M/UL (ref 4.6–6.2)
SODIUM SERPL-SCNC: 141 MMOL/L (ref 136–145)
TESTOST SERPL-MCNC: 1176 NG/DL (ref 304–1227)
WBC # BLD AUTO: 4.08 K/UL (ref 3.9–12.7)

## 2023-03-14 PROCEDURE — 84153 ASSAY OF PSA TOTAL: CPT | Performed by: UROLOGY

## 2023-03-14 PROCEDURE — 80053 COMPREHEN METABOLIC PANEL: CPT | Performed by: UROLOGY

## 2023-03-14 PROCEDURE — 36415 COLL VENOUS BLD VENIPUNCTURE: CPT | Mod: PO | Performed by: UROLOGY

## 2023-03-14 PROCEDURE — 84403 ASSAY OF TOTAL TESTOSTERONE: CPT | Performed by: UROLOGY

## 2023-03-14 PROCEDURE — 85027 COMPLETE CBC AUTOMATED: CPT | Performed by: UROLOGY

## 2023-03-16 ENCOUNTER — PATIENT MESSAGE (OUTPATIENT)
Dept: UROLOGY | Facility: CLINIC | Age: 53
End: 2023-03-16
Payer: OTHER GOVERNMENT

## 2023-03-21 ENCOUNTER — OFFICE VISIT (OUTPATIENT)
Dept: UROLOGY | Facility: CLINIC | Age: 53
End: 2023-03-21
Payer: OTHER GOVERNMENT

## 2023-03-21 DIAGNOSIS — E34.9 TESTOSTERONE DEFICIENCY: Primary | ICD-10-CM

## 2023-03-21 DIAGNOSIS — N52.9 VASCULOGENIC ERECTILE DYSFUNCTION, UNSPECIFIED VASCULOGENIC ERECTILE DYSFUNCTION TYPE: ICD-10-CM

## 2023-03-21 PROCEDURE — 99214 PR OFFICE/OUTPT VISIT, EST, LEVL IV, 30-39 MIN: ICD-10-PCS | Mod: 95,,, | Performed by: UROLOGY

## 2023-03-21 PROCEDURE — 99214 OFFICE O/P EST MOD 30 MIN: CPT | Mod: 95,,, | Performed by: UROLOGY

## 2023-03-21 RX ORDER — TESTOSTERONE CYPIONATE 200 MG/ML
160 INJECTION, SOLUTION INTRAMUSCULAR
Qty: 10 ML | Refills: 0 | Status: SHIPPED | OUTPATIENT
Start: 2023-03-21 | End: 2023-05-24 | Stop reason: SDUPTHER

## 2023-03-21 NOTE — PROGRESS NOTES
The patient location is: [place]  The chief complaint leading to consultation is: follow up testosterone    Visit type: audiovisual    Face to Face time with patient: 10 minutes  15 minutes of total time spent on the encounter, which includes face to face time and non-face to face time preparing to see the patient (eg, review of tests), Obtaining and/or reviewing separately obtained history, Documenting clinical information in the electronic or other health record, Independently interpreting results (not separately reported) and communicating results to the patient/family/caregiver, or Care coordination (not separately reported).         Each patient to whom he or she provides medical services by telemedicine is:  (1) informed of the relationship between the physician and patient and the respective role of any other health care provider with respect to management of the patient; and (2) notified that he or she may decline to receive medical services by telemedicine and may withdraw from such care at any time.    Notes:       CC: follow up low T      History of Present Illness:   Juan Bateman is a 52 y.o. male here for follow up.     3/21/23-giving himself injections Q14 days. Energy is good. No bothersome LUTS. Hasn't noticed a huge change. Libido is good. Erections have gotten better.   12/21/22-53yo male, here for evaluation of hypogonadism. Pt had 2 recent testosterone levels below 300. He does have SHAW. He is wearing his C-pap. He reports fatigue and lack of motivation, even to do things that he likes. He reports some ED since starting HTN meds. Takes viagra, which helps some. He reports low libido.        Review of Systems   Constitutional:  Negative for chills and fever.   Respiratory:  Negative for cough and shortness of breath.    Cardiovascular:  Negative for chest pain.   Gastrointestinal:  Negative for abdominal pain.   Genitourinary:  Negative for difficulty urinating.   All other  "systems reviewed and are negative.    Current Outpatient Medications   Medication Sig Dispense Refill    ALPRAZolam (XANAX) 0.25 MG tablet Take 1 tablet (0.25 mg total) by mouth 2 (two) times daily as needed for Anxiety. 60 tablet 0    amLODIPine (NORVASC) 5 MG tablet Take 1 tablet (5 mg total) by mouth once daily. 90 tablet 3    aspirin 81 mg Tab Take 1 tablet by mouth Daily.      atorvastatin (LIPITOR) 20 MG tablet Take 1 tablet (20 mg total) by mouth once daily. 90 tablet 1    celecoxib (CELEBREX) 200 MG capsule TAKE 1 CAPSULE DAILY 90 capsule 0    citalopram (CELEXA) 20 MG tablet Take 1 tablet (20 mg total) by mouth once daily. 90 tablet 3    finasteride (PROPECIA) 1 mg tablet Take 1 tablet (1 mg total) by mouth once daily. 90 tablet 3    latanoprost 0.005 % ophthalmic solution       metoprolol succinate (TOPROL-XL) 50 MG 24 hr tablet Take 1 tablet (50 mg total) by mouth once daily. 90 tablet 3    needle, disp, 18 G (HYPODERMIC NEEDLES) 18 gauge x 1 1/2" Ndle Use as directed 12 each 4    needle, disp, 23 gauge (EASY TOUCH HYPODERMIC NEEDLE) 23 gauge x 1 1/2" Ndle Use as directed 12 each 4    omeprazole (PRILOSEC) 40 MG capsule Take 1 capsule (40 mg total) by mouth 2 (two) times a day. 60 capsule 11    sildenafiL (VIAGRA) 50 MG tablet TAKE 1 TO 2 TABLETS 20 MINUTES PRIOR TO SEXUAL ACTIVITY 30 tablet 0    syringe, disposable, 3 mL Syrg Use as directed 12 each 4    testosterone cypionate (DEPOTESTOTERONE CYPIONATE) 200 mg/mL injection Inject 0.8 mLs (160 mg total) into the muscle every 14 (fourteen) days. 10 mL 0     No current facility-administered medications for this visit.     Facility-Administered Medications Ordered in Other Visits   Medication Dose Route Frequency Provider Last Rate Last Admin    lactated ringers infusion   Intravenous Continuous Paris Palm MD   Stopped at 06/28/22 0701       Review of patient's allergies indicates:  No Known Allergies    Past medical, family, and social " history reviewed as documented in chart with pertinent positive medical, family, and social history detailed in HPI.    Physical Exam  There were no vitals filed for this visit.    General: Well-developed, well-nourished, in no acute distress  HEENT: Normocephalic, atraumatic, extraocular eye movements in tact  Neck: trachea midline  Respirations: Even and unlabored  Extremities: Moves all extremities equally, atraumatic, no clubbing, cyanosis, edema  Skin: warm and dry, no lesions  Psych: normal affect  Neuro: Alert and oriented x 3. Cranial nerves II-XII grossly intact        Lab Results   Component Value Date    PSA 0.33 05/09/2022    PSA 0.19 03/15/2021    PSA 0.18 01/07/2019       Testosterone, Total   Date/Time Value Ref Range Status   03/14/2023 08:53 AM 1176 304 - 1227 ng/dL Final         Assessment:   1. Testosterone deficiency  CBC Without Differential    Comprehensive Metabolic Panel    Testosterone            Plan:  Testosterone deficiency  -     CBC Without Differential; Future; Expected date: 06/14/2023  -     Comprehensive Metabolic Panel; Future; Expected date: 06/14/2023  -     Testosterone; Future; Expected date: 06/14/2023    Other orders  -     testosterone cypionate (DEPOTESTOTERONE CYPIONATE) 200 mg/mL injection; Inject 0.8 mLs (160 mg total) into the muscle every 14 (fourteen) days.  Dispense: 10 mL; Refill: 0    Continue use of sildenafil PRN    Follow up in about 3 months (around 6/21/2023) for labs before visit.

## 2023-03-22 ENCOUNTER — TELEPHONE (OUTPATIENT)
Dept: UROLOGY | Facility: CLINIC | Age: 53
End: 2023-03-22
Payer: OTHER GOVERNMENT

## 2023-03-22 NOTE — TELEPHONE ENCOUNTER
----- Message from Capri Trevino MD sent at 3/21/2023  4:52 PM CDT -----  Please schedule pt for labs and a follow up in 3 months (can be a video visit)

## 2023-04-26 ENCOUNTER — PATIENT MESSAGE (OUTPATIENT)
Dept: INTERNAL MEDICINE | Facility: CLINIC | Age: 53
End: 2023-04-26
Payer: OTHER GOVERNMENT

## 2023-05-15 ENCOUNTER — PATIENT MESSAGE (OUTPATIENT)
Dept: INTERNAL MEDICINE | Facility: CLINIC | Age: 53
End: 2023-05-15
Payer: OTHER GOVERNMENT

## 2023-05-15 RX ORDER — FINASTERIDE 1 MG/1
TABLET, FILM COATED ORAL
Qty: 90 TABLET | Refills: 0 | Status: SHIPPED | OUTPATIENT
Start: 2023-05-15

## 2023-05-15 RX ORDER — CITALOPRAM 20 MG/1
TABLET, FILM COATED ORAL
Qty: 90 TABLET | Refills: 0 | Status: SHIPPED | OUTPATIENT
Start: 2023-05-15 | End: 2024-01-02

## 2023-05-15 NOTE — TELEPHONE ENCOUNTER
Refill Decision Note   Juan Bateman  is requesting a refill authorization.  Brief Assessment and Rationale for Refill:  Approve     Medication Therapy Plan:         Comments:     Note composed:12:36 PM 05/15/2023

## 2023-05-15 NOTE — TELEPHONE ENCOUNTER
No care due was identified.  [facility] Embedded Care Due Messages. Reference number: [id number].   5/15/2023 12:03:01 PM CDT

## 2023-05-15 NOTE — TELEPHONE ENCOUNTER
No care due was identified.  Health [facility] Embedded Care Due Messages. Reference number: [id number].   5/15/2023 11:50:52 AM CDT

## 2023-05-15 NOTE — TELEPHONE ENCOUNTER
Refill Decision Note   Juan Bateman  is requesting a refill authorization.  Brief Assessment and Rationale for Refill:  Approve     Medication Therapy Plan:         Comments:     Note composed:12:43 PM 05/15/2023

## 2023-05-22 ENCOUNTER — LAB VISIT (OUTPATIENT)
Dept: LAB | Facility: HOSPITAL | Age: 53
End: 2023-05-22
Attending: UROLOGY
Payer: OTHER GOVERNMENT

## 2023-05-22 DIAGNOSIS — E34.9 TESTOSTERONE DEFICIENCY: ICD-10-CM

## 2023-05-22 LAB
ALBUMIN SERPL BCP-MCNC: 4.1 G/DL (ref 3.5–5.2)
ALP SERPL-CCNC: 78 U/L (ref 55–135)
ALT SERPL W/O P-5'-P-CCNC: 24 U/L (ref 10–44)
ANION GAP SERPL CALC-SCNC: 12 MMOL/L (ref 8–16)
AST SERPL-CCNC: 24 U/L (ref 10–40)
BILIRUB SERPL-MCNC: 0.8 MG/DL (ref 0.1–1)
BUN SERPL-MCNC: 22 MG/DL (ref 6–20)
CALCIUM SERPL-MCNC: 10.1 MG/DL (ref 8.7–10.5)
CHLORIDE SERPL-SCNC: 99 MMOL/L (ref 95–110)
CO2 SERPL-SCNC: 28 MMOL/L (ref 23–29)
CREAT SERPL-MCNC: 0.8 MG/DL (ref 0.5–1.4)
ERYTHROCYTE [DISTWIDTH] IN BLOOD BY AUTOMATED COUNT: 13.2 % (ref 11.5–14.5)
EST. GFR  (NO RACE VARIABLE): >60 ML/MIN/1.73 M^2
GLUCOSE SERPL-MCNC: 83 MG/DL (ref 70–110)
HCT VFR BLD AUTO: 45.4 % (ref 40–54)
HGB BLD-MCNC: 14.4 G/DL (ref 14–18)
MCH RBC QN AUTO: 29.6 PG (ref 27–31)
MCHC RBC AUTO-ENTMCNC: 31.7 G/DL (ref 32–36)
MCV RBC AUTO: 93 FL (ref 82–98)
PLATELET # BLD AUTO: 198 K/UL (ref 150–450)
PMV BLD AUTO: 11.1 FL (ref 9.2–12.9)
POTASSIUM SERPL-SCNC: 4.8 MMOL/L (ref 3.5–5.1)
PROT SERPL-MCNC: 7.2 G/DL (ref 6–8.4)
RBC # BLD AUTO: 4.87 M/UL (ref 4.6–6.2)
SODIUM SERPL-SCNC: 139 MMOL/L (ref 136–145)
TESTOST SERPL-MCNC: 1303 NG/DL (ref 304–1227)
WBC # BLD AUTO: 7.96 K/UL (ref 3.9–12.7)

## 2023-05-22 PROCEDURE — 36415 COLL VENOUS BLD VENIPUNCTURE: CPT | Mod: PO | Performed by: UROLOGY

## 2023-05-22 PROCEDURE — 80053 COMPREHEN METABOLIC PANEL: CPT | Performed by: UROLOGY

## 2023-05-22 PROCEDURE — 84403 ASSAY OF TOTAL TESTOSTERONE: CPT | Performed by: UROLOGY

## 2023-05-22 PROCEDURE — 85027 COMPLETE CBC AUTOMATED: CPT | Performed by: UROLOGY

## 2023-05-24 ENCOUNTER — PATIENT MESSAGE (OUTPATIENT)
Dept: UROLOGY | Facility: CLINIC | Age: 53
End: 2023-05-24

## 2023-05-24 ENCOUNTER — OFFICE VISIT (OUTPATIENT)
Dept: UROLOGY | Facility: CLINIC | Age: 53
End: 2023-05-24
Payer: OTHER GOVERNMENT

## 2023-05-24 DIAGNOSIS — I10 PRIMARY HYPERTENSION: ICD-10-CM

## 2023-05-24 DIAGNOSIS — E34.9 TESTOSTERONE DEFICIENCY: Primary | ICD-10-CM

## 2023-05-24 PROCEDURE — 99214 OFFICE O/P EST MOD 30 MIN: CPT | Mod: 95,,, | Performed by: UROLOGY

## 2023-05-24 PROCEDURE — 99214 PR OFFICE/OUTPT VISIT, EST, LEVL IV, 30-39 MIN: ICD-10-PCS | Mod: 95,,, | Performed by: UROLOGY

## 2023-05-24 RX ORDER — TESTOSTERONE CYPIONATE 200 MG/ML
160 INJECTION, SOLUTION INTRAMUSCULAR
Qty: 10 ML | Refills: 1 | Status: SHIPPED | OUTPATIENT
Start: 2023-05-24 | End: 2023-07-17 | Stop reason: SDUPTHER

## 2023-05-24 NOTE — Clinical Note
Please schedule testosterone level to be drawn on 5/31/23 at the [place] lab. Then, schedule a 6 month follow up with me with Testosterone, PSA, CMP and CBC before that visit.

## 2023-05-24 NOTE — PROGRESS NOTES
The patient location is: [place]  The chief complaint leading to consultation is: follow up testosterone    Visit type: audiovisual    Face to Face time with patient: 10 minutes  15 minutes of total time spent on the encounter, which includes face to face time and non-face to face time preparing to see the patient (eg, review of tests), Obtaining and/or reviewing separately obtained history, Documenting clinical information in the electronic or other health record, Independently interpreting results (not separately reported) and communicating results to the patient/family/caregiver, or Care coordination (not separately reported).         Each patient to whom he or she provides medical services by telemedicine is:  (1) informed of the relationship between the physician and patient and the respective role of any other health care provider with respect to management of the patient; and (2) notified that he or she may decline to receive medical services by telemedicine and may withdraw from such care at any time.    Notes:       CC: follow up low T      History of Present Illness:   Juan Bateman is a 52 y.o. male here for follow up.     5/24/23- T level a little high at 1303. Pt injecting 160mg IM Q14 days. Last injection was 3 day prior to labs. Pt reports that his blood pressure was slightly elevated at a recent physical exam. Energy is good. Libido is good.   3/21/23-giving himself injections Q14 days. Energy is good. No bothersome LUTS. Hasn't noticed a huge change. Libido is good. Erections have gotten better.   12/21/22-53yo male, here for evaluation of hypogonadism. Pt had 2 recent testosterone levels below 300. He does have SHAW. He is wearing his C-pap. He reports fatigue and lack of motivation, even to do things that he likes. He reports some ED since starting HTN meds. Takes viagra, which helps some. He reports low libido.        Review of Systems   Constitutional:  Negative for chills and  "fever.   Respiratory:  Negative for cough and shortness of breath.    Cardiovascular:  Negative for chest pain.   Gastrointestinal:  Negative for abdominal pain.   Genitourinary:  Negative for difficulty urinating.   All other systems reviewed and are negative.    Current Outpatient Medications   Medication Sig Dispense Refill    citalopram (CELEXA) 20 MG tablet TAKE 1 TABLET DAILY 90 tablet 0    ALPRAZolam (XANAX) 0.25 MG tablet Take 1 tablet (0.25 mg total) by mouth 2 (two) times daily as needed for Anxiety. 60 tablet 0    amLODIPine (NORVASC) 5 MG tablet Take 1 tablet (5 mg total) by mouth once daily. 90 tablet 3    aspirin 81 mg Tab Take 1 tablet by mouth Daily.      atorvastatin (LIPITOR) 20 MG tablet Take 1 tablet (20 mg total) by mouth once daily. 90 tablet 1    celecoxib (CELEBREX) 200 MG capsule TAKE 1 CAPSULE DAILY 90 capsule 0    finasteride (PROPECIA) 1 mg tablet TAKE ONE TABLET BY MOUTH EVERY DAY 90 tablet 0    latanoprost 0.005 % ophthalmic solution       metoprolol succinate (TOPROL-XL) 50 MG 24 hr tablet Take 1 tablet (50 mg total) by mouth once daily. 90 tablet 3    needle, disp, 18 G (HYPODERMIC NEEDLES) 18 gauge x 1 1/2" Ndle Use as directed 12 each 4    needle, disp, 23 gauge (EASY TOUCH HYPODERMIC NEEDLE) 23 gauge x 1 1/2" Ndle Use as directed 12 each 4    omeprazole (PRILOSEC) 40 MG capsule Take 1 capsule (40 mg total) by mouth 2 (two) times a day. 60 capsule 11    sildenafiL (VIAGRA) 50 MG tablet TAKE 1 TO 2 TABLETS 20 MINUTES PRIOR TO SEXUAL ACTIVITY 30 tablet 0    syringe, disposable, 3 mL Syrg Use as directed 12 each 4    testosterone cypionate (DEPOTESTOTERONE CYPIONATE) 200 mg/mL injection Inject 0.8 mLs (160 mg total) into the muscle every 14 (fourteen) days. 10 mL 1     No current facility-administered medications for this visit.     Facility-Administered Medications Ordered in Other Visits   Medication Dose Route Frequency Provider Last Rate Last Admin    lactated ringers infusion   " Intravenous Continuous Paris Palm MD   Stopped at 06/28/22 0701       Review of patient's allergies indicates:  No Known Allergies    Past medical, family, and social history reviewed as documented in chart with pertinent positive medical, family, and social history detailed in HPI.    Physical Exam  There were no vitals filed for this visit.    General: Well-developed, well-nourished, in no acute distress  HEENT: Normocephalic, atraumatic, extraocular eye movements in tact  Neck: trachea midline  Respirations: Even and unlabored  Extremities: Moves all extremities equally, atraumatic, no clubbing, cyanosis, edema  Skin: warm and dry, no lesions  Psych: normal affect  Neuro: Alert and oriented x 3. Cranial nerves II-XII grossly intact        Lab Results   Component Value Date    PSA 0.33 05/09/2022    PSA 0.19 03/15/2021    PSA 0.18 01/07/2019       Testosterone, Total   Date/Time Value Ref Range Status   05/22/2023 09:02 AM 1303 (H) 304 - 1227 ng/dL Final         Assessment:   1. Testosterone deficiency  Testosterone    CBC Without Differential    Comprehensive Metabolic Panel    Prostate Specific Antigen, Diagnostic    Testosterone      2. Primary hypertension                Plan:  Testosterone deficiency  -     Testosterone; Future; Expected date: 05/31/2023  -     CBC Without Differential; Future; Expected date: 11/16/2023  -     Comprehensive Metabolic Panel; Future; Expected date: 11/16/2023  -     Prostate Specific Antigen, Diagnostic; Future; Expected date: 11/16/2023  -     Testosterone; Future; Expected date: 11/16/2023    Primary hypertension    Other orders  -     testosterone cypionate (DEPOTESTOTERONE CYPIONATE) 200 mg/mL injection; Inject 0.8 mLs (160 mg total) into the muscle every 14 (fourteen) days.  Dispense: 10 mL; Refill: 1    Will get trough level next week  Pt instructed on taking his BP at home and keeping a log      Follow up in about 6 months (around 11/24/2023) for labs  before visit.

## 2023-05-25 ENCOUNTER — PATIENT MESSAGE (OUTPATIENT)
Dept: UROLOGY | Facility: CLINIC | Age: 53
End: 2023-05-25
Payer: OTHER GOVERNMENT

## 2023-05-31 ENCOUNTER — LAB VISIT (OUTPATIENT)
Dept: LAB | Facility: HOSPITAL | Age: 53
End: 2023-05-31
Attending: UROLOGY
Payer: OTHER GOVERNMENT

## 2023-05-31 DIAGNOSIS — E34.9 TESTOSTERONE DEFICIENCY: ICD-10-CM

## 2023-05-31 LAB — TESTOST SERPL-MCNC: 515 NG/DL (ref 304–1227)

## 2023-05-31 PROCEDURE — 36415 COLL VENOUS BLD VENIPUNCTURE: CPT | Mod: PO | Performed by: UROLOGY

## 2023-05-31 PROCEDURE — 84403 ASSAY OF TOTAL TESTOSTERONE: CPT | Performed by: UROLOGY

## 2023-06-08 ENCOUNTER — OFFICE VISIT (OUTPATIENT)
Dept: INTERNAL MEDICINE | Facility: CLINIC | Age: 53
End: 2023-06-08
Payer: OTHER GOVERNMENT

## 2023-06-08 VITALS
DIASTOLIC BLOOD PRESSURE: 80 MMHG | WEIGHT: 268.06 LBS | HEART RATE: 78 BPM | SYSTOLIC BLOOD PRESSURE: 130 MMHG | BODY MASS INDEX: 36.31 KG/M2 | HEIGHT: 72 IN | TEMPERATURE: 98 F

## 2023-06-08 DIAGNOSIS — E78.2 MIXED HYPERLIPIDEMIA: ICD-10-CM

## 2023-06-08 DIAGNOSIS — Z00.00 ROUTINE GENERAL MEDICAL EXAMINATION AT HEALTH CARE FACILITY: Primary | ICD-10-CM

## 2023-06-08 DIAGNOSIS — I10 PRIMARY HYPERTENSION: ICD-10-CM

## 2023-06-08 DIAGNOSIS — G47.33 OSA ON CPAP: ICD-10-CM

## 2023-06-08 PROCEDURE — 99999 PR PBB SHADOW E&M-EST. PATIENT-LVL V: CPT | Mod: PBBFAC,,, | Performed by: INTERNAL MEDICINE

## 2023-06-08 PROCEDURE — 99215 OFFICE O/P EST HI 40 MIN: CPT | Mod: PBBFAC,PO | Performed by: INTERNAL MEDICINE

## 2023-06-08 PROCEDURE — 99396 PREV VISIT EST AGE 40-64: CPT | Mod: S$PBB,,, | Performed by: INTERNAL MEDICINE

## 2023-06-08 PROCEDURE — 99999 PR PBB SHADOW E&M-EST. PATIENT-LVL V: ICD-10-PCS | Mod: PBBFAC,,, | Performed by: INTERNAL MEDICINE

## 2023-06-08 PROCEDURE — 99396 PR PREVENTIVE VISIT,EST,40-64: ICD-10-PCS | Mod: S$PBB,,, | Performed by: INTERNAL MEDICINE

## 2023-06-08 NOTE — PROGRESS NOTES
Subjective:       Patient ID: Juan Bateman is a 52 y.o. male.    Chief Complaint: Annual Exam      HPI:  Patient is a 52-year-old male presenting today for updated physical exam review of chronic health issues.  Patient has history of hypertension, hyperlipidemia, sleep apnea.  He indicates he has been doing well.  He notes his CPAP uses really improved a lot and he is glad that he is back using it regularly he had to switch to a nasal pillow and that really made a difference for him.  He feels like his blood pressures have been doing fine he has been taking his medications as prescribed.  He notes no acute complaints and no recent hospitalizations or ER visits.  He continues to follow with Dr. Trevino in regards to testosterone issues.    Review of Systems   Constitutional:  Negative for fever and unexpected weight change.   HENT:  Negative for hearing loss, postnasal drip and rhinorrhea.    Eyes:  Negative for pain and visual disturbance.   Respiratory:  Negative for cough, shortness of breath and wheezing.    Cardiovascular:  Negative for chest pain and palpitations.   Gastrointestinal:  Negative for constipation, diarrhea, nausea and vomiting.   Genitourinary:  Negative for dysuria and hematuria.   Musculoskeletal:  Negative for arthralgias, back pain, myalgias and neck stiffness.   Skin:  Negative for pallor and rash.   Neurological:  Negative for seizures, syncope and headaches.   Hematological:  Negative for adenopathy.   Psychiatric/Behavioral:  Negative for dysphoric mood. The patient is not nervous/anxious.      Objective:   /80   Pulse 78   Temp 97.9 °F (36.6 °C)   Ht 6' (1.829 m)   Wt 121.6 kg (268 lb 1.3 oz)   BMI 36.36 kg/m²      Physical Exam  Vitals reviewed.   Constitutional:       General: He is not in acute distress.     Appearance: He is well-developed.   HENT:      Head: Normocephalic and atraumatic.      Right Ear: Tympanic membrane and ear canal normal.      Left Ear:  Tympanic membrane and ear canal normal.   Eyes:      Pupils: Pupils are equal, round, and reactive to light.   Neck:      Thyroid: No thyromegaly.      Vascular: No JVD.   Cardiovascular:      Rate and Rhythm: Normal rate and regular rhythm.      Heart sounds: Normal heart sounds. No murmur heard.    No friction rub. No gallop.   Pulmonary:      Effort: Pulmonary effort is normal.      Breath sounds: Normal breath sounds. No wheezing or rales.   Abdominal:      General: Bowel sounds are normal. There is no distension.      Palpations: Abdomen is soft.      Tenderness: There is no abdominal tenderness. There is no guarding or rebound.   Musculoskeletal:         General: Normal range of motion.      Cervical back: Normal range of motion and neck supple.   Lymphadenopathy:      Cervical: No cervical adenopathy.   Skin:     General: Skin is warm and dry.      Findings: No rash.   Neurological:      General: No focal deficit present.      Mental Status: He is alert and oriented to person, place, and time.      Cranial Nerves: No cranial nerve deficit.      Deep Tendon Reflexes: Reflexes are normal and symmetric.   Psychiatric:         Mood and Affect: Mood normal.         Judgment: Judgment normal.       Lab Visit on 05/31/2023   Component Date Value    Testosterone, Total 05/31/2023 515        Assessment:       1. Routine general medical examination at health care facility    2. Primary hypertension    3. Mixed hyperlipidemia    4. SHAW on CPAP        Plan:   HTN (hypertension)  Blood pressure is under good control.  We will continue the current regimen.  Will work on regular aerobic exercise and a low salt diet.        Hyperlipidemia  Cholesterol numbers look good.  We will continue the current regimen at this time.  Remain focused on low fat diet and high dietary fiber intake.      SHAW on CPAP  Patient is compliant with use of cpap, using it the majority of nights.  Benefit from the use of the device is  noted.    Routine general medical examination at health care facility  Comments:  Focus on good health habits, low fat diet, regular exercise, seatbelt use, sunscreen use  Orders:  -     Lipid Panel; Future; Expected date: 06/09/2023  -     Hepatitis C Antibody; Future; Expected date: 06/09/2023  -     Hemoglobin A1C; Future; Expected date: 06/09/2023    Primary hypertension  -     Hypertension Digital Medicine (HDMP) Enrollment Order  -     Hypertension Digital Medicine (HDMP): Assign Onboarding Questionnaires    Mixed hyperlipidemia    SHAW on CPAP          Follow up in about 1 year (around 6/8/2024).

## 2023-06-09 ENCOUNTER — LAB VISIT (OUTPATIENT)
Dept: LAB | Facility: HOSPITAL | Age: 53
End: 2023-06-09
Attending: INTERNAL MEDICINE
Payer: OTHER GOVERNMENT

## 2023-06-09 ENCOUNTER — PATIENT MESSAGE (OUTPATIENT)
Dept: ADMINISTRATIVE | Facility: OTHER | Age: 53
End: 2023-06-09
Payer: OTHER GOVERNMENT

## 2023-06-09 ENCOUNTER — PATIENT MESSAGE (OUTPATIENT)
Dept: INTERNAL MEDICINE | Facility: CLINIC | Age: 53
End: 2023-06-09
Payer: OTHER GOVERNMENT

## 2023-06-09 DIAGNOSIS — Z00.00 ROUTINE GENERAL MEDICAL EXAMINATION AT HEALTH CARE FACILITY: ICD-10-CM

## 2023-06-09 LAB
CHOLEST SERPL-MCNC: 137 MG/DL (ref 120–199)
CHOLEST/HDLC SERPL: 3.4 {RATIO} (ref 2–5)
ESTIMATED AVG GLUCOSE: 111 MG/DL (ref 68–131)
HBA1C MFR BLD: 5.5 % (ref 4–5.6)
HCV AB SERPL QL IA: NORMAL
HDLC SERPL-MCNC: 40 MG/DL (ref 40–75)
HDLC SERPL: 29.2 % (ref 20–50)
LDLC SERPL CALC-MCNC: 81.2 MG/DL (ref 63–159)
NONHDLC SERPL-MCNC: 97 MG/DL
TRIGL SERPL-MCNC: 79 MG/DL (ref 30–150)

## 2023-06-09 PROCEDURE — 36415 COLL VENOUS BLD VENIPUNCTURE: CPT | Mod: PO | Performed by: INTERNAL MEDICINE

## 2023-06-09 PROCEDURE — 86803 HEPATITIS C AB TEST: CPT | Performed by: INTERNAL MEDICINE

## 2023-06-09 PROCEDURE — 80061 LIPID PANEL: CPT | Performed by: INTERNAL MEDICINE

## 2023-06-09 PROCEDURE — 83036 HEMOGLOBIN GLYCOSYLATED A1C: CPT | Performed by: INTERNAL MEDICINE

## 2023-07-05 ENCOUNTER — PATIENT MESSAGE (OUTPATIENT)
Dept: INTERNAL MEDICINE | Facility: CLINIC | Age: 53
End: 2023-07-05
Payer: OTHER GOVERNMENT

## 2023-07-07 ENCOUNTER — OFFICE VISIT (OUTPATIENT)
Dept: INTERNAL MEDICINE | Facility: CLINIC | Age: 53
End: 2023-07-07
Payer: OTHER GOVERNMENT

## 2023-07-07 DIAGNOSIS — G47.00 INSOMNIA, UNSPECIFIED TYPE: Primary | ICD-10-CM

## 2023-07-07 PROCEDURE — 99213 PR OFFICE/OUTPT VISIT, EST, LEVL III, 20-29 MIN: ICD-10-PCS | Mod: 95,,, | Performed by: NURSE PRACTITIONER

## 2023-07-07 PROCEDURE — 99213 OFFICE O/P EST LOW 20 MIN: CPT | Mod: 95,,, | Performed by: NURSE PRACTITIONER

## 2023-07-07 RX ORDER — TRAZODONE HYDROCHLORIDE 50 MG/1
50 TABLET ORAL NIGHTLY
Qty: 30 TABLET | Refills: 2 | Status: SHIPPED | OUTPATIENT
Start: 2023-07-07 | End: 2023-10-10

## 2023-07-07 NOTE — PROGRESS NOTES
Subjective:       Patient ID: Juan Bateman is a 53 y.o. male.    Chief Complaint: Insomnia    The patient location is: home  The chief complaint leading to consultation is: trouble sleeping    Visit type: audiovisual    Face to Face time with patient: 10  20 minutes of total time spent on the encounter, which includes face to face time and non-face to face time preparing to see the patient (eg, review of tests), Obtaining and/or reviewing separately obtained history, Documenting clinical information in the electronic or other health record, Independently interpreting results (not separately reported) and communicating results to the patient/family/caregiver, or Care coordination (not separately reported).         Each patient to whom he or she provides medical services by telemedicine is:  (1) informed of the relationship between the physician and patient and the respective role of any other health care provider with respect to management of the patient; and (2) notified that he or she may decline to receive medical services by telemedicine and may withdraw from such care at any time.    Notes:   Pt seen virtually for trouble sleeping  Wears cpap but still having trouble sleeping- doesn't feel this is causing the problems   Trouble falling asleep, and staying asleep  Tried good sleep hygiene  Tried OTC sleepaid, Nyquil without relief   Having afternoon fatigue due to the lack of night time sleep      There were no vitals taken for this visit.    Review of Systems   Constitutional:  Negative for activity change and unexpected weight change.   HENT:  Negative for hearing loss, rhinorrhea and trouble swallowing.    Eyes:  Negative for discharge and visual disturbance.   Respiratory:  Negative for chest tightness and wheezing.    Cardiovascular:  Negative for chest pain and palpitations.   Gastrointestinal:  Negative for blood in stool, constipation, diarrhea and vomiting.   Endocrine: Negative for polydipsia  and polyuria.   Genitourinary:  Negative for difficulty urinating, hematuria and urgency.   Musculoskeletal:  Negative for arthralgias, joint swelling and neck pain.   Neurological:  Negative for weakness and headaches.   Psychiatric/Behavioral:  Positive for sleep disturbance. Negative for confusion and dysphoric mood.      Objective:      Physical Exam  Constitutional:       General: He is not in acute distress.     Appearance: He is well-developed. He is not ill-appearing, toxic-appearing or diaphoretic.   HENT:      Head: Normocephalic and atraumatic.      Right Ear: External ear normal.      Left Ear: External ear normal.      Nose: Nose normal.   Eyes:      General: Lids are normal. No scleral icterus.        Right eye: No discharge.         Left eye: No discharge.      Conjunctiva/sclera: Conjunctivae normal.   Pulmonary:      Effort: Pulmonary effort is normal. No tachypnea, accessory muscle usage or respiratory distress.      Breath sounds: No stridor.   Musculoskeletal:      Cervical back: Full passive range of motion without pain.   Skin:     Coloration: Skin is not pale.   Neurological:      Mental Status: He is alert. He is not disoriented.   Psychiatric:         Attention and Perception: He is attentive.         Mood and Affect: Mood is not anxious or depressed. Affect is not labile, blunt, angry or inappropriate.         Speech: Speech normal.         Behavior: Behavior normal.         Thought Content: Thought content normal.         Judgment: Judgment normal.       Assessment:       1. Insomnia, unspecified type        Plan:       Juan was seen today for insomnia.    Diagnoses and all orders for this visit:    Insomnia, unspecified type  -     traZODone (DESYREL) 50 MG tablet; Take 1 tablet (50 mg total) by mouth every evening.      Good sleep hygiene discussed   Trial of trazodone discussed  Follow up for worsening or no improvement in symptoms and PRN.

## 2023-07-14 ENCOUNTER — PATIENT MESSAGE (OUTPATIENT)
Dept: UROLOGY | Facility: CLINIC | Age: 53
End: 2023-07-14
Payer: OTHER GOVERNMENT

## 2023-07-17 RX ORDER — TESTOSTERONE CYPIONATE 200 MG/ML
160 INJECTION, SOLUTION INTRAMUSCULAR
Qty: 2 ML | Refills: 4 | Status: SHIPPED | OUTPATIENT
Start: 2023-07-17 | End: 2024-01-15

## 2023-08-11 RX ORDER — ATORVASTATIN CALCIUM 20 MG/1
20 TABLET, FILM COATED ORAL
Qty: 90 TABLET | Refills: 3 | Status: SHIPPED | OUTPATIENT
Start: 2023-08-11

## 2023-08-11 NOTE — TELEPHONE ENCOUNTER
No care due was identified.  Health [facility] Embedded Care Due Messages. Reference number: [id number].   8/11/2023 2:41:42 PM CDT

## 2023-08-12 NOTE — TELEPHONE ENCOUNTER
Refill Decision Note   Juan Bateman  is requesting a refill authorization.  Brief Assessment and Rationale for Refill:  Approve     Medication Therapy Plan:         Comments:     Note composed:7:29 PM 08/11/2023

## 2023-09-09 DIAGNOSIS — G89.29 CHRONIC LEFT-SIDED LOW BACK PAIN WITHOUT SCIATICA: ICD-10-CM

## 2023-09-09 DIAGNOSIS — M54.50 CHRONIC LEFT-SIDED LOW BACK PAIN WITHOUT SCIATICA: ICD-10-CM

## 2023-09-09 NOTE — TELEPHONE ENCOUNTER
No care due was identified.  [facility] Embedded Care Due Messages. Reference number: [id number].   9/09/2023 1:11:43 PM CDT

## 2023-09-10 RX ORDER — AMLODIPINE BESYLATE 5 MG/1
TABLET ORAL
Qty: 90 TABLET | Refills: 3 | Status: SHIPPED | OUTPATIENT
Start: 2023-09-10

## 2023-09-10 RX ORDER — METOPROLOL SUCCINATE 50 MG/1
TABLET, EXTENDED RELEASE ORAL
Qty: 90 TABLET | Refills: 3 | Status: SHIPPED | OUTPATIENT
Start: 2023-09-10

## 2023-09-10 NOTE — TELEPHONE ENCOUNTER
Refill Decision Note   Juan Bateman  is requesting a refill authorization.  Brief Assessment and Rationale for Refill:  Approve     Medication Therapy Plan:         Comments:     Note composed:9:35 AM 09/10/2023             Appointments     Last Visit   6/8/2023 Nigel Naranjo MD   Next Visit   Visit date not found Nigel Naranjo MD

## 2023-09-11 RX ORDER — CELECOXIB 200 MG/1
CAPSULE ORAL
Qty: 90 CAPSULE | Refills: 3 | Status: SHIPPED | OUTPATIENT
Start: 2023-09-11

## 2023-10-03 ENCOUNTER — OFFICE VISIT (OUTPATIENT)
Dept: DERMATOLOGY | Facility: CLINIC | Age: 53
End: 2023-10-03
Payer: OTHER GOVERNMENT

## 2023-10-03 DIAGNOSIS — B35.3 TINEA PEDIS OF BOTH FEET: ICD-10-CM

## 2023-10-03 DIAGNOSIS — L98.9 DERMATOSIS: Primary | ICD-10-CM

## 2023-10-03 PROCEDURE — 99204 PR OFFICE/OUTPT VISIT, NEW, LEVL IV, 45-59 MIN: ICD-10-PCS | Mod: 95,,, | Performed by: DERMATOLOGY

## 2023-10-03 PROCEDURE — 99204 OFFICE O/P NEW MOD 45 MIN: CPT | Mod: 95,,, | Performed by: DERMATOLOGY

## 2023-10-03 RX ORDER — NAFTIFINE HYDROCHLORIDE 20 MG/G
GEL TOPICAL
Qty: 60 G | Refills: 3 | Status: SHIPPED | OUTPATIENT
Start: 2023-10-03

## 2023-10-03 RX ORDER — TERBINAFINE HYDROCHLORIDE 250 MG/1
TABLET ORAL
Qty: 30 TABLET | Refills: 0 | Status: SHIPPED | OUTPATIENT
Start: 2023-10-03

## 2023-10-03 NOTE — PROGRESS NOTES
Subjective:      Patient ID:  Juan Bateman is a 53 y.o. male who presents for No chief complaint on file.    The patient location is: home  The chief complaint leading to consultation is: rash left 2nd finger    Visit type: audiovisual    Face to Face time with patient: 15 min  20 minutes of total time spent on the encounter, which includes face to face time and non-face to face time preparing to see the patient (eg, review of tests), Obtaining and/or reviewing separately obtained history, Documenting clinical information in the electronic or other health record, Independently interpreting results (not separately reported) and communicating results to the patient/family/caregiver, or Care coordination (not separately reported).         Each patient to whom he or she provides medical services by telemedicine is:  (1) informed of the relationship between the physician and patient and the respective role of any other health care provider with respect to management of the patient; and (2) notified that he or she may decline to receive medical services by telemedicine and may withdraw from such care at any time.    Notes:     Hx of EIC of the left shoulder, here today for new issue:    History of Present Illness: The patient presents with chief complaint of rash.  Location: left 2nd digit  Duration: 1 year  Signs/Symptoms: scaly, thickened crust    Prior treatments: wart remover          Review of Systems   Constitutional:  Negative for fever and chills.   Gastrointestinal:  Negative for nausea and vomiting.   Skin:  Positive for rash, dry skin and activity-related sunscreen use. Negative for itching, daily sunscreen use and recent sunburn.   Hematologic/Lymphatic: Does not bruise/bleed easily.       Objective:   Physical Exam   Constitutional: He appears well-developed and well-nourished. No distress.   Neurological: He is alert and oriented to person, place, and time. He is not disoriented.   Psychiatric:  He has a normal mood and affect.   Skin:   Areas Examined (abnormalities noted in diagram):   Head / Face Inspection Performed  Neck Inspection Performed  RUE Inspected  LUE Inspection Performed  Nails and Digits Inspection Performed               Diagram Legend     Erythematous scaling macule/papule c/w actinic keratosis       Vascular papule c/w angioma      Pigmented verrucoid papule/plaque c/w seborrheic keratosis      Yellow umbilicated papule c/w sebaceous hyperplasia      Irregularly shaped tan macule c/w lentigo     1-2 mm smooth white papules consistent with Milia      Movable subcutaneous cyst with punctum c/w epidermal inclusion cyst      Subcutaneous movable cyst c/w pilar cyst      Firm pink to brown papule c/w dermatofibroma      Pedunculated fleshy papule(s) c/w skin tag(s)      Evenly pigmented macule c/w junctional nevus     Mildly variegated pigmented, slightly irregular-bordered macule c/w mildly atypical nevus      Flesh colored to evenly pigmented papule c/w intradermal nevus       Pink pearly papule/plaque c/w basal cell carcinoma      Erythematous hyperkeratotic cursted plaque c/w SCC      Surgical scar with no sign of skin cancer recurrence      Open and closed comedones      Inflammatory papules and pustules      Verrucoid papule consistent consistent with wart     Erythematous eczematous patches and plaques     Dystrophic onycholytic nail with subungual debris c/w onychomycosis     Umbilicated papule    Erythematous-base heme-crusted tan verrucoid plaque consistent with inflamed seborrheic keratosis     Erythematous Silvery Scaling Plaque c/w Psoriasis     See annotation              Assessment / Plan:        Dermatosis  Tinea pedis  -     naftifine (NAFTIN) 2 % Gel; AAA bid x 1 month  Dispense: 60 g; Refill: 3  -     terbinafine HCL (LAMISIL) 250 mg tablet; Take 1 po qday  Dispense: 30 tablet; Refill: 0  -     given foot involvement, suspect tinea pedis and tinea manus.  Discussed fomite  reduction. We will start above medications.  Recommend patient follow up in person if fails to improve.  Discussed rare risk for hepatotoxicity with oral Lamisil.  Discussed patient should abstain from alcohol use and use of Tylenol/acetaminophen while on oral Lamisil.  The patient acknowledged understanding.           Follow up in about 3 months (around 1/3/2024).

## 2023-10-03 NOTE — PATIENT INSTRUCTIONS
Start water-vinegar soaks 3 nights/week on feet for 5-10 minutes    Start naftin gel twice daily    Launder socks in hot water    Wear open toe shoes when possible    Wear socks with closed toe shoes    Spray shoes 3 times/week with over the counter antifungal foot spray (ie-Tenactin)

## 2023-10-10 ENCOUNTER — OFFICE VISIT (OUTPATIENT)
Dept: INTERNAL MEDICINE | Facility: CLINIC | Age: 53
End: 2023-10-10
Payer: OTHER GOVERNMENT

## 2023-10-10 ENCOUNTER — HOSPITAL ENCOUNTER (OUTPATIENT)
Dept: RADIOLOGY | Facility: HOSPITAL | Age: 53
Discharge: HOME | End: 2023-10-10
Attending: NURSE PRACTITIONER
Payer: OTHER GOVERNMENT

## 2023-10-10 VITALS
SYSTOLIC BLOOD PRESSURE: 138 MMHG | HEART RATE: 87 BPM | DIASTOLIC BLOOD PRESSURE: 80 MMHG | TEMPERATURE: 98 F | BODY MASS INDEX: 37.31 KG/M2 | WEIGHT: 275.13 LBS | OXYGEN SATURATION: 97 %

## 2023-10-10 DIAGNOSIS — E66.01 CLASS 2 SEVERE OBESITY DUE TO EXCESS CALORIES WITH SERIOUS COMORBIDITY AND BODY MASS INDEX (BMI) OF 37.0 TO 37.9 IN ADULT: ICD-10-CM

## 2023-10-10 DIAGNOSIS — R43.9 SENSE OF SMELL ALTERED: ICD-10-CM

## 2023-10-10 DIAGNOSIS — R43.9 SENSE OF SMELL ALTERED: Primary | ICD-10-CM

## 2023-10-10 DIAGNOSIS — I10 PRIMARY HYPERTENSION: ICD-10-CM

## 2023-10-10 DIAGNOSIS — G47.33 OSA ON CPAP: ICD-10-CM

## 2023-10-10 PROCEDURE — 99214 PR OFFICE/OUTPT VISIT, EST, LEVL IV, 30-39 MIN: ICD-10-PCS | Mod: S$PBB,,, | Performed by: NURSE PRACTITIONER

## 2023-10-10 PROCEDURE — 70220 XR SINUSES MIN 3 VIEWS: ICD-10-PCS | Mod: 26,,, | Performed by: RADIOLOGY

## 2023-10-10 PROCEDURE — 99999 PR PBB SHADOW E&M-EST. PATIENT-LVL V: ICD-10-PCS | Mod: PBBFAC,,, | Performed by: NURSE PRACTITIONER

## 2023-10-10 PROCEDURE — 99214 OFFICE O/P EST MOD 30 MIN: CPT | Mod: S$PBB,,, | Performed by: NURSE PRACTITIONER

## 2023-10-10 PROCEDURE — 70220 X-RAY EXAM OF SINUSES: CPT | Mod: TC,FY,PO

## 2023-10-10 PROCEDURE — 99999 PR PBB SHADOW E&M-EST. PATIENT-LVL V: CPT | Mod: PBBFAC,,, | Performed by: NURSE PRACTITIONER

## 2023-10-10 PROCEDURE — 99215 OFFICE O/P EST HI 40 MIN: CPT | Mod: PBBFAC,PO | Performed by: NURSE PRACTITIONER

## 2023-10-10 PROCEDURE — 70220 X-RAY EXAM OF SINUSES: CPT | Mod: 26,,, | Performed by: RADIOLOGY

## 2023-10-10 NOTE — PROGRESS NOTES
Subjective:       Patient ID: Juan Bateman is a 53 y.o. male.    Chief Complaint: weird smells    Pt presents to clinic today for having phantom smells  Started about 6 weeks   Started randomly smelling foul smelling things   Describes the smell metallic, onion smell   Thought maybe it was a sinus issue   Started using Flonase and nasal rinses without relief   It has become bothersome and would like to get to the bottom of this    Pt also has some interest in GLP1 meds for weight loss  He knows he needs to lose weight  Trying to eat healthy  Seen some friends on ozempic with good results  There is no history of multiple endocrine neoplasia   there is no history of thyroid cancer, gastroparesis, or pancreatitis in the patient.  We discussed the effects and side effects of the medication as well as problems around insurance coverage  He will look into his benefits to see if Wegovy would be something that would be covered          /80   Pulse 87   Temp 98.3 °F (36.8 °C) (Tympanic)   Wt 124.8 kg (275 lb 2.2 oz)   SpO2 97%   BMI 37.31 kg/m²     Review of Systems   Constitutional:  Negative for activity change and unexpected weight change.   HENT:  Negative for hearing loss, rhinorrhea and trouble swallowing.         Weird smells   Eyes:  Negative for discharge and visual disturbance.   Respiratory:  Negative for chest tightness and wheezing.    Cardiovascular:  Negative for chest pain and palpitations.   Gastrointestinal:  Negative for blood in stool, constipation, diarrhea and vomiting.   Endocrine: Negative for polydipsia and polyuria.   Genitourinary:  Negative for difficulty urinating, hematuria and urgency.   Musculoskeletal:  Negative for arthralgias, joint swelling and neck pain.   Neurological:  Negative for weakness and headaches.   Psychiatric/Behavioral:  Negative for confusion and dysphoric mood.        Objective:      Physical Exam  Vitals reviewed.   Constitutional:       General: He  is not in acute distress.     Appearance: Normal appearance. He is obese.   HENT:      Head: Normocephalic.      Right Ear: Tympanic membrane normal.      Left Ear: Tympanic membrane normal.      Nose: Nose normal.      Mouth/Throat:      Pharynx: Oropharynx is clear.   Eyes:      Conjunctiva/sclera: Conjunctivae normal.      Pupils: Pupils are equal, round, and reactive to light.   Cardiovascular:      Rate and Rhythm: Normal rate.      Pulses: Normal pulses.   Pulmonary:      Effort: Pulmonary effort is normal.   Abdominal:      General: Abdomen is flat.      Palpations: Abdomen is soft.   Musculoskeletal:         General: Normal range of motion.      Cervical back: Normal range of motion.   Skin:     General: Skin is warm.      Capillary Refill: Capillary refill takes less than 2 seconds.   Neurological:      Mental Status: He is alert and oriented to person, place, and time. Mental status is at baseline.   Psychiatric:         Mood and Affect: Mood normal.         Assessment:       1. Sense of smell altered    2. SHAW on CPAP    3. Primary hypertension    4. Class 2 severe obesity due to excess calories with serious comorbidity and body mass index (BMI) of 37.0 to 37.9 in adult        Plan:       Juan was seen today for weird smells.    Diagnoses and all orders for this visit:    Sense of smell altered  -     X-Ray Sinuses 3 or more views; Future  -     Ambulatory referral/consult to ENT; Future    SHAW on CPAP      - Chronic, doing well on therapy. Continue use  Primary hypertension       - Chronic, continue meds as prescribed   Class 2 severe obesity due to excess calories with serious comorbidity and body mass index (BMI) of 37.0 to 37.9 in adult      - We discussed some lifestyle and diet changes he can make. Will check into insurance coverage for [employer]. Food journals, slowly increasing exercise      Will refer to ENT for smell issues  Work on weight loss  6 month follow up with Dr. Naranjo and PANTERA

## 2023-10-11 ENCOUNTER — PATIENT MESSAGE (OUTPATIENT)
Dept: INTERNAL MEDICINE | Facility: CLINIC | Age: 53
End: 2023-10-11
Payer: OTHER GOVERNMENT

## 2023-10-13 DIAGNOSIS — E66.01 CLASS 2 SEVERE OBESITY DUE TO EXCESS CALORIES WITH SERIOUS COMORBIDITY AND BODY MASS INDEX (BMI) OF 36.0 TO 36.9 IN ADULT: Primary | ICD-10-CM

## 2023-10-13 RX ORDER — SEMAGLUTIDE 0.25 MG/.5ML
0.25 INJECTION, SOLUTION SUBCUTANEOUS
Qty: 4 EACH | Refills: 2 | Status: SHIPPED | OUTPATIENT
Start: 2023-10-13 | End: 2023-11-28 | Stop reason: SDUPTHER

## 2023-11-09 ENCOUNTER — PATIENT MESSAGE (OUTPATIENT)
Dept: PULMONOLOGY | Facility: CLINIC | Age: 53
End: 2023-11-09
Payer: OTHER GOVERNMENT

## 2023-11-14 ENCOUNTER — OFFICE VISIT (OUTPATIENT)
Dept: OTOLARYNGOLOGY | Facility: CLINIC | Age: 53
End: 2023-11-14
Payer: OTHER GOVERNMENT

## 2023-11-14 DIAGNOSIS — R43.9 SENSE OF SMELL ALTERED: ICD-10-CM

## 2023-11-14 PROCEDURE — 99999 PR PBB SHADOW E&M-EST. PATIENT-LVL III: CPT | Mod: PBBFAC,,, | Performed by: ORTHOPAEDIC SURGERY

## 2023-11-14 PROCEDURE — 99203 PR OFFICE/OUTPT VISIT, NEW, LEVL III, 30-44 MIN: ICD-10-PCS | Mod: S$PBB,,, | Performed by: ORTHOPAEDIC SURGERY

## 2023-11-14 PROCEDURE — 99213 OFFICE O/P EST LOW 20 MIN: CPT | Mod: PBBFAC | Performed by: ORTHOPAEDIC SURGERY

## 2023-11-14 PROCEDURE — 99999 PR PBB SHADOW E&M-EST. PATIENT-LVL III: ICD-10-PCS | Mod: PBBFAC,,, | Performed by: ORTHOPAEDIC SURGERY

## 2023-11-14 PROCEDURE — 99203 OFFICE O/P NEW LOW 30 MIN: CPT | Mod: S$PBB,,, | Performed by: ORTHOPAEDIC SURGERY

## 2023-11-14 NOTE — PATIENT INSTRUCTIONS
I discussed with the patient that anosmia is most often a post-viral sequelae, more common following a COVID infection.  We discussed in detail the relevant anatomy of the olfactory nerve, that is located superiorly in the nasal cavity.  Exam today was normal, and did not reveal any intranasal mass or abnormality as the cause for anosmia. I would recommend the use of a daily multivitamin as well as saline nasal spray.  I would also recommend olfactory retraining, a protocol can be found at www.abscent.org.   Olfactory dysfunction most often resolves, though it may persist for several months.  On average, 80 percent of patients report subjective recovery after one year.

## 2023-11-14 NOTE — PROGRESS NOTES
Subjective:      Patient ID: Juan Bateman is a 53 y.o. male.    Chief Complaint: Other (Complain of smelling foul odor. Onset 3 months ago. )    Patient is a 53 year old gentleman seen today for the first time for evaluation of a foul odor in his nose.  He says that this started about three months ago, he did have a URI at that time.  He was using an OTC nasal spray at that time, stopped after one month.  He says that it smells like burning onion/metallic scent.  He has tried using the Navage machine with no improvement.  He has not noted a decline in smell/taste otherwise.  Intensity of smell fluctuates over time but he feels that it has overall slightly improved.  He has had a normal sinus xray.  He is following with a periodontist, and no active disease, is due for implant and bone graft, all on the mandible, not maxilla.          Review of Systems   Constitutional: Negative.    HENT: Negative.     Eyes: Negative.    Cardiovascular: Negative.    Gastrointestinal: Negative.    Endocrine: Negative.    Genitourinary: Negative.    Skin: Negative.    Neurological: Negative.    Hematological: Negative.    Psychiatric/Behavioral: Negative.         Objective:       Physical Exam  Constitutional:       General: He is not in acute distress.     Appearance: He is well-developed.   HENT:      Head: Normocephalic and atraumatic.      Jaw: No trismus.      Right Ear: Hearing, tympanic membrane, ear canal and external ear normal.      Left Ear: Hearing, tympanic membrane, ear canal and external ear normal.      Nose: Nose normal. No nasal deformity, septal deviation, mucosal edema or rhinorrhea.      Mouth/Throat:      Dentition: Normal dentition.      Pharynx: Uvula midline. No oropharyngeal exudate or uvula swelling.   Eyes:      General: No scleral icterus.     Conjunctiva/sclera: Conjunctivae normal.      Right eye: Right conjunctiva is not injected. No chemosis.     Left eye: Left conjunctiva is not injected. No  chemosis.     Pupils: Pupils are equal, round, and reactive to light.   Neck:      Thyroid: No thyroid mass or thyromegaly.      Trachea: Trachea and phonation normal. No tracheal tenderness or tracheal deviation.   Pulmonary:      Effort: Pulmonary effort is normal. No accessory muscle usage or respiratory distress.      Breath sounds: No stridor.   Lymphadenopathy:      Head:      Right side of head: No submental, submandibular, preauricular or posterior auricular adenopathy.      Left side of head: No submental, submandibular, preauricular or posterior auricular adenopathy.      Cervical: No cervical adenopathy.      Right cervical: No superficial or deep cervical adenopathy.     Left cervical: No superficial or deep cervical adenopathy.   Skin:     General: Skin is warm and dry.      Findings: No erythema or rash.   Neurological:      Mental Status: He is alert and oriented to person, place, and time.      Cranial Nerves: No cranial nerve deficit.   Psychiatric:         Behavior: Behavior normal.         Thought Content: Thought content normal.         Assessment:       1. Sense of smell altered        Plan:     Sense of smell altered  -     Ambulatory referral/consult to ENT    I discussed with the patient that anosmia is most often a post-viral sequelae, more common following a COVID infection.  We discussed in detail the relevant anatomy of the olfactory nerve, that is located superiorly in the nasal cavity.  Exam today was normal, and did not reveal any intranasal mass or abnormality as the cause for anosmia. I would recommend the use of a daily multivitamin as well as saline nasal spray.  I would also recommend olfactory retraining, a protocol can be found at www.abscent.org.   Olfactory dysfunction most often resolves, though it may persist for several months.  On average, 80 percent of patients report subjective recovery after one year.

## 2023-11-27 ENCOUNTER — LAB VISIT (OUTPATIENT)
Dept: LAB | Facility: HOSPITAL | Age: 53
End: 2023-11-27
Attending: UROLOGY
Payer: OTHER GOVERNMENT

## 2023-11-27 DIAGNOSIS — E34.9 TESTOSTERONE DEFICIENCY: ICD-10-CM

## 2023-11-27 LAB
ALBUMIN SERPL BCP-MCNC: 4.4 G/DL (ref 3.5–5.2)
ALP SERPL-CCNC: 71 U/L (ref 55–135)
ALT SERPL W/O P-5'-P-CCNC: 23 U/L (ref 10–44)
ANION GAP SERPL CALC-SCNC: 8 MMOL/L (ref 8–16)
AST SERPL-CCNC: 20 U/L (ref 10–40)
BILIRUB SERPL-MCNC: 0.7 MG/DL (ref 0.1–1)
BUN SERPL-MCNC: 16 MG/DL (ref 6–20)
CALCIUM SERPL-MCNC: 10 MG/DL (ref 8.7–10.5)
CHLORIDE SERPL-SCNC: 103 MMOL/L (ref 95–110)
CO2 SERPL-SCNC: 29 MMOL/L (ref 23–29)
COMPLEXED PSA SERPL-MCNC: 0.42 NG/ML (ref 0–4)
CREAT SERPL-MCNC: 0.9 MG/DL (ref 0.5–1.4)
ERYTHROCYTE [DISTWIDTH] IN BLOOD BY AUTOMATED COUNT: 13.3 % (ref 11.5–14.5)
EST. GFR  (NO RACE VARIABLE): >60 ML/MIN/1.73 M^2
GLUCOSE SERPL-MCNC: 88 MG/DL (ref 70–110)
HCT VFR BLD AUTO: 46.1 % (ref 40–54)
HGB BLD-MCNC: 15.1 G/DL (ref 14–18)
MCH RBC QN AUTO: 29.8 PG (ref 27–31)
MCHC RBC AUTO-ENTMCNC: 32.8 G/DL (ref 32–36)
MCV RBC AUTO: 91 FL (ref 82–98)
PLATELET # BLD AUTO: 202 K/UL (ref 150–450)
PMV BLD AUTO: 11.6 FL (ref 9.2–12.9)
POTASSIUM SERPL-SCNC: 5 MMOL/L (ref 3.5–5.1)
PROT SERPL-MCNC: 7.2 G/DL (ref 6–8.4)
RBC # BLD AUTO: 5.07 M/UL (ref 4.6–6.2)
SODIUM SERPL-SCNC: 140 MMOL/L (ref 136–145)
TESTOST SERPL-MCNC: 1014 NG/DL (ref 304–1227)
WBC # BLD AUTO: 5.01 K/UL (ref 3.9–12.7)

## 2023-11-27 PROCEDURE — 84153 ASSAY OF PSA TOTAL: CPT | Performed by: UROLOGY

## 2023-11-27 PROCEDURE — 36415 COLL VENOUS BLD VENIPUNCTURE: CPT | Mod: PO | Performed by: UROLOGY

## 2023-11-27 PROCEDURE — 85027 COMPLETE CBC AUTOMATED: CPT | Performed by: UROLOGY

## 2023-11-27 PROCEDURE — 84403 ASSAY OF TOTAL TESTOSTERONE: CPT | Performed by: UROLOGY

## 2023-11-27 PROCEDURE — 80053 COMPREHEN METABOLIC PANEL: CPT | Performed by: UROLOGY

## 2023-12-05 ENCOUNTER — PATIENT MESSAGE (OUTPATIENT)
Dept: INTERNAL MEDICINE | Facility: CLINIC | Age: 53
End: 2023-12-05
Payer: OTHER GOVERNMENT

## 2023-12-05 RX ORDER — ALPRAZOLAM 0.25 MG/1
0.25 TABLET ORAL 2 TIMES DAILY PRN
Qty: 60 TABLET | Refills: 0 | Status: SHIPPED | OUTPATIENT
Start: 2023-12-05 | End: 2024-02-08

## 2023-12-05 NOTE — TELEPHONE ENCOUNTER
No care due was identified.  [facility] Embedded Care Due Messages. Reference number: [id number].   12/05/2023 8:50:32 AM CST

## 2023-12-28 ENCOUNTER — PATIENT MESSAGE (OUTPATIENT)
Dept: INTERNAL MEDICINE | Facility: CLINIC | Age: 53
End: 2023-12-28
Payer: OTHER GOVERNMENT

## 2023-12-31 NOTE — TELEPHONE ENCOUNTER
No care due was identified.  [facility] Embedded Care Due Messages. Reference number: [id number].   12/31/2023 11:50:36 AM CST

## 2024-01-02 RX ORDER — CITALOPRAM 20 MG/1
TABLET, FILM COATED ORAL
Qty: 90 TABLET | Refills: 1 | Status: SHIPPED | OUTPATIENT
Start: 2024-01-02

## 2024-01-02 NOTE — TELEPHONE ENCOUNTER
Refill Decision Note   Juan Bateman  is requesting a refill authorization.    Brief Assessment and Rationale for Refill:   Approve       Medication Therapy Plan:         Comments:     Note composed:11:44 AM 01/02/2024

## 2024-01-31 DIAGNOSIS — E66.01 CLASS 2 SEVERE OBESITY DUE TO EXCESS CALORIES WITH SERIOUS COMORBIDITY AND BODY MASS INDEX (BMI) OF 36.0 TO 36.9 IN ADULT: ICD-10-CM

## 2024-01-31 RX ORDER — SEMAGLUTIDE 0.25 MG/.5ML
INJECTION, SOLUTION SUBCUTANEOUS
Refills: 0 | OUTPATIENT
Start: 2024-01-31

## 2024-01-31 RX ORDER — SEMAGLUTIDE 0.25 MG/.5ML
0.25 INJECTION, SOLUTION SUBCUTANEOUS
Qty: 4 EACH | Refills: 11 | Status: SHIPPED | OUTPATIENT
Start: 2024-01-31 | End: 2024-04-08

## 2024-01-31 NOTE — TELEPHONE ENCOUNTER
Refill Routing Note   Medication(s) are not appropriate for processing by [facility] for the following reason(s):        Required labs outdated    [facility] action(s):  Defer      Medication Therapy Plan: Medication requested from [facility] refused due to error in request. Pended manually for provider review      Appointments  past 12m or future 3m with PCP    Date Provider   Last Visit   6/8/2023 Nigel Naranjo MD   Next Visit   6/13/2024 Nigel Naranjo MD   ED visits in past 90 days: 0        Note composed:12:11 PM 01/31/2024

## 2024-01-31 NOTE — TELEPHONE ENCOUNTER
No care due was identified.  [facility] Embedded Care Due Messages. Reference number: [id number].   1/31/2024 12:12:35 PM CST

## 2024-02-08 RX ORDER — ALPRAZOLAM 0.25 MG/1
TABLET ORAL
Qty: 60 TABLET | Refills: 0 | Status: SHIPPED | OUTPATIENT
Start: 2024-02-08 | End: 2024-05-17

## 2024-02-08 NOTE — TELEPHONE ENCOUNTER
Care Due:                  Date            Visit Type   Department     Provider  --------------------------------------------------------------------------------                                EP -                              PRIMARY      [facility] INTERNAL  Last Visit: 06-      CARE ([facility])   PANFILO Naranjo                              EP -                              PRIMARY      [facility] INTERNAL  Next Visit: 06-      CARE ([facility])   MEDICINE       Nigel Naranjo                                                            Last  Test          Frequency    Reason                     Performed    Due Date  --------------------------------------------------------------------------------    HBA1C.......  6 months...  semaglutide,.............  06- 12-    [facility] [employer] Embedded Care Due Messages. Reference number: [id number].   2/08/2024 7:46:52 AM CST

## 2024-04-03 ENCOUNTER — PATIENT MESSAGE (OUTPATIENT)
Dept: PULMONOLOGY | Facility: CLINIC | Age: 54
End: 2024-04-03
Payer: OTHER GOVERNMENT

## 2024-04-08 ENCOUNTER — E-VISIT (OUTPATIENT)
Dept: INTERNAL MEDICINE | Facility: CLINIC | Age: 54
End: 2024-04-08
Payer: OTHER GOVERNMENT

## 2024-04-08 DIAGNOSIS — E66.01 CLASS 2 SEVERE OBESITY DUE TO EXCESS CALORIES WITH SERIOUS COMORBIDITY AND BODY MASS INDEX (BMI) OF 36.0 TO 36.9 IN ADULT: Primary | ICD-10-CM

## 2024-04-08 PROCEDURE — 99421 OL DIG E/M SVC 5-10 MIN: CPT | Mod: S$PBB,,, | Performed by: NURSE PRACTITIONER

## 2024-04-08 RX ORDER — TIRZEPATIDE 2.5 MG/.5ML
2.5 INJECTION, SOLUTION SUBCUTANEOUS
Qty: 4 PEN | Refills: 1 | Status: SHIPPED | OUTPATIENT
Start: 2024-04-08 | End: 2024-06-13

## 2024-04-08 NOTE — PROGRESS NOTES
Subjective:       Patient ID: Juan Bateman is a 53 y.o. male.    Chief Complaint: Medication Management (Entered automatically based on patient selection in Patient Portal.)    E-visit done for medication adjustment  Pt previously on wegovy for obesity and weight management but unable to get due to supply issues  Pt request a trial of zepbound  There is no history of multiple  endocrine neoplasia and there is no history of thyroid cancer in the patient.    We discussed the effects and side effects of the medication during a previous appt       There were no vitals taken for this visit.    Review of Systems    Objective:      Physical Exam    Assessment:       1. Class 2 severe obesity due to excess calories with serious comorbidity and body mass index (BMI) of 36.0 to 36.9 in adult        Plan:       Juan was seen today for medication management.    Diagnoses and all orders for this visit:    Class 2 severe obesity due to excess calories with serious comorbidity and body mass index (BMI) of 36.0 to 36.9 in adult  -     tirzepatide, weight loss, (ZEPBOUND) 2.5 mg/0.5 mL PnIj; Inject 2.5 mg into the skin every 7 days.      There are no Patient Instructions on file for this visit.

## 2024-05-17 RX ORDER — ALPRAZOLAM 0.25 MG/1
TABLET ORAL
Qty: 60 TABLET | Refills: 0 | Status: SHIPPED | OUTPATIENT
Start: 2024-05-17

## 2024-05-17 NOTE — TELEPHONE ENCOUNTER
Care Due:                  Date            Visit Type   Department     Provider  --------------------------------------------------------------------------------                                EP -                              PRIMARY      [facility] INTERNAL  Last Visit: 06-      CARE ([facility])   MEDICINE       Nigel Naranjo                              EP -                              PRIMARY      [facility] INTERNAL  Next Visit: 06-      CARE ([facility])   MEDICINE       Nigel Naranjo                                                            Last  Test          Frequency    Reason                     Performed    Due Date  --------------------------------------------------------------------------------    Lipid Panel.  12 months..  atorvastatin.............  06- 06-    [facility] Embedded Care Due Messages. Reference number: [id number].   5/17/2024 10:42:21 AM CDT

## 2024-05-17 NOTE — TELEPHONE ENCOUNTER
Refill Routing Note   Medication(s) are not appropriate for processing by [facility] for the following reason(s):        Outside of protocol    [facility] action(s):  Route             Appointments  past 12m or future 3m with PCP    Date Provider   Last Visit   6/8/2023 Nigel Naranjo MD   Next Visit   6/13/2024 Nigel Naranjo MD   ED visits in past 90 days: 0        Note composed:11:56 AM 05/17/2024

## 2024-05-22 RX ORDER — ATORVASTATIN CALCIUM 20 MG/1
TABLET, FILM COATED ORAL
Refills: 0 | OUTPATIENT
Start: 2024-05-22

## 2024-05-22 NOTE — TELEPHONE ENCOUNTER
No care due was identified.  [facility] Embedded Care Due Messages. Reference number: [id number].   5/22/2024 11:15:21 AM CDT

## 2024-05-22 NOTE — TELEPHONE ENCOUNTER
Refill Decision Note   Juan Bateman  is requesting a refill authorization.  Brief Assessment and Rationale for Refill:  Quick Discontinue     Medication Therapy Plan:    Pharmacy is requesting new scripts for the following medications without required information, (sig/ frequency/qty/etc)      Medication Reconciliation Completed: No     Comments: Pharmacies have been requesting medications for patients without required information, (sig, frequency, qty, etc.). In addition, requests are sent for medication(s) pt. are currently not taking, and medications patients have never taken.    We have spoken to the pharmacies about these request types and advised their teams previously that we are unable to assess these New Script requests and require all details for these requests. This is a known issue and has been reported.     Note composed:11:24 AM 05/22/2024

## 2024-06-06 ENCOUNTER — PATIENT MESSAGE (OUTPATIENT)
Dept: INTERNAL MEDICINE | Facility: CLINIC | Age: 54
End: 2024-06-06
Payer: OTHER GOVERNMENT

## 2024-06-06 DIAGNOSIS — Z00.00 ROUTINE GENERAL MEDICAL EXAMINATION AT HEALTH CARE FACILITY: Primary | ICD-10-CM

## 2024-06-07 RX ORDER — CITALOPRAM 20 MG/1
TABLET, FILM COATED ORAL
Qty: 90 TABLET | Refills: 0 | Status: SHIPPED | OUTPATIENT
Start: 2024-06-07 | End: 2024-06-13

## 2024-06-07 NOTE — TELEPHONE ENCOUNTER
Refill Decision Note   Juan Bateman  is requesting a refill authorization.  Brief Assessment and Rationale for Refill:  Approve     Medication Therapy Plan:         Comments:     Note composed:6:08 AM 06/07/2024

## 2024-06-07 NOTE — TELEPHONE ENCOUNTER
No care due was identified.  [facility] Embedded Care Due Messages. Reference number: [id number].   6/07/2024 2:31:27 AM CDT

## 2024-06-10 ENCOUNTER — LAB VISIT (OUTPATIENT)
Dept: LAB | Facility: HOSPITAL | Age: 54
End: 2024-06-10
Attending: INTERNAL MEDICINE
Payer: OTHER GOVERNMENT

## 2024-06-10 DIAGNOSIS — Z00.00 ROUTINE GENERAL MEDICAL EXAMINATION AT HEALTH CARE FACILITY: ICD-10-CM

## 2024-06-10 LAB
CHOLEST SERPL-MCNC: 120 MG/DL (ref 120–199)
CHOLEST/HDLC SERPL: 3.2 {RATIO} (ref 2–5)
ESTIMATED AVG GLUCOSE: 103 MG/DL (ref 68–131)
HBA1C MFR BLD: 5.2 % (ref 4–5.6)
HDLC SERPL-MCNC: 37 MG/DL (ref 40–75)
HDLC SERPL: 30.8 % (ref 20–50)
LDLC SERPL CALC-MCNC: 68 MG/DL (ref 63–159)
NONHDLC SERPL-MCNC: 83 MG/DL
TRIGL SERPL-MCNC: 75 MG/DL (ref 30–150)

## 2024-06-10 PROCEDURE — 36415 COLL VENOUS BLD VENIPUNCTURE: CPT | Mod: PO | Performed by: INTERNAL MEDICINE

## 2024-06-10 PROCEDURE — 83036 HEMOGLOBIN GLYCOSYLATED A1C: CPT | Performed by: INTERNAL MEDICINE

## 2024-06-10 PROCEDURE — 80061 LIPID PANEL: CPT | Performed by: INTERNAL MEDICINE

## 2024-06-11 NOTE — PROGRESS NOTES
Here are the results of your recent labs.  We will review them in detail at your follow up visit.    Sincerely,    Nigel Naranjo M.D.        If you would like to review your experience with Dr. Naranjo or [facility], please follow the link below:    [email or web address]

## 2024-06-13 ENCOUNTER — OFFICE VISIT (OUTPATIENT)
Dept: INTERNAL MEDICINE | Facility: CLINIC | Age: 54
End: 2024-06-13
Payer: OTHER GOVERNMENT

## 2024-06-13 VITALS
SYSTOLIC BLOOD PRESSURE: 124 MMHG | HEART RATE: 77 BPM | OXYGEN SATURATION: 96 % | DIASTOLIC BLOOD PRESSURE: 80 MMHG | BODY MASS INDEX: 33.55 KG/M2 | WEIGHT: 247.38 LBS | TEMPERATURE: 97 F

## 2024-06-13 DIAGNOSIS — I10 PRIMARY HYPERTENSION: ICD-10-CM

## 2024-06-13 DIAGNOSIS — E78.2 MIXED HYPERLIPIDEMIA: ICD-10-CM

## 2024-06-13 DIAGNOSIS — F41.9 ANXIETY: ICD-10-CM

## 2024-06-13 DIAGNOSIS — G47.33 OSA ON CPAP: ICD-10-CM

## 2024-06-13 DIAGNOSIS — F51.04 PSYCHOPHYSIOLOGICAL INSOMNIA: ICD-10-CM

## 2024-06-13 DIAGNOSIS — Z00.00 ROUTINE GENERAL MEDICAL EXAMINATION AT HEALTH CARE FACILITY: Primary | ICD-10-CM

## 2024-06-13 PROBLEM — D64.9 NORMOCYTIC ANEMIA: Status: RESOLVED | Noted: 2022-05-12 | Resolved: 2024-06-13

## 2024-06-13 PROCEDURE — 99396 PREV VISIT EST AGE 40-64: CPT | Mod: S$PBB,,, | Performed by: INTERNAL MEDICINE

## 2024-06-13 PROCEDURE — 99214 OFFICE O/P EST MOD 30 MIN: CPT | Mod: PBBFAC,PO | Performed by: INTERNAL MEDICINE

## 2024-06-13 PROCEDURE — 99999 PR PBB SHADOW E&M-EST. PATIENT-LVL IV: CPT | Mod: PBBFAC,,, | Performed by: INTERNAL MEDICINE

## 2024-06-13 RX ORDER — MINOXIDIL 2.5 MG/1
0.25 TABLET ORAL DAILY
COMMUNITY
Start: 2024-04-17

## 2024-06-13 RX ORDER — CITALOPRAM 40 MG/1
40 TABLET, FILM COATED ORAL DAILY
Qty: 90 TABLET | Refills: 3 | Status: SHIPPED | OUTPATIENT
Start: 2024-06-13

## 2024-06-13 RX ORDER — ESZOPICLONE 2 MG/1
2 TABLET, FILM COATED ORAL NIGHTLY
Qty: 30 TABLET | Refills: 0 | Status: SHIPPED | OUTPATIENT
Start: 2024-06-13 | End: 2024-07-13

## 2024-06-13 NOTE — PROGRESS NOTES
Subjective:       Patient ID: Juan Bateman is a 53 y.o. male.    Chief Complaint: follow up       HPI:  Patient is a 53-year-old male presenting today for updated physical exam review of chronic health issues.  Patient has history of hypertension, hyperlipidemia, sleep apnea he has been anxiety and he has been having some trouble with insomnia.  Patient indicates overall he has been doing well he is taking his medications as prescribed without adverse effects.  His chronic conditions seem to be stable.  In regards to his sleep apnea he has not been wearing his CPAP.  He states after COVID he had a issue with some anosmia and weird smells and it was really hard for him to wear his CPAP.  Subsequently he has not been using it of late.  We talked about the importance of treating it and making sure that his sleep apnea is addressed.  We decided to go ahead and try to get back onto the CPAP at this time.    He has also been leading some problems with some insomnia.  He states that he does not really struggled to bed to fall asleep but he is waking up around 3:00 a.m. in the morning and when he does so he has trouble getting back to sleep.  He states he is in his mind turns on he starts thinking about things and he just can not shut down.  He is tried over-the-counter sleep aids without any success.  Patient is under treatment for anxiety with Celexa 20 mg daily.  We talked about early morning awakenings as sign of the anxiety.    Review of Systems   Constitutional:  Negative for fever and unexpected weight change.   HENT:  Negative for hearing loss, postnasal drip and rhinorrhea.    Eyes:  Negative for pain and visual disturbance.   Respiratory:  Negative for cough, shortness of breath and wheezing.    Cardiovascular:  Negative for chest pain and palpitations.   Gastrointestinal:  Negative for constipation, diarrhea, nausea and vomiting.   Genitourinary:  Negative for dysuria and hematuria.   Musculoskeletal:   Negative for arthralgias, back pain, myalgias and neck stiffness.   Skin:  Negative for pallor and rash.   Neurological:  Negative for seizures, syncope and headaches.   Hematological:  Negative for adenopathy.   Psychiatric/Behavioral:  Negative for dysphoric mood. The patient is not nervous/anxious.        Objective:   /80   Pulse 77   Temp 97.1 °F (36.2 °C) (Tympanic)   Wt 112.2 kg (247 lb 5.7 oz)   SpO2 96%   BMI 33.55 kg/m²      Physical Exam  Vitals reviewed.   Constitutional:       General: He is not in acute distress.     Appearance: He is well-developed.   HENT:      Head: Normocephalic and atraumatic.      Right Ear: Tympanic membrane and ear canal normal.      Left Ear: Tympanic membrane and ear canal normal.   Eyes:      Pupils: Pupils are equal, round, and reactive to light.   Neck:      Thyroid: No thyromegaly.      Vascular: No JVD.   Cardiovascular:      Rate and Rhythm: Normal rate and regular rhythm.      Heart sounds: Normal heart sounds. No murmur heard.     No friction rub. No gallop.   Pulmonary:      Effort: Pulmonary effort is normal.      Breath sounds: Normal breath sounds. No wheezing or rales.   Abdominal:      General: Bowel sounds are normal. There is no distension.      Palpations: Abdomen is soft.      Tenderness: There is no abdominal tenderness. There is no guarding or rebound.   Musculoskeletal:         General: Normal range of motion.      Cervical back: Normal range of motion and neck supple.   Lymphadenopathy:      Cervical: No cervical adenopathy.   Skin:     General: Skin is warm and dry.      Findings: No rash.   Neurological:      General: No focal deficit present.      Mental Status: He is alert and oriented to person, place, and time.      Cranial Nerves: No cranial nerve deficit.      Deep Tendon Reflexes: Reflexes are normal and symmetric.   Psychiatric:         Mood and Affect: Mood normal.         Judgment: Judgment normal.         Lab Visit on 06/10/2024    Component Date Value    Hemoglobin A1C 06/10/2024 5.2     Estimated Avg Glucose 06/10/2024 103     Cholesterol 06/10/2024 120     Triglycerides 06/10/2024 75     HDL 06/10/2024 37 (L)     LDL Cholesterol 06/10/2024 68.0     HDL/Cholesterol Ratio 06/10/2024 30.8     Total Cholesterol/HDL Ra* 06/10/2024 3.2     Non-HDL Cholesterol 06/10/2024 83        Assessment:       1. Routine general medical examination at health care facility    2. Primary hypertension    3. Mixed hyperlipidemia    4. SHAW on CPAP    5. Psychophysiological insomnia    6. Anxiety        Plan:   HTN (hypertension)  Blood pressure is under good control.  We will continue the current regimen.  Will work on regular aerobic exercise and a low salt diet.        Hyperlipidemia  Cholesterol numbers look good.  We will continue the current regimen at this time.  Remain focused on low fat diet and high dietary fiber intake.    Juan was seen today for follow up .    Diagnoses and all orders for this visit:    Routine general medical examination at health care facility  Comments:  Focus on good health habits, low carb diet, regular exercise, seatbelt use, sunscreen use    Primary hypertension    Mixed hyperlipidemia    SHAW on CPAP  Comments:  resume cpap. Has been off for a little while.    Psychophysiological insomnia  Comments:  lunesta as needed  Orders:  -     eszopiclone (LUNESTA) 2 MG Tab; Take 1 tablet (2 mg total) by mouth every evening.    Anxiety  -     citalopram (CELEXA) 40 MG tablet; Take 1 tablet (40 mg total) by mouth once daily.      We will go ahead and increase his strength on his Celexa to see if we can get a little better control of the anxiety aspect of things and give him some Lunesta to use 2-3 nights a week for for sleep.  Cautioned him not to use the alprazolam at the same time he expressed understanding.  Also indicated that maybe some benefits to some cognitive behavioral therapy to help him with his over thinking symptoms.   He is going to read up on that.      Regards to the CPAP I strongly recommended he resume his CPAP.  He expressed understanding.  He is going to give it a try and see how it does.  If he has not able to tolerate it still we will get him back him with sleep to discuss other options.    Follow up in about 2 months (around 8/13/2024) for Virtual Visit.

## 2024-07-06 NOTE — TELEPHONE ENCOUNTER
No care due was identified.  [facility] Embedded Care Due Messages. Reference number: [id number].   7/06/2024 11:13:55 AM CDT

## 2024-07-08 ENCOUNTER — PATIENT MESSAGE (OUTPATIENT)
Dept: INTERNAL MEDICINE | Facility: CLINIC | Age: 54
End: 2024-07-08
Payer: OTHER GOVERNMENT

## 2024-07-08 DIAGNOSIS — G89.29 CHRONIC LEFT-SIDED LOW BACK PAIN WITHOUT SCIATICA: ICD-10-CM

## 2024-07-08 DIAGNOSIS — M54.50 CHRONIC LEFT-SIDED LOW BACK PAIN WITHOUT SCIATICA: ICD-10-CM

## 2024-07-08 RX ORDER — ATORVASTATIN CALCIUM 20 MG/1
20 TABLET, FILM COATED ORAL DAILY
Qty: 90 TABLET | Refills: 1 | Status: SHIPPED | OUTPATIENT
Start: 2024-07-08

## 2024-07-08 RX ORDER — CELECOXIB 200 MG/1
CAPSULE ORAL
Qty: 90 CAPSULE | Refills: 3 | Status: SHIPPED | OUTPATIENT
Start: 2024-07-08

## 2024-07-08 NOTE — TELEPHONE ENCOUNTER
Refill Decision Note   Juan Bateman  is requesting a refill authorization.  Brief Assessment and Rationale for Refill:  Approve     Medication Therapy Plan:         Comments:     Note composed:9:05 AM 07/08/2024

## 2024-08-22 RX ORDER — AMLODIPINE BESYLATE 5 MG/1
TABLET ORAL
Qty: 90 TABLET | Refills: 3 | Status: SHIPPED | OUTPATIENT
Start: 2024-08-22

## 2024-08-22 RX ORDER — METOPROLOL SUCCINATE 50 MG/1
TABLET, EXTENDED RELEASE ORAL
Qty: 90 TABLET | Refills: 3 | Status: SHIPPED | OUTPATIENT
Start: 2024-08-22

## 2024-08-22 NOTE — TELEPHONE ENCOUNTER
Refill Decision Note   Juan Bateman  is requesting a refill authorization.  Brief Assessment and Rationale for Refill:  Approve     Medication Therapy Plan:        Comments:     Note composed:11:51 AM 08/22/2024

## 2024-08-22 NOTE — TELEPHONE ENCOUNTER
No care due was identified.  [facility] Embedded Care Due Messages. Reference number: [id number].   8/22/2024 2:29:50 AM CDT

## 2024-09-05 RX ORDER — CITALOPRAM 20 MG/1
TABLET, FILM COATED ORAL
Qty: 90 TABLET | Refills: 3 | OUTPATIENT
Start: 2024-09-05

## 2024-09-05 NOTE — TELEPHONE ENCOUNTER
Care Due:                  Date            Visit Type   Department     Provider  --------------------------------------------------------------------------------                                EP -                              PRIMARY      [facility] INTERNAL  Last Visit: 06-      CARE ([facility])   MEDICINE       Nigel Naranjo  Next Visit: None Scheduled  None         None Found                                                            Last  Test          Frequency    Reason                     Performed    Due Date  --------------------------------------------------------------------------------    CMP.........  12 months..  atorvastatin.............  11- 11-    [facility] Embedded Care Due Messages. Reference number: [id number].   9/05/2024 2:31:17 AM CDT

## 2024-09-17 RX ORDER — ALPRAZOLAM 0.25 MG/1
TABLET ORAL
Qty: 60 TABLET | Refills: 0 | Status: SHIPPED | OUTPATIENT
Start: 2024-09-17

## 2024-09-17 NOTE — TELEPHONE ENCOUNTER
No care due was identified.  [facility] Embedded Care Due Messages. Reference number: [id number].   9/16/2024 8:25:02 PM CDT

## 2024-11-30 DIAGNOSIS — F51.04 PSYCHOPHYSIOLOGICAL INSOMNIA: Primary | ICD-10-CM

## 2024-11-30 NOTE — TELEPHONE ENCOUNTER
Care Due:                  Date            Visit Type   Department     Provider  --------------------------------------------------------------------------------                                EP -                              PRIMARY      [facility] INTERNAL  Last Visit: 06-      CARE ([facility])   MEDICINE       Nigel Naranjo  Next Visit: None Scheduled  None         None Found                                                            Last  Test          Frequency    Reason                     Performed    Due Date  --------------------------------------------------------------------------------    CMP.........  12 months..  atorvastatin.............  11- 11-    [facility] Embedded Care Due Messages. Reference number: [id number].   11/30/2024 7:42:26 AM CST

## 2024-12-02 RX ORDER — ALPRAZOLAM 0.25 MG/1
TABLET ORAL
Qty: 60 TABLET | Refills: 0 | Status: SHIPPED | OUTPATIENT
Start: 2024-12-02

## 2024-12-04 DIAGNOSIS — I10 HTN (HYPERTENSION): ICD-10-CM

## 2024-12-13 RX ORDER — ATORVASTATIN CALCIUM 20 MG/1
20 TABLET, FILM COATED ORAL
Qty: 90 TABLET | Refills: 0 | Status: SHIPPED | OUTPATIENT
Start: 2024-12-13

## 2024-12-13 NOTE — TELEPHONE ENCOUNTER
Refill Routing Note   Medication(s) are not appropriate for processing by [facility] for the following reason(s):        Required labs outdated    [facility] action(s):  Defer               Appointments  past 12m or future 3m with PCP    Date Provider   Last Visit   6/13/2024 Nigel Naranjo MD   Next Visit   Visit date not found Nigel Naranjo MD   ED visits in past 90 days: 0        Note composed:4:38 AM 12/13/2024

## 2024-12-13 NOTE — TELEPHONE ENCOUNTER
No care due was identified.  [facility] Embedded Care Due Messages. Reference number: [id number].   12/13/2024 1:09:28 AM CST

## 2024-12-15 DIAGNOSIS — F41.9 ANXIETY: ICD-10-CM

## 2024-12-15 NOTE — TELEPHONE ENCOUNTER
No care due was identified.  [facility] Embedded Care Due Messages. Reference number: [id number].   12/15/2024 2:38:27 PM CST

## 2024-12-16 RX ORDER — CITALOPRAM 40 MG/1
TABLET, FILM COATED ORAL
Refills: 0 | OUTPATIENT
Start: 2024-12-16

## 2024-12-16 NOTE — TELEPHONE ENCOUNTER
Refill Decision Note   Juan Bateman  is requesting a refill authorization.  Brief Assessment and Rationale for Refill:  Quick Discontinue     Medication Therapy Plan:    Pharmacy is requesting new scripts for the following medications without required information, (sig/ frequency/qty/etc)      Medication Reconciliation Completed: No     Comments: Pharmacies have been requesting medications for patients without required information, (sig, frequency, qty, etc.). In addition, requests are sent for medication(s) pt. are currently not taking, and medications patients have never taken.    We have spoken to the pharmacies about these request types and advised their teams previously that we are unable to assess these New Script requests and require all details for these requests. This is a known issue and has been reported.     Note composed:6:08 AM 12/16/2024

## 2025-02-08 DIAGNOSIS — F51.04 PSYCHOPHYSIOLOGICAL INSOMNIA: ICD-10-CM

## 2025-02-08 NOTE — TELEPHONE ENCOUNTER
Care Due:                  Date            Visit Type   Department     Provider  --------------------------------------------------------------------------------                                EP -                              PRIMARY      [facility] INTERNAL  Last Visit: 06-      CARE ([facility])   MEDICINE       Nigel Naranjo  Next Visit: None Scheduled  None         None Found                                                            Last  Test          Frequency    Reason                     Performed    Due Date  --------------------------------------------------------------------------------    CMP.........  12 months..  atorvastatin.............  11- 11-    [facility] Embedded Care Due Messages. Reference number: [id number].   2/08/2025 4:45:15 PM CST

## 2025-02-11 RX ORDER — ALPRAZOLAM 0.25 MG/1
TABLET ORAL
Qty: 60 TABLET | Refills: 0 | Status: SHIPPED | OUTPATIENT
Start: 2025-02-11

## 2025-03-13 RX ORDER — ATORVASTATIN CALCIUM 20 MG/1
20 TABLET, FILM COATED ORAL
Qty: 90 TABLET | Refills: 0 | Status: SHIPPED | OUTPATIENT
Start: 2025-03-13

## 2025-03-13 NOTE — TELEPHONE ENCOUNTER
Refill Routing Note   Medication(s) are not appropriate for processing by [facility] for the following reason(s):        Required labs outdated    [facility] action(s):  Defer   Requires labs : Yes             Appointments  past 12m or future 3m with PCP    Date Provider   Last Visit   6/13/2024 Nigel Naranjo MD   Next Visit   Visit date not found Nigel Naranjo MD   ED visits in past 90 days: 0        Note composed:10:05 AM 03/13/2025

## 2025-03-13 NOTE — TELEPHONE ENCOUNTER
Care Due:                  Date            Visit Type   Department     Provider  --------------------------------------------------------------------------------                                EP -                              PRIMARY      [facility] INTERNAL  Last Visit: 06-      CARE ([facility])   MEDICINE       Nigel Naranjo  Next Visit: None Scheduled  None         None Found                                                            Last  Test          Frequency    Reason                     Performed    Due Date  --------------------------------------------------------------------------------    Lipid Panel.  12 months..  atorvastatin.............  06-   06-    [facility] Embedded Care Due Messages. Reference number: [id number].   3/13/2025 2:16:31 AM CDT

## 2025-04-10 DIAGNOSIS — F51.04 PSYCHOPHYSIOLOGICAL INSOMNIA: ICD-10-CM

## 2025-04-10 RX ORDER — ALPRAZOLAM 0.25 MG/1
TABLET ORAL
Qty: 60 TABLET | Refills: 0 | Status: SHIPPED | OUTPATIENT
Start: 2025-04-10

## 2025-04-10 NOTE — TELEPHONE ENCOUNTER
Care Due:                  Date            Visit Type   Department     Provider  --------------------------------------------------------------------------------                                EP -                              PRIMARY      [facility] INTERNAL  Last Visit: 06-      CARE ([facility])   MEDICINE       Nigel Naranjo  Next Visit: None Scheduled  None         None Found                                                            Last  Test          Frequency    Reason                     Performed    Due Date  --------------------------------------------------------------------------------    CMP.........  12 months..  atorvastatin.............  11- 11-    [facility] Embedded Care Due Messages. Reference number: [id number].   4/10/2025 12:15:59 PM CDT

## 2025-06-06 ENCOUNTER — OFFICE VISIT (OUTPATIENT)
Dept: INTERNAL MEDICINE | Facility: CLINIC | Age: 55
End: 2025-06-06
Payer: OTHER GOVERNMENT

## 2025-06-06 DIAGNOSIS — F41.9 ANXIETY: Primary | ICD-10-CM

## 2025-06-06 DIAGNOSIS — Z12.5 SCREENING FOR MALIGNANT NEOPLASM OF PROSTATE: ICD-10-CM

## 2025-06-06 DIAGNOSIS — E78.2 MIXED HYPERLIPIDEMIA: ICD-10-CM

## 2025-06-06 DIAGNOSIS — F51.04 PSYCHOPHYSIOLOGICAL INSOMNIA: ICD-10-CM

## 2025-06-06 DIAGNOSIS — I10 PRIMARY HYPERTENSION: ICD-10-CM

## 2025-06-06 RX ORDER — CITALOPRAM 40 MG/1
40 TABLET ORAL DAILY
Qty: 90 TABLET | Refills: 3 | Status: SHIPPED | OUTPATIENT
Start: 2025-06-06

## 2025-06-11 RX ORDER — ATORVASTATIN CALCIUM 20 MG/1
20 TABLET, FILM COATED ORAL DAILY
Qty: 90 TABLET | Refills: 0 | Status: SHIPPED | OUTPATIENT
Start: 2025-06-11

## 2025-06-11 NOTE — TELEPHONE ENCOUNTER
Care Due:                  Date            Visit Type   Department     Provider  --------------------------------------------------------------------------------                                EP -                              PRIMARY      [facility] INTERNAL  Last Visit: 06-      CARE ([facility])   MEDICINE       Nigel Naranjo  Next Visit: None Scheduled  None         None Found                                                            Last  Test          Frequency    Reason                     Performed    Due Date  --------------------------------------------------------------------------------    Office Visit  15 months..  atorvastatin, omeprazole.  06- 09-    CMP.........  12 months..  atorvastatin.............  11- 11-    [facility] Embedded Care Due Messages. Reference number: [id number].   6/11/2025 2:46:47 AM CDT

## 2025-06-11 NOTE — TELEPHONE ENCOUNTER
Refill Routing Note   Medication(s) are not appropriate for processing by [facility] for the following reason(s):        Required labs outdated    [facility] action(s):  Defer   Requires appointment : Yes     Requires labs : Yes             Appointments  past 12m or future 3m with PCP    Date Provider   Last Visit   6/13/2024 Nigel Naranjo MD   Next Visit   Visit date not found Ngiel Naranjo MD   ED visits in past 90 days: 0        Note composed:11:01 AM 06/11/2025

## 2025-06-11 NOTE — TELEPHONE ENCOUNTER
Patient overdue for follow up.  Refill is given but the patient needs an appointment with Dr. Naranjo or Nishant Hughes NP, for follow up.

## 2025-06-20 DIAGNOSIS — F51.04 PSYCHOPHYSIOLOGICAL INSOMNIA: ICD-10-CM

## 2025-06-20 NOTE — TELEPHONE ENCOUNTER
No care due was identified.  [facility] Embedded Care Due Messages. Reference number: [id number].   6/20/2025 6:56:32 PM CDT

## 2025-06-23 RX ORDER — ALPRAZOLAM 0.25 MG/1
TABLET ORAL
Qty: 60 TABLET | Refills: 0 | Status: SHIPPED | OUTPATIENT
Start: 2025-06-23

## 2025-07-03 DIAGNOSIS — M54.50 CHRONIC LEFT-SIDED LOW BACK PAIN WITHOUT SCIATICA: ICD-10-CM

## 2025-07-03 DIAGNOSIS — G89.29 CHRONIC LEFT-SIDED LOW BACK PAIN WITHOUT SCIATICA: ICD-10-CM

## 2025-07-03 RX ORDER — CELECOXIB 200 MG/1
200 CAPSULE ORAL
Qty: 90 CAPSULE | Refills: 3 | Status: SHIPPED | OUTPATIENT
Start: 2025-07-03

## 2025-08-17 DIAGNOSIS — F51.04 PSYCHOPHYSIOLOGICAL INSOMNIA: ICD-10-CM

## 2025-08-18 RX ORDER — ALPRAZOLAM 0.25 MG/1
TABLET ORAL
Qty: 60 TABLET | Refills: 0 | Status: SHIPPED | OUTPATIENT
Start: 2025-08-18

## 2025-08-18 RX ORDER — AMLODIPINE BESYLATE 5 MG/1
5 TABLET ORAL
Qty: 90 TABLET | Refills: 0 | Status: SHIPPED | OUTPATIENT
Start: 2025-08-18

## 2025-08-18 RX ORDER — METOPROLOL SUCCINATE 50 MG/1
50 TABLET, EXTENDED RELEASE ORAL
Qty: 90 TABLET | Refills: 0 | Status: SHIPPED | OUTPATIENT
Start: 2025-08-18

## 2025-08-28 ENCOUNTER — PATIENT MESSAGE (OUTPATIENT)
Dept: INTERNAL MEDICINE | Facility: CLINIC | Age: 55
End: 2025-08-28
Payer: OTHER GOVERNMENT

## 2025-08-28 DIAGNOSIS — Z00.00 ENCOUNTER FOR PREVENTIVE CARE: Primary | ICD-10-CM

## 2025-08-29 ENCOUNTER — LAB VISIT (OUTPATIENT)
Dept: LAB | Facility: HOSPITAL | Age: 55
End: 2025-08-29
Attending: NURSE PRACTITIONER
Payer: OTHER GOVERNMENT

## 2025-08-29 DIAGNOSIS — I10 PRIMARY HYPERTENSION: ICD-10-CM

## 2025-08-29 DIAGNOSIS — Z00.00 ENCOUNTER FOR PREVENTIVE CARE: ICD-10-CM

## 2025-08-29 DIAGNOSIS — Z12.5 SCREENING FOR MALIGNANT NEOPLASM OF PROSTATE: ICD-10-CM

## 2025-08-29 LAB
ABSOLUTE EOSINOPHIL (OHS): 0.06 K/UL
ABSOLUTE MONOCYTE (OHS): 0.38 K/UL (ref 0.3–1)
ABSOLUTE NEUTROPHIL COUNT (OHS): 1.98 K/UL (ref 1.8–7.7)
ALBUMIN SERPL BCP-MCNC: 4.5 G/DL (ref 3.5–5.2)
ALP SERPL-CCNC: 54 UNIT/L (ref 40–150)
ALT SERPL W/O P-5'-P-CCNC: 28 UNIT/L (ref 0–55)
ANION GAP (OHS): 9 MMOL/L (ref 8–16)
AST SERPL-CCNC: 29 UNIT/L (ref 0–50)
BASOPHILS # BLD AUTO: 0.03 K/UL
BASOPHILS NFR BLD AUTO: 0.8 %
BILIRUB SERPL-MCNC: 0.7 MG/DL (ref 0.1–1)
BUN SERPL-MCNC: 18 MG/DL (ref 6–20)
CALCIUM SERPL-MCNC: 9.5 MG/DL (ref 8.7–10.5)
CHLORIDE SERPL-SCNC: 102 MMOL/L (ref 95–110)
CHOLEST SERPL-MCNC: 173 MG/DL (ref 120–199)
CHOLEST/HDLC SERPL: 4.1 {RATIO} (ref 2–5)
CO2 SERPL-SCNC: 29 MMOL/L (ref 23–29)
CREAT SERPL-MCNC: 0.9 MG/DL (ref 0.5–1.4)
EAG (OHS): 108 MG/DL (ref 68–131)
ERYTHROCYTE [DISTWIDTH] IN BLOOD BY AUTOMATED COUNT: 13 % (ref 11.5–14.5)
GFR SERPLBLD CREATININE-BSD FMLA CKD-EPI: >60 ML/MIN/1.73/M2
GLUCOSE SERPL-MCNC: 91 MG/DL (ref 70–110)
HBA1C MFR BLD: 5.4 % (ref 4–5.6)
HCT VFR BLD AUTO: 41.3 % (ref 40–54)
HDLC SERPL-MCNC: 42 MG/DL (ref 40–75)
HDLC SERPL: 24.3 % (ref 20–50)
HGB BLD-MCNC: 13.3 GM/DL (ref 14–18)
IMM GRANULOCYTES # BLD AUTO: 0.02 K/UL (ref 0–0.04)
IMM GRANULOCYTES NFR BLD AUTO: 0.5 % (ref 0–0.5)
LDLC SERPL CALC-MCNC: 101.6 MG/DL (ref 63–159)
LYMPHOCYTES # BLD AUTO: 1.3 K/UL (ref 1–4.8)
MCH RBC QN AUTO: 29.2 PG (ref 27–31)
MCHC RBC AUTO-ENTMCNC: 32.2 G/DL (ref 32–36)
MCV RBC AUTO: 91 FL (ref 82–98)
NONHDLC SERPL-MCNC: 131 MG/DL
NUCLEATED RBC (/100WBC) (OHS): 0 /100 WBC
PLATELET # BLD AUTO: 186 K/UL (ref 150–450)
PMV BLD AUTO: 11.5 FL (ref 9.2–12.9)
POTASSIUM SERPL-SCNC: 5.3 MMOL/L (ref 3.5–5.1)
PROT SERPL-MCNC: 7 GM/DL (ref 6–8.4)
PSA SERPL-MCNC: 0.16 NG/ML
RBC # BLD AUTO: 4.55 M/UL (ref 4.6–6.2)
RELATIVE EOSINOPHIL (OHS): 1.6 %
RELATIVE LYMPHOCYTE (OHS): 34.5 % (ref 18–48)
RELATIVE MONOCYTE (OHS): 10.1 % (ref 4–15)
RELATIVE NEUTROPHIL (OHS): 52.5 % (ref 38–73)
SODIUM SERPL-SCNC: 140 MMOL/L (ref 136–145)
TRIGL SERPL-MCNC: 147 MG/DL (ref 30–150)
WBC # BLD AUTO: 3.77 K/UL (ref 3.9–12.7)

## 2025-08-29 PROCEDURE — 83036 HEMOGLOBIN GLYCOSYLATED A1C: CPT

## 2025-08-29 PROCEDURE — 84153 ASSAY OF PSA TOTAL: CPT

## 2025-08-29 PROCEDURE — 80061 LIPID PANEL: CPT

## 2025-08-29 PROCEDURE — 84155 ASSAY OF PROTEIN SERUM: CPT

## 2025-08-29 PROCEDURE — 36415 COLL VENOUS BLD VENIPUNCTURE: CPT | Mod: PO

## 2025-08-29 PROCEDURE — 85025 COMPLETE CBC W/AUTO DIFF WBC: CPT

## 2025-08-30 ENCOUNTER — RESULTS FOLLOW-UP (OUTPATIENT)
Dept: INTERNAL MEDICINE | Facility: CLINIC | Age: 55
End: 2025-08-30
Payer: OTHER GOVERNMENT

## 2025-08-30 DIAGNOSIS — D64.9 ANEMIA, UNSPECIFIED TYPE: ICD-10-CM

## 2025-08-30 DIAGNOSIS — E87.5 HYPERKALEMIA: Primary | ICD-10-CM

## 2025-09-03 ENCOUNTER — OFFICE VISIT (OUTPATIENT)
Dept: INTERNAL MEDICINE | Facility: CLINIC | Age: 55
End: 2025-09-03
Payer: OTHER GOVERNMENT

## 2025-09-03 VITALS
OXYGEN SATURATION: 96 % | BODY MASS INDEX: 33.74 KG/M2 | TEMPERATURE: 97 F | HEART RATE: 79 BPM | SYSTOLIC BLOOD PRESSURE: 126 MMHG | DIASTOLIC BLOOD PRESSURE: 84 MMHG | WEIGHT: 249.13 LBS | HEIGHT: 72 IN

## 2025-09-03 DIAGNOSIS — E66.811 CLASS 1 OBESITY DUE TO EXCESS CALORIES WITH SERIOUS COMORBIDITY AND BODY MASS INDEX (BMI) OF 33.0 TO 33.9 IN ADULT: ICD-10-CM

## 2025-09-03 DIAGNOSIS — Z00.00 ROUTINE GENERAL MEDICAL EXAMINATION AT HEALTH CARE FACILITY: Primary | ICD-10-CM

## 2025-09-03 DIAGNOSIS — I10 PRIMARY HYPERTENSION: ICD-10-CM

## 2025-09-03 DIAGNOSIS — G47.33 OSA ON CPAP: ICD-10-CM

## 2025-09-03 DIAGNOSIS — E78.2 MIXED HYPERLIPIDEMIA: ICD-10-CM

## 2025-09-03 DIAGNOSIS — E66.09 CLASS 1 OBESITY DUE TO EXCESS CALORIES WITH SERIOUS COMORBIDITY AND BODY MASS INDEX (BMI) OF 33.0 TO 33.9 IN ADULT: ICD-10-CM

## 2025-09-03 PROCEDURE — 99396 PREV VISIT EST AGE 40-64: CPT | Mod: S$PBB,,, | Performed by: INTERNAL MEDICINE

## 2025-09-03 PROCEDURE — 99214 OFFICE O/P EST MOD 30 MIN: CPT | Mod: PBBFAC,PO | Performed by: INTERNAL MEDICINE

## 2025-09-03 PROCEDURE — 99999 PR PBB SHADOW E&M-EST. PATIENT-LVL IV: CPT | Mod: PBBFAC,,, | Performed by: INTERNAL MEDICINE

## 2025-09-03 RX ORDER — TIRZEPATIDE 5 MG/.5ML
5 INJECTION, SOLUTION SUBCUTANEOUS
Qty: 2 ML | Refills: 0 | Status: SHIPPED | OUTPATIENT
Start: 2025-10-02 | End: 2025-10-30

## 2025-09-03 RX ORDER — TIRZEPATIDE 7.5 MG/.5ML
7.5 INJECTION, SOLUTION SUBCUTANEOUS
Qty: 2 ML | Refills: 0 | Status: SHIPPED | OUTPATIENT
Start: 2025-10-31 | End: 2025-11-28

## 2025-09-03 RX ORDER — TIRZEPATIDE 2.5 MG/.5ML
2.5 INJECTION, SOLUTION SUBCUTANEOUS
Qty: 2 ML | Refills: 0 | Status: SHIPPED | OUTPATIENT
Start: 2025-09-03 | End: 2025-10-02